# Patient Record
Sex: FEMALE | Race: BLACK OR AFRICAN AMERICAN | Employment: OTHER
[De-identification: names, ages, dates, MRNs, and addresses within clinical notes are randomized per-mention and may not be internally consistent; named-entity substitution may affect disease eponyms.]

---

## 2017-01-19 ENCOUNTER — TELEPHONE (OUTPATIENT)
Dept: INTERNAL MEDICINE | Facility: CLINIC | Age: 67
End: 2017-01-19

## 2017-01-20 DIAGNOSIS — E11.69 DIABETES MELLITUS TYPE 2 IN OBESE (HCC): ICD-10-CM

## 2017-01-20 DIAGNOSIS — E66.9 DIABETES MELLITUS TYPE 2 IN OBESE (HCC): ICD-10-CM

## 2017-01-23 RX ORDER — AMITRIPTYLINE HYDROCHLORIDE 25 MG/1
25 TABLET, FILM COATED ORAL NIGHTLY
Qty: 30 TABLET | Refills: 3 | Status: SHIPPED | OUTPATIENT
Start: 2017-01-23 | End: 2017-03-21 | Stop reason: SDUPTHER

## 2017-01-23 RX ORDER — OXYBUTYNIN CHLORIDE 5 MG/1
TABLET ORAL
Qty: 30 TABLET | Refills: 5 | Status: SHIPPED | OUTPATIENT
Start: 2017-01-23 | End: 2017-07-10 | Stop reason: SDUPTHER

## 2017-01-25 ENCOUNTER — CARE COORDINATION (OUTPATIENT)
Dept: CARE COORDINATION | Facility: CLINIC | Age: 67
End: 2017-01-25

## 2017-02-08 ENCOUNTER — CARE COORDINATION (OUTPATIENT)
Dept: CARE COORDINATION | Facility: CLINIC | Age: 67
End: 2017-02-08

## 2017-02-10 ENCOUNTER — TELEPHONE (OUTPATIENT)
Dept: FAMILY MEDICINE CLINIC | Facility: CLINIC | Age: 67
End: 2017-02-10

## 2017-02-13 ENCOUNTER — CARE COORDINATION (OUTPATIENT)
Dept: CARE COORDINATION | Facility: CLINIC | Age: 67
End: 2017-02-13

## 2017-02-14 ENCOUNTER — TELEPHONE (OUTPATIENT)
Dept: FAMILY MEDICINE CLINIC | Facility: CLINIC | Age: 67
End: 2017-02-14

## 2017-02-15 ENCOUNTER — TELEPHONE (OUTPATIENT)
Dept: FAMILY MEDICINE CLINIC | Facility: CLINIC | Age: 67
End: 2017-02-15

## 2017-02-16 ENCOUNTER — TELEPHONE (OUTPATIENT)
Dept: FAMILY MEDICINE CLINIC | Facility: CLINIC | Age: 67
End: 2017-02-16

## 2017-02-23 ENCOUNTER — TELEPHONE (OUTPATIENT)
Dept: FAMILY MEDICINE CLINIC | Facility: CLINIC | Age: 67
End: 2017-02-23

## 2017-02-23 ENCOUNTER — CARE COORDINATION (OUTPATIENT)
Dept: CARE COORDINATION | Facility: CLINIC | Age: 67
End: 2017-02-23

## 2017-03-02 ENCOUNTER — APPOINTMENT (OUTPATIENT)
Dept: CT IMAGING | Age: 67
DRG: 287 | End: 2017-03-02
Payer: MEDICARE

## 2017-03-02 ENCOUNTER — APPOINTMENT (OUTPATIENT)
Dept: GENERAL RADIOLOGY | Age: 67
DRG: 287 | End: 2017-03-02
Payer: MEDICARE

## 2017-03-02 ENCOUNTER — HOSPITAL ENCOUNTER (INPATIENT)
Age: 67
LOS: 1 days | Discharge: HOME | DRG: 287 | End: 2017-03-07
Attending: EMERGENCY MEDICINE | Admitting: INTERNAL MEDICINE
Payer: MEDICARE

## 2017-03-02 DIAGNOSIS — D32.9 MENINGIOMA (HCC): ICD-10-CM

## 2017-03-02 DIAGNOSIS — R06.02 SHORTNESS OF BREATH: ICD-10-CM

## 2017-03-02 DIAGNOSIS — R07.9 CHEST PAIN, UNSPECIFIED TYPE: Primary | ICD-10-CM

## 2017-03-02 DIAGNOSIS — I44.0 FIRST DEGREE AV BLOCK: ICD-10-CM

## 2017-03-02 DIAGNOSIS — N17.9 AKI (ACUTE KIDNEY INJURY) (HCC): ICD-10-CM

## 2017-03-02 DIAGNOSIS — M54.5 LOW BACK PAIN, UNSPECIFIED BACK PAIN LATERALITY, UNSPECIFIED CHRONICITY, WITH SCIATICA PRESENCE UNSPECIFIED: ICD-10-CM

## 2017-03-02 DIAGNOSIS — R73.9 HYPERGLYCEMIA: ICD-10-CM

## 2017-03-02 LAB
ABSOLUTE EOS #: 0.1 K/UL (ref 0–0.4)
ABSOLUTE LYMPH #: 2.6 K/UL (ref 1–4.8)
ABSOLUTE MONO #: 0.7 K/UL (ref 0.1–1.2)
ANION GAP SERPL CALCULATED.3IONS-SCNC: 18 MMOL/L (ref 9–17)
BASOPHILS # BLD: 0 % (ref 0–2)
BASOPHILS ABSOLUTE: 0 K/UL (ref 0–0.2)
BNP INTERPRETATION: NORMAL
BUN BLDV-MCNC: 34 MG/DL (ref 8–23)
BUN/CREAT BLD: ABNORMAL (ref 9–20)
CALCIUM SERPL-MCNC: 9.4 MG/DL (ref 8.6–10.4)
CHLORIDE BLD-SCNC: 97 MMOL/L (ref 98–107)
CO2: 25 MMOL/L (ref 20–31)
CREAT SERPL-MCNC: 1.31 MG/DL (ref 0.5–0.9)
DIFFERENTIAL TYPE: ABNORMAL
EOSINOPHILS RELATIVE PERCENT: 1 % (ref 1–4)
GFR AFRICAN AMERICAN: 49 ML/MIN
GFR NON-AFRICAN AMERICAN: 41 ML/MIN
GFR SERPL CREATININE-BSD FRML MDRD: ABNORMAL ML/MIN/{1.73_M2}
GFR SERPL CREATININE-BSD FRML MDRD: ABNORMAL ML/MIN/{1.73_M2}
GLUCOSE BLD-MCNC: 112 MG/DL (ref 70–99)
GLUCOSE BLD-MCNC: 188 MG/DL (ref 65–105)
HCT VFR BLD CALC: 41.4 % (ref 36–46)
HEMOGLOBIN: 13.9 G/DL (ref 12–16)
LYMPHOCYTES # BLD: 27 % (ref 24–44)
MCH RBC QN AUTO: 29.5 PG (ref 26–34)
MCHC RBC AUTO-ENTMCNC: 33.5 G/DL (ref 31–37)
MCV RBC AUTO: 88.1 FL (ref 80–100)
MONOCYTES # BLD: 8 % (ref 2–11)
PDW BLD-RTO: 16 % (ref 12.5–15.4)
PLATELET # BLD: 195 K/UL (ref 140–450)
PLATELET ESTIMATE: ABNORMAL
PMV BLD AUTO: 8.8 FL (ref 6–12)
POC TROPONIN I: 0 NG/ML (ref 0–0.1)
POC TROPONIN I: 0 NG/ML (ref 0–0.1)
POC TROPONIN INTERP: NORMAL
POC TROPONIN INTERP: NORMAL
POTASSIUM SERPL-SCNC: 5.4 MMOL/L (ref 3.7–5.3)
PRO-BNP: 159 PG/ML
RBC # BLD: 4.7 M/UL (ref 4–5.2)
RBC # BLD: ABNORMAL 10*6/UL
SEG NEUTROPHILS: 64 % (ref 36–66)
SEGMENTED NEUTROPHILS ABSOLUTE COUNT: 6 K/UL (ref 1.8–7.7)
SODIUM BLD-SCNC: 140 MMOL/L (ref 135–144)
TROPONIN INTERP: NORMAL
TROPONIN INTERP: NORMAL
TROPONIN T: <0.03 NG/ML
TROPONIN T: <0.03 NG/ML
TSH SERPL DL<=0.05 MIU/L-ACNC: 0.89 MIU/L (ref 0.3–5)
WBC # BLD: 9.5 K/UL (ref 3.5–11)
WBC # BLD: ABNORMAL 10*3/UL

## 2017-03-02 PROCEDURE — 82947 ASSAY GLUCOSE BLOOD QUANT: CPT

## 2017-03-02 PROCEDURE — 84443 ASSAY THYROID STIM HORMONE: CPT

## 2017-03-02 PROCEDURE — 6370000000 HC RX 637 (ALT 250 FOR IP): Performed by: STUDENT IN AN ORGANIZED HEALTH CARE EDUCATION/TRAINING PROGRAM

## 2017-03-02 PROCEDURE — 6360000002 HC RX W HCPCS

## 2017-03-02 PROCEDURE — 1200000000 HC SEMI PRIVATE

## 2017-03-02 PROCEDURE — 36415 COLL VENOUS BLD VENIPUNCTURE: CPT

## 2017-03-02 PROCEDURE — 84484 ASSAY OF TROPONIN QUANT: CPT

## 2017-03-02 PROCEDURE — G0378 HOSPITAL OBSERVATION PER HR: HCPCS

## 2017-03-02 PROCEDURE — 83880 ASSAY OF NATRIURETIC PEPTIDE: CPT

## 2017-03-02 PROCEDURE — 84132 ASSAY OF SERUM POTASSIUM: CPT

## 2017-03-02 PROCEDURE — 93005 ELECTROCARDIOGRAM TRACING: CPT

## 2017-03-02 PROCEDURE — 2580000003 HC RX 258: Performed by: INTERNAL MEDICINE

## 2017-03-02 PROCEDURE — 71020 XR CHEST STANDARD TWO VW: CPT

## 2017-03-02 PROCEDURE — 6370000000 HC RX 637 (ALT 250 FOR IP): Performed by: EMERGENCY MEDICINE

## 2017-03-02 PROCEDURE — 2580000003 HC RX 258: Performed by: EMERGENCY MEDICINE

## 2017-03-02 PROCEDURE — 83036 HEMOGLOBIN GLYCOSYLATED A1C: CPT

## 2017-03-02 PROCEDURE — 80048 BASIC METABOLIC PNL TOTAL CA: CPT

## 2017-03-02 PROCEDURE — 99285 EMERGENCY DEPT VISIT HI MDM: CPT

## 2017-03-02 PROCEDURE — 82010 KETONE BODYS QUAN: CPT

## 2017-03-02 PROCEDURE — 85025 COMPLETE CBC W/AUTO DIFF WBC: CPT

## 2017-03-02 RX ORDER — NITROGLYCERIN 0.4 MG/1
0.4 TABLET SUBLINGUAL EVERY 5 MIN PRN
Status: DISCONTINUED | OUTPATIENT
Start: 2017-03-02 | End: 2017-03-07 | Stop reason: HOSPADM

## 2017-03-02 RX ORDER — DEXTROSE MONOHYDRATE 25 G/50ML
25 INJECTION, SOLUTION INTRAVENOUS ONCE
Status: COMPLETED | OUTPATIENT
Start: 2017-03-02 | End: 2017-03-03

## 2017-03-02 RX ORDER — ONDANSETRON 2 MG/ML
4 INJECTION INTRAMUSCULAR; INTRAVENOUS EVERY 6 HOURS PRN
Status: DISCONTINUED | OUTPATIENT
Start: 2017-03-02 | End: 2017-03-07 | Stop reason: HOSPADM

## 2017-03-02 RX ORDER — 0.9 % SODIUM CHLORIDE 0.9 %
250 INTRAVENOUS SOLUTION INTRAVENOUS ONCE
Status: COMPLETED | OUTPATIENT
Start: 2017-03-02 | End: 2017-03-02

## 2017-03-02 RX ORDER — ASPIRIN 81 MG/1
324 TABLET, CHEWABLE ORAL ONCE
Status: COMPLETED | OUTPATIENT
Start: 2017-03-02 | End: 2017-03-02

## 2017-03-02 RX ORDER — CARVEDILOL 6.25 MG/1
6.25 TABLET ORAL 2 TIMES DAILY WITH MEALS
Status: DISCONTINUED | OUTPATIENT
Start: 2017-03-03 | End: 2017-03-07 | Stop reason: HOSPADM

## 2017-03-02 RX ORDER — SODIUM CHLORIDE 9 MG/ML
INJECTION, SOLUTION INTRAVENOUS CONTINUOUS
Status: DISCONTINUED | OUTPATIENT
Start: 2017-03-02 | End: 2017-03-03

## 2017-03-02 RX ORDER — ATORVASTATIN CALCIUM 80 MG/1
80 TABLET, FILM COATED ORAL NIGHTLY
Status: DISCONTINUED | OUTPATIENT
Start: 2017-03-02 | End: 2017-03-07 | Stop reason: HOSPADM

## 2017-03-02 RX ORDER — SODIUM CHLORIDE 0.9 % (FLUSH) 0.9 %
10 SYRINGE (ML) INJECTION EVERY 12 HOURS SCHEDULED
Status: DISCONTINUED | OUTPATIENT
Start: 2017-03-02 | End: 2017-03-07 | Stop reason: HOSPADM

## 2017-03-02 RX ORDER — AMITRIPTYLINE HYDROCHLORIDE 25 MG/1
25 TABLET, FILM COATED ORAL NIGHTLY
Status: DISCONTINUED | OUTPATIENT
Start: 2017-03-02 | End: 2017-03-07 | Stop reason: HOSPADM

## 2017-03-02 RX ORDER — ASPIRIN 81 MG/1
81 TABLET ORAL DAILY
Status: DISCONTINUED | OUTPATIENT
Start: 2017-03-02 | End: 2017-03-07 | Stop reason: HOSPADM

## 2017-03-02 RX ORDER — BUTALBITAL, ACETAMINOPHEN AND CAFFEINE 50; 325; 40 MG/1; MG/1; MG/1
1 TABLET ORAL EVERY 4 HOURS PRN
Status: DISCONTINUED | OUTPATIENT
Start: 2017-03-02 | End: 2017-03-07 | Stop reason: HOSPADM

## 2017-03-02 RX ORDER — PANTOPRAZOLE SODIUM 40 MG/1
40 TABLET, DELAYED RELEASE ORAL
Status: DISCONTINUED | OUTPATIENT
Start: 2017-03-03 | End: 2017-03-03

## 2017-03-02 RX ORDER — DEXTROSE MONOHYDRATE 50 MG/ML
100 INJECTION, SOLUTION INTRAVENOUS PRN
Status: DISCONTINUED | OUTPATIENT
Start: 2017-03-02 | End: 2017-03-07 | Stop reason: HOSPADM

## 2017-03-02 RX ORDER — NICOTINE POLACRILEX 4 MG
15 LOZENGE BUCCAL PRN
Status: DISCONTINUED | OUTPATIENT
Start: 2017-03-02 | End: 2017-03-07 | Stop reason: HOSPADM

## 2017-03-02 RX ORDER — ISOSORBIDE MONONITRATE 30 MG/1
30 TABLET, EXTENDED RELEASE ORAL DAILY
COMMUNITY
End: 2017-03-16 | Stop reason: SDUPTHER

## 2017-03-02 RX ORDER — SODIUM CHLORIDE 0.9 % (FLUSH) 0.9 %
10 SYRINGE (ML) INJECTION PRN
Status: DISCONTINUED | OUTPATIENT
Start: 2017-03-02 | End: 2017-03-04

## 2017-03-02 RX ORDER — CARVEDILOL 12.5 MG/1
12.5 TABLET ORAL 2 TIMES DAILY WITH MEALS
Status: DISCONTINUED | OUTPATIENT
Start: 2017-03-02 | End: 2017-03-02

## 2017-03-02 RX ORDER — VERAPAMIL HYDROCHLORIDE 120 MG/1
120 CAPSULE, EXTENDED RELEASE ORAL NIGHTLY
Status: DISCONTINUED | OUTPATIENT
Start: 2017-03-02 | End: 2017-03-03

## 2017-03-02 RX ORDER — METOPROLOL SUCCINATE 25 MG/1
25 TABLET, EXTENDED RELEASE ORAL DAILY
Status: DISCONTINUED | OUTPATIENT
Start: 2017-03-02 | End: 2017-03-02

## 2017-03-02 RX ORDER — ACETAMINOPHEN 325 MG/1
650 TABLET ORAL EVERY 4 HOURS PRN
Status: DISCONTINUED | OUTPATIENT
Start: 2017-03-02 | End: 2017-03-07 | Stop reason: HOSPADM

## 2017-03-02 RX ORDER — METOPROLOL SUCCINATE 25 MG/1
25 TABLET, EXTENDED RELEASE ORAL DAILY
Status: ON HOLD | COMMUNITY
End: 2017-03-07 | Stop reason: HOSPADM

## 2017-03-02 RX ORDER — CLOPIDOGREL BISULFATE 75 MG/1
75 TABLET ORAL DAILY
Status: DISCONTINUED | OUTPATIENT
Start: 2017-03-02 | End: 2017-03-07 | Stop reason: HOSPADM

## 2017-03-02 RX ORDER — HEPARIN SODIUM 5000 [USP'U]/ML
5000 INJECTION, SOLUTION INTRAVENOUS; SUBCUTANEOUS EVERY 8 HOURS SCHEDULED
Status: DISCONTINUED | OUTPATIENT
Start: 2017-03-02 | End: 2017-03-03

## 2017-03-02 RX ORDER — ACETAMINOPHEN 325 MG/1
650 TABLET ORAL EVERY 4 HOURS PRN
Status: DISCONTINUED | OUTPATIENT
Start: 2017-03-02 | End: 2017-03-03

## 2017-03-02 RX ORDER — MORPHINE SULFATE 2 MG/ML
2 INJECTION, SOLUTION INTRAMUSCULAR; INTRAVENOUS EVERY 4 HOURS PRN
Status: DISCONTINUED | OUTPATIENT
Start: 2017-03-02 | End: 2017-03-07 | Stop reason: HOSPADM

## 2017-03-02 RX ORDER — ASPIRIN 81 MG/1
81 TABLET, CHEWABLE ORAL DAILY
Status: DISCONTINUED | OUTPATIENT
Start: 2017-03-03 | End: 2017-03-02

## 2017-03-02 RX ORDER — PREDNISONE 10 MG/1
20 TABLET ORAL DAILY
Status: ON HOLD | COMMUNITY
End: 2017-03-07 | Stop reason: HOSPADM

## 2017-03-02 RX ORDER — DEXTROSE MONOHYDRATE 25 G/50ML
12.5 INJECTION, SOLUTION INTRAVENOUS PRN
Status: DISCONTINUED | OUTPATIENT
Start: 2017-03-02 | End: 2017-03-07 | Stop reason: HOSPADM

## 2017-03-02 RX ADMIN — AMITRIPTYLINE HYDROCHLORIDE 25 MG: 25 TABLET, FILM COATED ORAL at 22:19

## 2017-03-02 RX ADMIN — ATORVASTATIN CALCIUM 80 MG: 80 TABLET, FILM COATED ORAL at 22:19

## 2017-03-02 RX ADMIN — CLOPIDOGREL 75 MG: 75 TABLET, FILM COATED ORAL at 22:19

## 2017-03-02 RX ADMIN — Medication 10 ML: at 22:23

## 2017-03-02 RX ADMIN — ASPIRIN 81 MG: 81 TABLET, COATED ORAL at 22:19

## 2017-03-02 RX ADMIN — VERAPAMIL HYDROCHLORIDE 120 MG: 120 CAPSULE, EXTENDED RELEASE ORAL at 22:19

## 2017-03-02 RX ADMIN — ASPIRIN 324 MG: 81 TABLET ORAL at 14:24

## 2017-03-02 RX ADMIN — SODIUM CHLORIDE 250 ML: 9 INJECTION, SOLUTION INTRAVENOUS at 16:15

## 2017-03-02 ASSESSMENT — PAIN DESCRIPTION - LOCATION
LOCATION: CHEST
LOCATION: CHEST

## 2017-03-02 ASSESSMENT — ENCOUNTER SYMPTOMS
APNEA: 0
SHORTNESS OF BREATH: 1
WHEEZING: 0
COUGH: 0
TROUBLE SWALLOWING: 0
DIARRHEA: 0
ABDOMINAL DISTENTION: 0
NAUSEA: 0
ABDOMINAL PAIN: 0
EYE PAIN: 0
BACK PAIN: 0
VOMITING: 0
CHOKING: 0
RHINORRHEA: 0
COLOR CHANGE: 0
PHOTOPHOBIA: 0
BLOOD IN STOOL: 0
VOICE CHANGE: 0
CHEST TIGHTNESS: 0
CONSTIPATION: 0
STRIDOR: 0
EYE REDNESS: 0
SORE THROAT: 0
EYE DISCHARGE: 0

## 2017-03-02 ASSESSMENT — PAIN SCALES - GENERAL
PAINLEVEL_OUTOF10: 5
PAINLEVEL_OUTOF10: 5

## 2017-03-02 ASSESSMENT — PAIN DESCRIPTION - PAIN TYPE
TYPE: ACUTE PAIN
TYPE: ACUTE PAIN

## 2017-03-03 ENCOUNTER — APPOINTMENT (OUTPATIENT)
Dept: NUCLEAR MEDICINE | Age: 67
DRG: 287 | End: 2017-03-03
Payer: MEDICARE

## 2017-03-03 PROBLEM — N17.9 AKI (ACUTE KIDNEY INJURY) (HCC): Status: ACTIVE | Noted: 2017-03-03

## 2017-03-03 PROBLEM — I50.32 CHRONIC DIASTOLIC CONGESTIVE HEART FAILURE (HCC): Status: ACTIVE | Noted: 2017-03-03

## 2017-03-03 LAB
ANION GAP SERPL CALCULATED.3IONS-SCNC: 11 MMOL/L (ref 9–17)
BETA-HYDROXYBUTYRATE: 0.16 MMOL/L (ref 0.02–0.27)
BUN BLDV-MCNC: 27 MG/DL (ref 8–23)
BUN/CREAT BLD: ABNORMAL (ref 9–20)
CALCIUM SERPL-MCNC: 8.5 MG/DL (ref 8.6–10.4)
CHLORIDE BLD-SCNC: 101 MMOL/L (ref 98–107)
CHOLESTEROL/HDL RATIO: 2.8
CHOLESTEROL: 122 MG/DL
CO2: 27 MMOL/L (ref 20–31)
CREAT SERPL-MCNC: 0.83 MG/DL (ref 0.5–0.9)
EKG ATRIAL RATE: 64 BPM
EKG ATRIAL RATE: 67 BPM
EKG P AXIS: 46 DEGREES
EKG P AXIS: 55 DEGREES
EKG P-R INTERVAL: 202 MS
EKG P-R INTERVAL: 280 MS
EKG Q-T INTERVAL: 402 MS
EKG Q-T INTERVAL: 446 MS
EKG QRS DURATION: 102 MS
EKG QRS DURATION: 96 MS
EKG QTC CALCULATION (BAZETT): 414 MS
EKG QTC CALCULATION (BAZETT): 471 MS
EKG R AXIS: -13 DEGREES
EKG R AXIS: 1 DEGREES
EKG T AXIS: 57 DEGREES
EKG T AXIS: 60 DEGREES
EKG VENTRICULAR RATE: 64 BPM
EKG VENTRICULAR RATE: 67 BPM
ESTIMATED AVERAGE GLUCOSE: 146 MG/DL
GFR AFRICAN AMERICAN: >60 ML/MIN
GFR NON-AFRICAN AMERICAN: >60 ML/MIN
GFR SERPL CREATININE-BSD FRML MDRD: ABNORMAL ML/MIN/{1.73_M2}
GFR SERPL CREATININE-BSD FRML MDRD: ABNORMAL ML/MIN/{1.73_M2}
GLUCOSE BLD-MCNC: 104 MG/DL (ref 65–105)
GLUCOSE BLD-MCNC: 116 MG/DL (ref 70–99)
GLUCOSE BLD-MCNC: 137 MG/DL (ref 65–105)
GLUCOSE BLD-MCNC: 138 MG/DL (ref 65–105)
GLUCOSE BLD-MCNC: 171 MG/DL (ref 65–105)
HBA1C MFR BLD: 6.7 % (ref 4–6)
HCT VFR BLD CALC: 37 % (ref 36–46)
HDLC SERPL-MCNC: 44 MG/DL
HEMOGLOBIN: 11.9 G/DL (ref 12–16)
LDL CHOLESTEROL: 55 MG/DL (ref 0–130)
LV EF: 55 %
LVEF MODALITY: NORMAL
MCH RBC QN AUTO: 29.2 PG (ref 26–34)
MCHC RBC AUTO-ENTMCNC: 32.2 G/DL (ref 31–37)
MCV RBC AUTO: 90.5 FL (ref 80–100)
PDW BLD-RTO: 16.8 % (ref 12.5–15.4)
PLATELET # BLD: 160 K/UL (ref 140–450)
PMV BLD AUTO: 9 FL (ref 6–12)
POTASSIUM SERPL-SCNC: 4.1 MMOL/L (ref 3.7–5.3)
POTASSIUM SERPL-SCNC: 4.7 MMOL/L (ref 3.7–5.3)
RBC # BLD: 4.08 M/UL (ref 4–5.2)
SODIUM BLD-SCNC: 139 MMOL/L (ref 135–144)
TRIGL SERPL-MCNC: 116 MG/DL
TROPONIN INTERP: NORMAL
TROPONIN INTERP: NORMAL
TROPONIN T: <0.03 NG/ML
TROPONIN T: <0.03 NG/ML
VLDLC SERPL CALC-MCNC: NORMAL MG/DL (ref 1–30)
WBC # BLD: 7.8 K/UL (ref 3.5–11)

## 2017-03-03 PROCEDURE — 82947 ASSAY GLUCOSE BLOOD QUANT: CPT

## 2017-03-03 PROCEDURE — 6360000002 HC RX W HCPCS: Performed by: STUDENT IN AN ORGANIZED HEALTH CARE EDUCATION/TRAINING PROGRAM

## 2017-03-03 PROCEDURE — 84484 ASSAY OF TROPONIN QUANT: CPT

## 2017-03-03 PROCEDURE — 80048 BASIC METABOLIC PNL TOTAL CA: CPT

## 2017-03-03 PROCEDURE — 93970 EXTREMITY STUDY: CPT

## 2017-03-03 PROCEDURE — 2580000003 HC RX 258: Performed by: STUDENT IN AN ORGANIZED HEALTH CARE EDUCATION/TRAINING PROGRAM

## 2017-03-03 PROCEDURE — 96372 THER/PROPH/DIAG INJ SC/IM: CPT

## 2017-03-03 PROCEDURE — 94762 N-INVAS EAR/PLS OXIMTRY CONT: CPT

## 2017-03-03 PROCEDURE — 85027 COMPLETE CBC AUTOMATED: CPT

## 2017-03-03 PROCEDURE — 36415 COLL VENOUS BLD VENIPUNCTURE: CPT

## 2017-03-03 PROCEDURE — G0378 HOSPITAL OBSERVATION PER HR: HCPCS

## 2017-03-03 PROCEDURE — 6360000002 HC RX W HCPCS: Performed by: INTERNAL MEDICINE

## 2017-03-03 PROCEDURE — 80061 LIPID PANEL: CPT

## 2017-03-03 PROCEDURE — 93306 TTE W/DOPPLER COMPLETE: CPT

## 2017-03-03 PROCEDURE — A9500 TC99M SESTAMIBI: HCPCS | Performed by: INTERNAL MEDICINE

## 2017-03-03 PROCEDURE — 78452 HT MUSCLE IMAGE SPECT MULT: CPT

## 2017-03-03 PROCEDURE — 99223 1ST HOSP IP/OBS HIGH 75: CPT | Performed by: INTERNAL MEDICINE

## 2017-03-03 PROCEDURE — 6370000000 HC RX 637 (ALT 250 FOR IP): Performed by: STUDENT IN AN ORGANIZED HEALTH CARE EDUCATION/TRAINING PROGRAM

## 2017-03-03 PROCEDURE — 93017 CV STRESS TEST TRACING ONLY: CPT | Performed by: NURSE PRACTITIONER

## 2017-03-03 PROCEDURE — 3430000000 HC RX DIAGNOSTIC RADIOPHARMACEUTICAL: Performed by: INTERNAL MEDICINE

## 2017-03-03 PROCEDURE — 96374 THER/PROPH/DIAG INJ IV PUSH: CPT

## 2017-03-03 PROCEDURE — 93005 ELECTROCARDIOGRAM TRACING: CPT

## 2017-03-03 RX ORDER — SODIUM CHLORIDE 9 MG/ML
INJECTION, SOLUTION INTRAVENOUS ONCE
Status: COMPLETED | OUTPATIENT
Start: 2017-03-03 | End: 2017-03-03

## 2017-03-03 RX ORDER — AMLODIPINE BESYLATE 2.5 MG/1
2.5 TABLET ORAL DAILY
Status: DISCONTINUED | OUTPATIENT
Start: 2017-03-03 | End: 2017-03-05

## 2017-03-03 RX ORDER — 0.9 % SODIUM CHLORIDE 0.9 %
500 INTRAVENOUS SOLUTION INTRAVENOUS ONCE
Status: COMPLETED | OUTPATIENT
Start: 2017-03-03 | End: 2017-03-03

## 2017-03-03 RX ORDER — SODIUM CHLORIDE 0.9 % (FLUSH) 0.9 %
10 SYRINGE (ML) INJECTION PRN
Status: DISCONTINUED | OUTPATIENT
Start: 2017-03-03 | End: 2017-03-03

## 2017-03-03 RX ORDER — AMINOPHYLLINE DIHYDRATE 25 MG/ML
100 INJECTION, SOLUTION INTRAVENOUS
Status: COMPLETED | OUTPATIENT
Start: 2017-03-03 | End: 2017-03-03

## 2017-03-03 RX ADMIN — ATORVASTATIN CALCIUM 80 MG: 80 TABLET, FILM COATED ORAL at 20:20

## 2017-03-03 RX ADMIN — REGADENOSON 0.4 MG: 0.08 INJECTION, SOLUTION INTRAVENOUS at 11:00

## 2017-03-03 RX ADMIN — Medication 10 ML: at 10:51

## 2017-03-03 RX ADMIN — Medication 10 ML: at 11:00

## 2017-03-03 RX ADMIN — AMITRIPTYLINE HYDROCHLORIDE 25 MG: 25 TABLET, FILM COATED ORAL at 20:20

## 2017-03-03 RX ADMIN — AMINOPHYLLINE 100 MG: 25 INJECTION, SOLUTION INTRAVENOUS at 11:02

## 2017-03-03 RX ADMIN — SODIUM CHLORIDE: 9 INJECTION, SOLUTION INTRAVENOUS at 10:51

## 2017-03-03 RX ADMIN — Medication 10 ML: at 20:20

## 2017-03-03 RX ADMIN — CARVEDILOL 6.25 MG: 6.25 TABLET, FILM COATED ORAL at 17:37

## 2017-03-03 RX ADMIN — HEPARIN SODIUM 5000 UNITS: 5000 INJECTION, SOLUTION INTRAVENOUS; SUBCUTANEOUS at 00:03

## 2017-03-03 RX ADMIN — TETRAKIS(2-METHOXYISOBUTYLISOCYANIDE)COPPER(I) TETRAFLUOROBORATE 39.6 MILLICURIE: 1 INJECTION, POWDER, LYOPHILIZED, FOR SOLUTION INTRAVENOUS at 11:00

## 2017-03-03 RX ADMIN — SODIUM CHLORIDE 500 ML: 9 INJECTION, SOLUTION INTRAVENOUS at 02:42

## 2017-03-03 RX ADMIN — SODIUM CHLORIDE: 9 INJECTION, SOLUTION INTRAVENOUS at 00:02

## 2017-03-03 RX ADMIN — INSULIN LISPRO 1 UNITS: 100 INJECTION, SOLUTION INTRAVENOUS; SUBCUTANEOUS at 00:03

## 2017-03-03 RX ADMIN — DEXTROSE MONOHYDRATE 25 G: 25 INJECTION, SOLUTION INTRAVENOUS at 00:03

## 2017-03-03 RX ADMIN — INSULIN HUMAN 10 UNITS: 100 INJECTION, SOLUTION PARENTERAL at 00:03

## 2017-03-03 RX ADMIN — Medication 10 ML: at 00:02

## 2017-03-03 ASSESSMENT — PAIN SCALES - GENERAL: PAINLEVEL_OUTOF10: 0

## 2017-03-04 LAB
ANION GAP SERPL CALCULATED.3IONS-SCNC: 16 MMOL/L (ref 9–17)
BUN BLDV-MCNC: 23 MG/DL (ref 8–23)
BUN/CREAT BLD: ABNORMAL (ref 9–20)
CALCIUM SERPL-MCNC: 8.8 MG/DL (ref 8.6–10.4)
CHLORIDE BLD-SCNC: 103 MMOL/L (ref 98–107)
CO2: 23 MMOL/L (ref 20–31)
CREAT SERPL-MCNC: 0.97 MG/DL (ref 0.5–0.9)
D-DIMER QUANTITATIVE: 0.43 MG/L FEU
GFR AFRICAN AMERICAN: >60 ML/MIN
GFR NON-AFRICAN AMERICAN: 57 ML/MIN
GFR SERPL CREATININE-BSD FRML MDRD: ABNORMAL ML/MIN/{1.73_M2}
GFR SERPL CREATININE-BSD FRML MDRD: ABNORMAL ML/MIN/{1.73_M2}
GLUCOSE BLD-MCNC: 122 MG/DL (ref 65–105)
GLUCOSE BLD-MCNC: 135 MG/DL (ref 65–105)
GLUCOSE BLD-MCNC: 147 MG/DL (ref 65–105)
GLUCOSE BLD-MCNC: 166 MG/DL (ref 70–99)
GLUCOSE BLD-MCNC: 173 MG/DL (ref 65–105)
POTASSIUM SERPL-SCNC: 4.4 MMOL/L (ref 3.7–5.3)
SODIUM BLD-SCNC: 142 MMOL/L (ref 135–144)

## 2017-03-04 PROCEDURE — 3430000000 HC RX DIAGNOSTIC RADIOPHARMACEUTICAL: Performed by: INTERNAL MEDICINE

## 2017-03-04 PROCEDURE — 6370000000 HC RX 637 (ALT 250 FOR IP): Performed by: HOSPITALIST

## 2017-03-04 PROCEDURE — 99232 SBSQ HOSP IP/OBS MODERATE 35: CPT | Performed by: INTERNAL MEDICINE

## 2017-03-04 PROCEDURE — 6370000000 HC RX 637 (ALT 250 FOR IP): Performed by: STUDENT IN AN ORGANIZED HEALTH CARE EDUCATION/TRAINING PROGRAM

## 2017-03-04 PROCEDURE — G0378 HOSPITAL OBSERVATION PER HR: HCPCS

## 2017-03-04 PROCEDURE — 36415 COLL VENOUS BLD VENIPUNCTURE: CPT

## 2017-03-04 PROCEDURE — 94762 N-INVAS EAR/PLS OXIMTRY CONT: CPT

## 2017-03-04 PROCEDURE — 96372 THER/PROPH/DIAG INJ SC/IM: CPT

## 2017-03-04 PROCEDURE — 85379 FIBRIN DEGRADATION QUANT: CPT

## 2017-03-04 PROCEDURE — 2580000003 HC RX 258: Performed by: STUDENT IN AN ORGANIZED HEALTH CARE EDUCATION/TRAINING PROGRAM

## 2017-03-04 PROCEDURE — 82947 ASSAY GLUCOSE BLOOD QUANT: CPT

## 2017-03-04 PROCEDURE — 80048 BASIC METABOLIC PNL TOTAL CA: CPT

## 2017-03-04 PROCEDURE — 6360000002 HC RX W HCPCS: Performed by: STUDENT IN AN ORGANIZED HEALTH CARE EDUCATION/TRAINING PROGRAM

## 2017-03-04 PROCEDURE — A9500 TC99M SESTAMIBI: HCPCS | Performed by: INTERNAL MEDICINE

## 2017-03-04 RX ORDER — SODIUM CHLORIDE 0.9 % (FLUSH) 0.9 %
10 SYRINGE (ML) INJECTION PRN
Status: DISCONTINUED | OUTPATIENT
Start: 2017-03-04 | End: 2017-03-04

## 2017-03-04 RX ORDER — ISOSORBIDE MONONITRATE 30 MG/1
30 TABLET, EXTENDED RELEASE ORAL DAILY
Status: DISCONTINUED | OUTPATIENT
Start: 2017-03-04 | End: 2017-03-07 | Stop reason: HOSPADM

## 2017-03-04 RX ORDER — SODIUM CHLORIDE 0.9 % (FLUSH) 0.9 %
10 SYRINGE (ML) INJECTION PRN
Status: DISCONTINUED | OUTPATIENT
Start: 2017-03-04 | End: 2017-03-07 | Stop reason: HOSPADM

## 2017-03-04 RX ORDER — LOSARTAN POTASSIUM 50 MG/1
50 TABLET ORAL DAILY
Status: DISCONTINUED | OUTPATIENT
Start: 2017-03-04 | End: 2017-03-06

## 2017-03-04 RX ORDER — FUROSEMIDE 40 MG/1
40 TABLET ORAL 2 TIMES DAILY
Status: DISCONTINUED | OUTPATIENT
Start: 2017-03-04 | End: 2017-03-06

## 2017-03-04 RX ADMIN — ENOXAPARIN SODIUM 30 MG: 40 INJECTION SUBCUTANEOUS at 09:51

## 2017-03-04 RX ADMIN — ENOXAPARIN SODIUM 30 MG: 40 INJECTION SUBCUTANEOUS at 20:45

## 2017-03-04 RX ADMIN — CLOPIDOGREL 75 MG: 75 TABLET, FILM COATED ORAL at 09:32

## 2017-03-04 RX ADMIN — ATORVASTATIN CALCIUM 80 MG: 80 TABLET, FILM COATED ORAL at 20:46

## 2017-03-04 RX ADMIN — ASPIRIN 81 MG: 81 TABLET, COATED ORAL at 09:33

## 2017-03-04 RX ADMIN — AMITRIPTYLINE HYDROCHLORIDE 25 MG: 25 TABLET, FILM COATED ORAL at 21:24

## 2017-03-04 RX ADMIN — CARVEDILOL 6.25 MG: 6.25 TABLET, FILM COATED ORAL at 09:32

## 2017-03-04 RX ADMIN — ISOSORBIDE MONONITRATE 30 MG: 30 TABLET ORAL at 09:32

## 2017-03-04 RX ADMIN — INSULIN LISPRO 2 UNITS: 100 INJECTION, SOLUTION INTRAVENOUS; SUBCUTANEOUS at 09:33

## 2017-03-04 RX ADMIN — MAGNESIUM HYDROXIDE 30 ML: 400 SUSPENSION ORAL at 04:04

## 2017-03-04 RX ADMIN — FUROSEMIDE 40 MG: 40 TABLET ORAL at 17:25

## 2017-03-04 RX ADMIN — Medication 10 ML: at 20:45

## 2017-03-04 RX ADMIN — SODIUM CHLORIDE, PRESERVATIVE FREE 10 ML: 5 INJECTION INTRAVENOUS at 07:30

## 2017-03-04 RX ADMIN — FUROSEMIDE 40 MG: 40 TABLET ORAL at 09:32

## 2017-03-04 RX ADMIN — TETRAKIS(2-METHOXYISOBUTYLISOCYANIDE)COPPER(I) TETRAFLUOROBORATE 41 MILLICURIE: 1 INJECTION, POWDER, LYOPHILIZED, FOR SOLUTION INTRAVENOUS at 07:30

## 2017-03-04 RX ADMIN — LOSARTAN POTASSIUM 50 MG: 50 TABLET, FILM COATED ORAL at 09:32

## 2017-03-04 RX ADMIN — BUTALBITAL, ACETAMINOPHEN, AND CAFFEINE 1 TABLET: 50; 325; 40 TABLET ORAL at 14:59

## 2017-03-04 RX ADMIN — CARVEDILOL 6.25 MG: 6.25 TABLET, FILM COATED ORAL at 17:25

## 2017-03-04 RX ADMIN — AMLODIPINE BESYLATE 2.5 MG: 2.5 TABLET ORAL at 09:33

## 2017-03-04 ASSESSMENT — PAIN SCALES - GENERAL
PAINLEVEL_OUTOF10: 7
PAINLEVEL_OUTOF10: 5

## 2017-03-05 ENCOUNTER — CARE COORDINATION (OUTPATIENT)
Dept: CASE MANAGEMENT | Age: 67
End: 2017-03-05

## 2017-03-05 LAB
ANION GAP SERPL CALCULATED.3IONS-SCNC: 11 MMOL/L (ref 9–17)
BUN BLDV-MCNC: 23 MG/DL (ref 8–23)
BUN/CREAT BLD: ABNORMAL (ref 9–20)
CALCIUM SERPL-MCNC: 8.5 MG/DL (ref 8.6–10.4)
CHLORIDE BLD-SCNC: 103 MMOL/L (ref 98–107)
CO2: 25 MMOL/L (ref 20–31)
CREAT SERPL-MCNC: 0.78 MG/DL (ref 0.5–0.9)
GFR AFRICAN AMERICAN: >60 ML/MIN
GFR NON-AFRICAN AMERICAN: >60 ML/MIN
GFR SERPL CREATININE-BSD FRML MDRD: ABNORMAL ML/MIN/{1.73_M2}
GFR SERPL CREATININE-BSD FRML MDRD: ABNORMAL ML/MIN/{1.73_M2}
GLUCOSE BLD-MCNC: 115 MG/DL (ref 65–105)
GLUCOSE BLD-MCNC: 116 MG/DL (ref 65–105)
GLUCOSE BLD-MCNC: 135 MG/DL (ref 65–105)
GLUCOSE BLD-MCNC: 145 MG/DL (ref 70–99)
GLUCOSE BLD-MCNC: 166 MG/DL (ref 65–105)
HCT VFR BLD CALC: 41.1 % (ref 36–46)
HEMOGLOBIN: 13.5 G/DL (ref 12–16)
MCH RBC QN AUTO: 29.4 PG (ref 26–34)
MCHC RBC AUTO-ENTMCNC: 32.7 G/DL (ref 31–37)
MCV RBC AUTO: 89.8 FL (ref 80–100)
PDW BLD-RTO: 16.5 % (ref 12.5–15.4)
PLATELET # BLD: 161 K/UL (ref 140–450)
PMV BLD AUTO: 8.3 FL (ref 6–12)
POTASSIUM SERPL-SCNC: 4.4 MMOL/L (ref 3.7–5.3)
RBC # BLD: 4.58 M/UL (ref 4–5.2)
SODIUM BLD-SCNC: 139 MMOL/L (ref 135–144)
WBC # BLD: 6.8 K/UL (ref 3.5–11)

## 2017-03-05 PROCEDURE — 36415 COLL VENOUS BLD VENIPUNCTURE: CPT

## 2017-03-05 PROCEDURE — 6370000000 HC RX 637 (ALT 250 FOR IP): Performed by: STUDENT IN AN ORGANIZED HEALTH CARE EDUCATION/TRAINING PROGRAM

## 2017-03-05 PROCEDURE — 6370000000 HC RX 637 (ALT 250 FOR IP): Performed by: HOSPITALIST

## 2017-03-05 PROCEDURE — 2580000003 HC RX 258: Performed by: STUDENT IN AN ORGANIZED HEALTH CARE EDUCATION/TRAINING PROGRAM

## 2017-03-05 PROCEDURE — 6360000002 HC RX W HCPCS: Performed by: STUDENT IN AN ORGANIZED HEALTH CARE EDUCATION/TRAINING PROGRAM

## 2017-03-05 PROCEDURE — 80048 BASIC METABOLIC PNL TOTAL CA: CPT

## 2017-03-05 PROCEDURE — 99232 SBSQ HOSP IP/OBS MODERATE 35: CPT | Performed by: INTERNAL MEDICINE

## 2017-03-05 PROCEDURE — 96372 THER/PROPH/DIAG INJ SC/IM: CPT

## 2017-03-05 PROCEDURE — 94762 N-INVAS EAR/PLS OXIMTRY CONT: CPT

## 2017-03-05 PROCEDURE — 82947 ASSAY GLUCOSE BLOOD QUANT: CPT

## 2017-03-05 PROCEDURE — 85027 COMPLETE CBC AUTOMATED: CPT

## 2017-03-05 PROCEDURE — G0378 HOSPITAL OBSERVATION PER HR: HCPCS

## 2017-03-05 RX ORDER — AMLODIPINE BESYLATE 2.5 MG/1
2.5 TABLET ORAL NIGHTLY
Status: DISCONTINUED | OUTPATIENT
Start: 2017-03-06 | End: 2017-03-06

## 2017-03-05 RX ORDER — OXYBUTYNIN CHLORIDE 5 MG/1
5 TABLET ORAL DAILY
Status: DISCONTINUED | OUTPATIENT
Start: 2017-03-05 | End: 2017-03-07 | Stop reason: HOSPADM

## 2017-03-05 RX ADMIN — CARVEDILOL 6.25 MG: 6.25 TABLET, FILM COATED ORAL at 09:37

## 2017-03-05 RX ADMIN — Medication 10 ML: at 21:13

## 2017-03-05 RX ADMIN — ENOXAPARIN SODIUM 30 MG: 40 INJECTION SUBCUTANEOUS at 09:36

## 2017-03-05 RX ADMIN — ISOSORBIDE MONONITRATE 30 MG: 30 TABLET ORAL at 09:36

## 2017-03-05 RX ADMIN — ENOXAPARIN SODIUM 30 MG: 40 INJECTION SUBCUTANEOUS at 21:13

## 2017-03-05 RX ADMIN — BUTALBITAL, ACETAMINOPHEN, AND CAFFEINE 1 TABLET: 50; 325; 40 TABLET ORAL at 09:43

## 2017-03-05 RX ADMIN — AMLODIPINE BESYLATE 2.5 MG: 2.5 TABLET ORAL at 09:37

## 2017-03-05 RX ADMIN — MAGNESIUM HYDROXIDE 30 ML: 400 SUSPENSION ORAL at 16:13

## 2017-03-05 RX ADMIN — LOSARTAN POTASSIUM 50 MG: 50 TABLET, FILM COATED ORAL at 09:35

## 2017-03-05 RX ADMIN — ATORVASTATIN CALCIUM 80 MG: 80 TABLET, FILM COATED ORAL at 21:13

## 2017-03-05 RX ADMIN — BUTALBITAL, ACETAMINOPHEN, AND CAFFEINE 1 TABLET: 50; 325; 40 TABLET ORAL at 00:51

## 2017-03-05 RX ADMIN — Medication 10 ML: at 09:37

## 2017-03-05 RX ADMIN — ASPIRIN 81 MG: 81 TABLET, COATED ORAL at 09:37

## 2017-03-05 RX ADMIN — CLOPIDOGREL 75 MG: 75 TABLET, FILM COATED ORAL at 09:37

## 2017-03-05 RX ADMIN — CARVEDILOL 6.25 MG: 6.25 TABLET, FILM COATED ORAL at 16:13

## 2017-03-05 RX ADMIN — INSULIN LISPRO 1 UNITS: 100 INJECTION, SOLUTION INTRAVENOUS; SUBCUTANEOUS at 21:14

## 2017-03-05 RX ADMIN — AMITRIPTYLINE HYDROCHLORIDE 25 MG: 25 TABLET, FILM COATED ORAL at 21:13

## 2017-03-05 RX ADMIN — OXYBUTYNIN CHLORIDE 5 MG: 5 TABLET ORAL at 09:35

## 2017-03-05 ASSESSMENT — PAIN SCALES - GENERAL
PAINLEVEL_OUTOF10: 4
PAINLEVEL_OUTOF10: 6
PAINLEVEL_OUTOF10: 0

## 2017-03-05 ASSESSMENT — PAIN DESCRIPTION - FREQUENCY: FREQUENCY: CONTINUOUS

## 2017-03-05 ASSESSMENT — PAIN DESCRIPTION - DESCRIPTORS: DESCRIPTORS: THROBBING

## 2017-03-05 ASSESSMENT — PAIN DESCRIPTION - LOCATION: LOCATION: HEAD

## 2017-03-05 ASSESSMENT — PAIN DESCRIPTION - ORIENTATION: ORIENTATION: ANTERIOR

## 2017-03-06 ENCOUNTER — APPOINTMENT (OUTPATIENT)
Dept: CARDIAC CATH/INVASIVE PROCEDURES | Age: 67
DRG: 287 | End: 2017-03-06
Payer: MEDICARE

## 2017-03-06 LAB
ANION GAP SERPL CALCULATED.3IONS-SCNC: 16 MMOL/L (ref 9–17)
BUN BLDV-MCNC: 17 MG/DL (ref 8–23)
BUN/CREAT BLD: ABNORMAL (ref 9–20)
CALCIUM SERPL-MCNC: 8.8 MG/DL (ref 8.6–10.4)
CHLORIDE BLD-SCNC: 102 MMOL/L (ref 98–107)
CO2: 21 MMOL/L (ref 20–31)
CREAT SERPL-MCNC: 0.72 MG/DL (ref 0.5–0.9)
GFR AFRICAN AMERICAN: >60 ML/MIN
GFR NON-AFRICAN AMERICAN: >60 ML/MIN
GFR SERPL CREATININE-BSD FRML MDRD: ABNORMAL ML/MIN/{1.73_M2}
GFR SERPL CREATININE-BSD FRML MDRD: ABNORMAL ML/MIN/{1.73_M2}
GLUCOSE BLD-MCNC: 110 MG/DL (ref 65–105)
GLUCOSE BLD-MCNC: 112 MG/DL (ref 65–105)
GLUCOSE BLD-MCNC: 141 MG/DL (ref 70–99)
GLUCOSE BLD-MCNC: 151 MG/DL (ref 65–105)
HCT VFR BLD CALC: 37.1 % (ref 36–46)
HEMOGLOBIN: 12.2 G/DL (ref 12–16)
MCH RBC QN AUTO: 29.3 PG (ref 26–34)
MCHC RBC AUTO-ENTMCNC: 33 G/DL (ref 31–37)
MCV RBC AUTO: 89 FL (ref 80–100)
PDW BLD-RTO: 15.8 % (ref 12.5–15.4)
PLATELET # BLD: 145 K/UL (ref 140–450)
PMV BLD AUTO: 8.6 FL (ref 6–12)
POTASSIUM SERPL-SCNC: 4.4 MMOL/L (ref 3.7–5.3)
RBC # BLD: 4.17 M/UL (ref 4–5.2)
SODIUM BLD-SCNC: 139 MMOL/L (ref 135–144)
WBC # BLD: 7.2 K/UL (ref 3.5–11)

## 2017-03-06 PROCEDURE — 6370000000 HC RX 637 (ALT 250 FOR IP): Performed by: STUDENT IN AN ORGANIZED HEALTH CARE EDUCATION/TRAINING PROGRAM

## 2017-03-06 PROCEDURE — C1894 INTRO/SHEATH, NON-LASER: HCPCS

## 2017-03-06 PROCEDURE — 6360000002 HC RX W HCPCS: Performed by: STUDENT IN AN ORGANIZED HEALTH CARE EDUCATION/TRAINING PROGRAM

## 2017-03-06 PROCEDURE — 36415 COLL VENOUS BLD VENIPUNCTURE: CPT

## 2017-03-06 PROCEDURE — C1769 GUIDE WIRE: HCPCS

## 2017-03-06 PROCEDURE — 2580000003 HC RX 258: Performed by: HOSPITALIST

## 2017-03-06 PROCEDURE — 1200000000 HC SEMI PRIVATE

## 2017-03-06 PROCEDURE — C1725 CATH, TRANSLUMIN NON-LASER: HCPCS

## 2017-03-06 PROCEDURE — 82947 ASSAY GLUCOSE BLOOD QUANT: CPT | Performed by: INTERNAL MEDICINE

## 2017-03-06 PROCEDURE — 93005 ELECTROCARDIOGRAM TRACING: CPT

## 2017-03-06 PROCEDURE — 6360000002 HC RX W HCPCS: Performed by: INTERNAL MEDICINE

## 2017-03-06 PROCEDURE — B2151ZZ FLUOROSCOPY OF LEFT HEART USING LOW OSMOLAR CONTRAST: ICD-10-PCS | Performed by: INTERNAL MEDICINE

## 2017-03-06 PROCEDURE — B2111ZZ FLUOROSCOPY OF MULTIPLE CORONARY ARTERIES USING LOW OSMOLAR CONTRAST: ICD-10-PCS | Performed by: INTERNAL MEDICINE

## 2017-03-06 PROCEDURE — 6370000000 HC RX 637 (ALT 250 FOR IP): Performed by: HOSPITALIST

## 2017-03-06 PROCEDURE — 93458 L HRT ARTERY/VENTRICLE ANGIO: CPT | Performed by: INTERNAL MEDICINE

## 2017-03-06 PROCEDURE — 4A023N7 MEASUREMENT OF CARDIAC SAMPLING AND PRESSURE, LEFT HEART, PERCUTANEOUS APPROACH: ICD-10-PCS | Performed by: INTERNAL MEDICINE

## 2017-03-06 PROCEDURE — 76937 US GUIDE VASCULAR ACCESS: CPT

## 2017-03-06 PROCEDURE — 94762 N-INVAS EAR/PLS OXIMTRY CONT: CPT

## 2017-03-06 PROCEDURE — 2500000003 HC RX 250 WO HCPCS

## 2017-03-06 PROCEDURE — 80048 BASIC METABOLIC PNL TOTAL CA: CPT

## 2017-03-06 PROCEDURE — 2709999900 HC NON-CHARGEABLE SUPPLY

## 2017-03-06 PROCEDURE — 85027 COMPLETE CBC AUTOMATED: CPT

## 2017-03-06 RX ORDER — ACETAMINOPHEN 325 MG/1
650 TABLET ORAL EVERY 4 HOURS PRN
Status: DISCONTINUED | OUTPATIENT
Start: 2017-03-06 | End: 2017-03-07 | Stop reason: HOSPADM

## 2017-03-06 RX ORDER — AMLODIPINE BESYLATE 5 MG/1
5 TABLET ORAL NIGHTLY
Status: DISCONTINUED | OUTPATIENT
Start: 2017-03-06 | End: 2017-03-07 | Stop reason: HOSPADM

## 2017-03-06 RX ORDER — SODIUM CHLORIDE 9 MG/ML
INJECTION, SOLUTION INTRAVENOUS CONTINUOUS
Status: DISCONTINUED | OUTPATIENT
Start: 2017-03-06 | End: 2017-03-07

## 2017-03-06 RX ADMIN — INSULIN LISPRO 1 UNITS: 100 INJECTION, SOLUTION INTRAVENOUS; SUBCUTANEOUS at 21:05

## 2017-03-06 RX ADMIN — SODIUM CHLORIDE: 9 INJECTION, SOLUTION INTRAVENOUS at 14:00

## 2017-03-06 RX ADMIN — OXYBUTYNIN CHLORIDE 5 MG: 5 TABLET ORAL at 09:22

## 2017-03-06 RX ADMIN — CARVEDILOL 6.25 MG: 6.25 TABLET, FILM COATED ORAL at 09:22

## 2017-03-06 RX ADMIN — ACETAMINOPHEN 650 MG: 325 TABLET ORAL at 23:00

## 2017-03-06 RX ADMIN — ATORVASTATIN CALCIUM 80 MG: 80 TABLET, FILM COATED ORAL at 23:00

## 2017-03-06 RX ADMIN — AMITRIPTYLINE HYDROCHLORIDE 25 MG: 25 TABLET, FILM COATED ORAL at 23:00

## 2017-03-06 RX ADMIN — AMLODIPINE BESYLATE 5 MG: 5 TABLET ORAL at 21:03

## 2017-03-06 RX ADMIN — BUTALBITAL, ACETAMINOPHEN, AND CAFFEINE 1 TABLET: 50; 325; 40 TABLET ORAL at 14:58

## 2017-03-06 RX ADMIN — ENOXAPARIN SODIUM 30 MG: 40 INJECTION SUBCUTANEOUS at 23:00

## 2017-03-06 RX ADMIN — ISOSORBIDE MONONITRATE 30 MG: 30 TABLET ORAL at 09:22

## 2017-03-06 RX ADMIN — MORPHINE SULFATE 2 MG: 2 INJECTION, SOLUTION INTRAMUSCULAR; INTRAVENOUS at 21:00

## 2017-03-06 RX ADMIN — LOSARTAN POTASSIUM 50 MG: 50 TABLET, FILM COATED ORAL at 09:22

## 2017-03-06 RX ADMIN — CARVEDILOL 6.25 MG: 6.25 TABLET, FILM COATED ORAL at 17:00

## 2017-03-06 ASSESSMENT — PAIN SCALES - GENERAL
PAINLEVEL_OUTOF10: 7
PAINLEVEL_OUTOF10: 7
PAINLEVEL_OUTOF10: 4
PAINLEVEL_OUTOF10: 7
PAINLEVEL_OUTOF10: 4

## 2017-03-07 VITALS
HEART RATE: 66 BPM | TEMPERATURE: 97.5 F | DIASTOLIC BLOOD PRESSURE: 73 MMHG | OXYGEN SATURATION: 99 % | SYSTOLIC BLOOD PRESSURE: 151 MMHG | HEIGHT: 65 IN | RESPIRATION RATE: 20 BRPM | BODY MASS INDEX: 48.53 KG/M2 | WEIGHT: 291.25 LBS

## 2017-03-07 LAB
ANION GAP SERPL CALCULATED.3IONS-SCNC: 12 MMOL/L (ref 9–17)
BUN BLDV-MCNC: 16 MG/DL (ref 8–23)
BUN/CREAT BLD: ABNORMAL (ref 9–20)
CALCIUM SERPL-MCNC: 8.7 MG/DL (ref 8.6–10.4)
CHLORIDE BLD-SCNC: 105 MMOL/L (ref 98–107)
CO2: 24 MMOL/L (ref 20–31)
CREAT SERPL-MCNC: 0.75 MG/DL (ref 0.5–0.9)
EKG ATRIAL RATE: 83 BPM
EKG P AXIS: 38 DEGREES
EKG P-R INTERVAL: 158 MS
EKG Q-T INTERVAL: 390 MS
EKG QRS DURATION: 100 MS
EKG QTC CALCULATION (BAZETT): 458 MS
EKG R AXIS: -27 DEGREES
EKG T AXIS: 42 DEGREES
EKG VENTRICULAR RATE: 83 BPM
GFR AFRICAN AMERICAN: >60 ML/MIN
GFR NON-AFRICAN AMERICAN: >60 ML/MIN
GFR SERPL CREATININE-BSD FRML MDRD: ABNORMAL ML/MIN/{1.73_M2}
GFR SERPL CREATININE-BSD FRML MDRD: ABNORMAL ML/MIN/{1.73_M2}
GLUCOSE BLD-MCNC: 120 MG/DL (ref 65–105)
GLUCOSE BLD-MCNC: 126 MG/DL (ref 65–105)
GLUCOSE BLD-MCNC: 128 MG/DL (ref 70–99)
HCT VFR BLD CALC: 40.1 % (ref 36–46)
HEMOGLOBIN: 12.9 G/DL (ref 12–16)
POTASSIUM SERPL-SCNC: 4.4 MMOL/L (ref 3.7–5.3)
SODIUM BLD-SCNC: 141 MMOL/L (ref 135–144)

## 2017-03-07 PROCEDURE — 82947 ASSAY GLUCOSE BLOOD QUANT: CPT

## 2017-03-07 PROCEDURE — 6360000002 HC RX W HCPCS: Performed by: STUDENT IN AN ORGANIZED HEALTH CARE EDUCATION/TRAINING PROGRAM

## 2017-03-07 PROCEDURE — 85014 HEMATOCRIT: CPT

## 2017-03-07 PROCEDURE — 36415 COLL VENOUS BLD VENIPUNCTURE: CPT

## 2017-03-07 PROCEDURE — 94762 N-INVAS EAR/PLS OXIMTRY CONT: CPT

## 2017-03-07 PROCEDURE — 80048 BASIC METABOLIC PNL TOTAL CA: CPT

## 2017-03-07 PROCEDURE — 6370000000 HC RX 637 (ALT 250 FOR IP): Performed by: STUDENT IN AN ORGANIZED HEALTH CARE EDUCATION/TRAINING PROGRAM

## 2017-03-07 PROCEDURE — 85018 HEMOGLOBIN: CPT

## 2017-03-07 RX ORDER — BUTALBITAL, ACETAMINOPHEN AND CAFFEINE 50; 325; 40 MG/1; MG/1; MG/1
1 TABLET ORAL EVERY 4 HOURS PRN
Qty: 30 TABLET | Refills: 3 | Status: SHIPPED | OUTPATIENT
Start: 2017-03-07 | End: 2017-03-16 | Stop reason: SDUPTHER

## 2017-03-07 RX ADMIN — CARVEDILOL 6.25 MG: 6.25 TABLET, FILM COATED ORAL at 09:24

## 2017-03-07 RX ADMIN — ENOXAPARIN SODIUM 30 MG: 40 INJECTION SUBCUTANEOUS at 09:24

## 2017-03-07 RX ADMIN — CLOPIDOGREL 75 MG: 75 TABLET, FILM COATED ORAL at 09:24

## 2017-03-07 RX ADMIN — ASPIRIN 81 MG: 81 TABLET, COATED ORAL at 09:24

## 2017-03-07 RX ADMIN — ISOSORBIDE MONONITRATE 30 MG: 30 TABLET ORAL at 09:24

## 2017-03-07 RX ADMIN — OXYBUTYNIN CHLORIDE 5 MG: 5 TABLET ORAL at 09:24

## 2017-03-08 ENCOUNTER — CARE COORDINATION (OUTPATIENT)
Dept: CASE MANAGEMENT | Age: 67
End: 2017-03-08

## 2017-03-08 ENCOUNTER — TELEPHONE (OUTPATIENT)
Dept: PHARMACY | Age: 67
End: 2017-03-08

## 2017-03-08 ENCOUNTER — TELEPHONE (OUTPATIENT)
Dept: INTERNAL MEDICINE | Facility: CLINIC | Age: 67
End: 2017-03-08

## 2017-03-10 ENCOUNTER — CARE COORDINATION (OUTPATIENT)
Dept: CASE MANAGEMENT | Age: 67
End: 2017-03-10

## 2017-03-10 RX ORDER — AMLODIPINE BESYLATE 5 MG/1
5 TABLET ORAL NIGHTLY
COMMUNITY
End: 2017-03-16 | Stop reason: SDUPTHER

## 2017-03-10 RX ORDER — CARVEDILOL 25 MG/1
25 TABLET ORAL DAILY
Status: ON HOLD | COMMUNITY
End: 2017-09-09 | Stop reason: HOSPADM

## 2017-03-10 RX ORDER — ACETAMINOPHEN 500 MG
1000 TABLET ORAL NIGHTLY PRN
COMMUNITY
End: 2017-09-26 | Stop reason: ALTCHOICE

## 2017-03-13 ENCOUNTER — CARE COORDINATION (OUTPATIENT)
Dept: CASE MANAGEMENT | Age: 67
End: 2017-03-13

## 2017-03-16 ENCOUNTER — OFFICE VISIT (OUTPATIENT)
Dept: INTERNAL MEDICINE | Age: 67
End: 2017-03-16
Payer: MEDICARE

## 2017-03-16 VITALS
DIASTOLIC BLOOD PRESSURE: 73 MMHG | WEIGHT: 293 LBS | BODY MASS INDEX: 48.82 KG/M2 | OXYGEN SATURATION: 93 % | HEART RATE: 66 BPM | SYSTOLIC BLOOD PRESSURE: 116 MMHG | HEIGHT: 65 IN

## 2017-03-16 DIAGNOSIS — D69.6 THROMBOCYTOPENIA (HCC): ICD-10-CM

## 2017-03-16 DIAGNOSIS — G63 NEUROPATHY ASSOCIATED WITH ENDOCRINE DISORDER (HCC): ICD-10-CM

## 2017-03-16 DIAGNOSIS — Z23 NEED FOR PROPHYLACTIC VACCINATION AGAINST STREPTOCOCCUS PNEUMONIAE (PNEUMOCOCCUS) AND INFLUENZA: ICD-10-CM

## 2017-03-16 DIAGNOSIS — G43.011 INTRACTABLE MIGRAINE WITHOUT AURA AND WITH STATUS MIGRAINOSUS: ICD-10-CM

## 2017-03-16 DIAGNOSIS — E11.42 TYPE 2 DIABETES MELLITUS WITH DIABETIC POLYNEUROPATHY, WITHOUT LONG-TERM CURRENT USE OF INSULIN (HCC): ICD-10-CM

## 2017-03-16 DIAGNOSIS — E66.01 MORBID OBESITY WITH BMI OF 45.0-49.9, ADULT (HCC): ICD-10-CM

## 2017-03-16 DIAGNOSIS — I50.32 HEART FAILURE, DIASTOLIC, CHRONIC (HCC): ICD-10-CM

## 2017-03-16 DIAGNOSIS — I25.10 CORONARY ARTERY DISEASE INVOLVING NATIVE CORONARY ARTERY OF NATIVE HEART WITHOUT ANGINA PECTORIS: Primary | Chronic | ICD-10-CM

## 2017-03-16 DIAGNOSIS — E34.9 NEUROPATHY ASSOCIATED WITH ENDOCRINE DISORDER (HCC): ICD-10-CM

## 2017-03-16 PROBLEM — N17.9 AKI (ACUTE KIDNEY INJURY) (HCC): Status: RESOLVED | Noted: 2017-03-03 | Resolved: 2017-03-16

## 2017-03-16 PROCEDURE — 1036F TOBACCO NON-USER: CPT | Performed by: INTERNAL MEDICINE

## 2017-03-16 PROCEDURE — G8399 PT W/DXA RESULTS DOCUMENT: HCPCS | Performed by: INTERNAL MEDICINE

## 2017-03-16 PROCEDURE — 3014F SCREEN MAMMO DOC REV: CPT | Performed by: INTERNAL MEDICINE

## 2017-03-16 PROCEDURE — 1111F DSCHRG MED/CURRENT MED MERGE: CPT | Performed by: INTERNAL MEDICINE

## 2017-03-16 PROCEDURE — 90732 PPSV23 VACC 2 YRS+ SUBQ/IM: CPT | Performed by: INTERNAL MEDICINE

## 2017-03-16 PROCEDURE — G8484 FLU IMMUNIZE NO ADMIN: HCPCS | Performed by: INTERNAL MEDICINE

## 2017-03-16 PROCEDURE — 4040F PNEUMOC VAC/ADMIN/RCVD: CPT | Performed by: INTERNAL MEDICINE

## 2017-03-16 PROCEDURE — G8417 CALC BMI ABV UP PARAM F/U: HCPCS | Performed by: INTERNAL MEDICINE

## 2017-03-16 PROCEDURE — 99214 OFFICE O/P EST MOD 30 MIN: CPT | Performed by: INTERNAL MEDICINE

## 2017-03-16 PROCEDURE — G8598 ASA/ANTIPLAT THER USED: HCPCS | Performed by: INTERNAL MEDICINE

## 2017-03-16 PROCEDURE — A9150 MISC/EXPER NON-PRESCRIPT DRU: HCPCS | Performed by: INTERNAL MEDICINE

## 2017-03-16 PROCEDURE — G8427 DOCREV CUR MEDS BY ELIG CLIN: HCPCS | Performed by: INTERNAL MEDICINE

## 2017-03-16 PROCEDURE — 1090F PRES/ABSN URINE INCON ASSESS: CPT | Performed by: INTERNAL MEDICINE

## 2017-03-16 PROCEDURE — 3017F COLORECTAL CA SCREEN DOC REV: CPT | Performed by: INTERNAL MEDICINE

## 2017-03-16 PROCEDURE — 1123F ACP DISCUSS/DSCN MKR DOCD: CPT | Performed by: INTERNAL MEDICINE

## 2017-03-16 PROCEDURE — 3044F HG A1C LEVEL LT 7.0%: CPT | Performed by: INTERNAL MEDICINE

## 2017-03-16 PROCEDURE — G0009 ADMIN PNEUMOCOCCAL VACCINE: HCPCS | Performed by: INTERNAL MEDICINE

## 2017-03-16 RX ORDER — AMLODIPINE BESYLATE 5 MG/1
5 TABLET ORAL NIGHTLY
Qty: 30 TABLET | Refills: 3 | Status: SHIPPED | OUTPATIENT
Start: 2017-03-16 | End: 2017-05-22 | Stop reason: SDUPTHER

## 2017-03-16 RX ORDER — TRAMADOL HYDROCHLORIDE 50 MG/1
50 TABLET ORAL 2 TIMES DAILY PRN
Qty: 60 TABLET | Refills: 0 | Status: CANCELLED | OUTPATIENT
Start: 2017-03-16

## 2017-03-16 RX ORDER — ISOSORBIDE MONONITRATE 30 MG/1
30 TABLET, EXTENDED RELEASE ORAL DAILY
Qty: 30 TABLET | Refills: 3 | Status: SHIPPED | OUTPATIENT
Start: 2017-03-16 | End: 2017-03-31 | Stop reason: ALTCHOICE

## 2017-03-16 RX ORDER — BUTALBITAL, ACETAMINOPHEN AND CAFFEINE 50; 325; 40 MG/1; MG/1; MG/1
1 TABLET ORAL EVERY 4 HOURS PRN
Qty: 30 TABLET | Refills: 3 | Status: ON HOLD | OUTPATIENT
Start: 2017-03-16 | End: 2017-09-09 | Stop reason: HOSPADM

## 2017-03-17 ENCOUNTER — CARE COORDINATION (OUTPATIENT)
Dept: CASE MANAGEMENT | Age: 67
End: 2017-03-17

## 2017-03-23 RX ORDER — AMITRIPTYLINE HYDROCHLORIDE 25 MG/1
TABLET, FILM COATED ORAL
Qty: 90 TABLET | Refills: 3 | Status: ON HOLD | OUTPATIENT
Start: 2017-03-23 | End: 2017-09-09 | Stop reason: HOSPADM

## 2017-03-31 ENCOUNTER — CARE COORDINATION (OUTPATIENT)
Dept: CARE COORDINATION | Age: 67
End: 2017-03-31

## 2017-04-03 ENCOUNTER — TELEPHONE (OUTPATIENT)
Dept: CARE COORDINATION | Age: 67
End: 2017-04-03

## 2017-04-04 ENCOUNTER — TELEPHONE (OUTPATIENT)
Dept: CARE COORDINATION | Age: 67
End: 2017-04-04

## 2017-04-06 ENCOUNTER — TELEPHONE (OUTPATIENT)
Dept: CARE COORDINATION | Age: 67
End: 2017-04-06

## 2017-04-06 ENCOUNTER — CARE COORDINATION (OUTPATIENT)
Dept: CARE COORDINATION | Age: 67
End: 2017-04-06

## 2017-04-07 ENCOUNTER — TELEPHONE (OUTPATIENT)
Dept: CARE COORDINATION | Age: 67
End: 2017-04-07

## 2017-04-11 ENCOUNTER — CARE COORDINATION (OUTPATIENT)
Dept: CARE COORDINATION | Age: 67
End: 2017-04-11

## 2017-04-25 ENCOUNTER — HOSPITAL ENCOUNTER (OUTPATIENT)
Dept: MRI IMAGING | Age: 67
Discharge: HOME | End: 2017-04-25
Payer: MEDICARE

## 2017-04-25 DIAGNOSIS — R51.9 INTRACTABLE HEADACHE, UNSPECIFIED CHRONICITY PATTERN, UNSPECIFIED HEADACHE TYPE: ICD-10-CM

## 2017-04-25 LAB — POC CREATININE: 1 MG/DL (ref 0.6–1.4)

## 2017-04-25 PROCEDURE — 82565 ASSAY OF CREATININE: CPT

## 2017-04-25 PROCEDURE — 6360000004 HC RX CONTRAST MEDICATION: Performed by: NURSE PRACTITIONER

## 2017-04-25 PROCEDURE — 70553 MRI BRAIN STEM W/O & W/DYE: CPT

## 2017-04-25 PROCEDURE — A9579 GAD-BASE MR CONTRAST NOS,1ML: HCPCS | Performed by: NURSE PRACTITIONER

## 2017-04-25 RX ORDER — SODIUM CHLORIDE 0.9 % (FLUSH) 0.9 %
10 SYRINGE (ML) INJECTION 2 TIMES DAILY
Status: DISCONTINUED | OUTPATIENT
Start: 2017-04-25 | End: 2017-04-28 | Stop reason: HOSPADM

## 2017-04-25 RX ADMIN — GADOPENTETATE DIMEGLUMINE 20 ML: 469.01 INJECTION INTRAVENOUS at 09:22

## 2017-05-22 DIAGNOSIS — I25.10 CORONARY ARTERY DISEASE INVOLVING NATIVE CORONARY ARTERY OF NATIVE HEART WITHOUT ANGINA PECTORIS: Chronic | ICD-10-CM

## 2017-05-24 RX ORDER — ISOSORBIDE MONONITRATE 30 MG/1
TABLET, EXTENDED RELEASE ORAL
Qty: 90 TABLET | Refills: 3 | Status: ON HOLD | OUTPATIENT
Start: 2017-05-24 | End: 2017-09-09 | Stop reason: HOSPADM

## 2017-05-24 RX ORDER — AMLODIPINE BESYLATE 5 MG/1
TABLET ORAL
Qty: 90 TABLET | Refills: 3 | Status: SHIPPED | OUTPATIENT
Start: 2017-05-24 | End: 2018-01-04 | Stop reason: ALTCHOICE

## 2017-06-01 ENCOUNTER — TELEPHONE (OUTPATIENT)
Dept: INTERNAL MEDICINE | Age: 67
End: 2017-06-01

## 2017-07-10 DIAGNOSIS — E11.69 DIABETES MELLITUS TYPE 2 IN OBESE (HCC): ICD-10-CM

## 2017-07-10 DIAGNOSIS — E66.9 DIABETES MELLITUS TYPE 2 IN OBESE (HCC): ICD-10-CM

## 2017-07-11 RX ORDER — OXYBUTYNIN CHLORIDE 5 MG/1
TABLET ORAL
Qty: 90 TABLET | Refills: 5 | Status: SHIPPED | OUTPATIENT
Start: 2017-07-11 | End: 2018-02-13 | Stop reason: SDUPTHER

## 2017-07-20 ENCOUNTER — TELEPHONE (OUTPATIENT)
Dept: INTERNAL MEDICINE | Age: 67
End: 2017-07-20

## 2017-07-20 ENCOUNTER — HOSPITAL ENCOUNTER (OUTPATIENT)
Age: 67
Setting detail: SPECIMEN
Discharge: HOME | End: 2017-07-20
Payer: MEDICARE

## 2017-07-20 ENCOUNTER — OFFICE VISIT (OUTPATIENT)
Dept: INTERNAL MEDICINE | Age: 67
End: 2017-07-20
Payer: MEDICARE

## 2017-07-20 VITALS
OXYGEN SATURATION: 99 % | WEIGHT: 293 LBS | SYSTOLIC BLOOD PRESSURE: 133 MMHG | HEIGHT: 65 IN | HEART RATE: 85 BPM | DIASTOLIC BLOOD PRESSURE: 83 MMHG | BODY MASS INDEX: 48.82 KG/M2

## 2017-07-20 DIAGNOSIS — G47.33 OBSTRUCTIVE SLEEP APNEA SYNDROME: ICD-10-CM

## 2017-07-20 DIAGNOSIS — I10 ESSENTIAL HYPERTENSION: ICD-10-CM

## 2017-07-20 DIAGNOSIS — E11.42 TYPE 2 DIABETES MELLITUS WITH DIABETIC POLYNEUROPATHY, WITHOUT LONG-TERM CURRENT USE OF INSULIN (HCC): Primary | ICD-10-CM

## 2017-07-20 DIAGNOSIS — I25.10 CORONARY ARTERY DISEASE INVOLVING NATIVE CORONARY ARTERY OF NATIVE HEART WITHOUT ANGINA PECTORIS: Chronic | ICD-10-CM

## 2017-07-20 DIAGNOSIS — Z12.11 COLON CANCER SCREENING: ICD-10-CM

## 2017-07-20 DIAGNOSIS — E11.42 TYPE 2 DIABETES MELLITUS WITH DIABETIC POLYNEUROPATHY, WITHOUT LONG-TERM CURRENT USE OF INSULIN (HCC): ICD-10-CM

## 2017-07-20 LAB
CONTROL: PRESENT
CREATININE URINE: 388.4 MG/DL (ref 28–217)
HBA1C MFR BLD: 6.6 %
HEMOCCULT STL QL: NORMAL
MICROALBUMIN/CREAT 24H UR: 16 MG/L
MICROALBUMIN/CREAT UR-RTO: 4 MCG/MG CREAT

## 2017-07-20 PROCEDURE — 82274 ASSAY TEST FOR BLOOD FECAL: CPT | Performed by: INTERNAL MEDICINE

## 2017-07-20 PROCEDURE — 1090F PRES/ABSN URINE INCON ASSESS: CPT | Performed by: INTERNAL MEDICINE

## 2017-07-20 PROCEDURE — 3046F HEMOGLOBIN A1C LEVEL >9.0%: CPT | Performed by: INTERNAL MEDICINE

## 2017-07-20 PROCEDURE — 1036F TOBACCO NON-USER: CPT | Performed by: INTERNAL MEDICINE

## 2017-07-20 PROCEDURE — 3017F COLORECTAL CA SCREEN DOC REV: CPT | Performed by: INTERNAL MEDICINE

## 2017-07-20 PROCEDURE — 4040F PNEUMOC VAC/ADMIN/RCVD: CPT | Performed by: INTERNAL MEDICINE

## 2017-07-20 PROCEDURE — 1123F ACP DISCUSS/DSCN MKR DOCD: CPT | Performed by: INTERNAL MEDICINE

## 2017-07-20 PROCEDURE — G8427 DOCREV CUR MEDS BY ELIG CLIN: HCPCS | Performed by: INTERNAL MEDICINE

## 2017-07-20 PROCEDURE — 83036 HEMOGLOBIN GLYCOSYLATED A1C: CPT | Performed by: INTERNAL MEDICINE

## 2017-07-20 PROCEDURE — 99214 OFFICE O/P EST MOD 30 MIN: CPT | Performed by: INTERNAL MEDICINE

## 2017-07-20 PROCEDURE — G8598 ASA/ANTIPLAT THER USED: HCPCS | Performed by: INTERNAL MEDICINE

## 2017-07-20 PROCEDURE — G8399 PT W/DXA RESULTS DOCUMENT: HCPCS | Performed by: INTERNAL MEDICINE

## 2017-07-20 PROCEDURE — 3014F SCREEN MAMMO DOC REV: CPT | Performed by: INTERNAL MEDICINE

## 2017-07-20 PROCEDURE — G8417 CALC BMI ABV UP PARAM F/U: HCPCS | Performed by: INTERNAL MEDICINE

## 2017-07-20 ASSESSMENT — ENCOUNTER SYMPTOMS
SORE THROAT: 0
SINUS PRESSURE: 0
ABDOMINAL DISTENTION: 0
PHOTOPHOBIA: 0
CHEST TIGHTNESS: 0
BLOOD IN STOOL: 0
BACK PAIN: 0
CHOKING: 0
APNEA: 0
ABDOMINAL PAIN: 0

## 2017-09-03 ENCOUNTER — HOSPITAL ENCOUNTER (INPATIENT)
Age: 67
LOS: 6 days | Discharge: HOME HEALTH | DRG: 641 | End: 2017-09-09
Attending: INTERNAL MEDICINE | Admitting: INTERNAL MEDICINE
Payer: MEDICARE

## 2017-09-03 DIAGNOSIS — G47.33 OBSTRUCTIVE SLEEP APNEA SYNDROME: Primary | ICD-10-CM

## 2017-09-03 DIAGNOSIS — Z86.73 HISTORY OF RECURRENT TIAS: ICD-10-CM

## 2017-09-03 PROCEDURE — 2060000000 HC ICU INTERMEDIATE R&B

## 2017-09-03 PROCEDURE — 82947 ASSAY GLUCOSE BLOOD QUANT: CPT

## 2017-09-03 RX ORDER — ISOSORBIDE MONONITRATE 30 MG/1
30 TABLET, EXTENDED RELEASE ORAL DAILY
Status: DISCONTINUED | OUTPATIENT
Start: 2017-09-04 | End: 2017-09-04

## 2017-09-03 RX ORDER — DOCUSATE SODIUM 100 MG/1
200 CAPSULE, LIQUID FILLED ORAL DAILY
Status: ON HOLD | COMMUNITY
End: 2017-09-09 | Stop reason: HOSPADM

## 2017-09-03 RX ORDER — SODIUM CHLORIDE 0.9 % (FLUSH) 0.9 %
10 SYRINGE (ML) INJECTION PRN
Status: DISCONTINUED | OUTPATIENT
Start: 2017-09-03 | End: 2017-09-09 | Stop reason: HOSPADM

## 2017-09-03 RX ORDER — SODIUM CHLORIDE 0.9 % (FLUSH) 0.9 %
10 SYRINGE (ML) INJECTION EVERY 12 HOURS SCHEDULED
Status: DISCONTINUED | OUTPATIENT
Start: 2017-09-04 | End: 2017-09-09 | Stop reason: HOSPADM

## 2017-09-03 RX ORDER — ATORVASTATIN CALCIUM 80 MG/1
80 TABLET, FILM COATED ORAL NIGHTLY
Status: DISCONTINUED | OUTPATIENT
Start: 2017-09-04 | End: 2017-09-09 | Stop reason: HOSPADM

## 2017-09-03 RX ORDER — FUROSEMIDE 40 MG/1
40 TABLET ORAL 2 TIMES DAILY
Status: DISCONTINUED | OUTPATIENT
Start: 2017-09-04 | End: 2017-09-04

## 2017-09-03 RX ORDER — AMITRIPTYLINE HYDROCHLORIDE 25 MG/1
25 TABLET, FILM COATED ORAL NIGHTLY
Status: DISCONTINUED | OUTPATIENT
Start: 2017-09-04 | End: 2017-09-09

## 2017-09-03 RX ORDER — ONDANSETRON 2 MG/ML
4 INJECTION INTRAMUSCULAR; INTRAVENOUS EVERY 6 HOURS PRN
Status: DISCONTINUED | OUTPATIENT
Start: 2017-09-03 | End: 2017-09-09 | Stop reason: HOSPADM

## 2017-09-03 RX ORDER — ACETAMINOPHEN 325 MG/1
650 TABLET ORAL EVERY 6 HOURS PRN
Status: DISCONTINUED | OUTPATIENT
Start: 2017-09-03 | End: 2017-09-09 | Stop reason: HOSPADM

## 2017-09-03 RX ORDER — AMLODIPINE BESYLATE 5 MG/1
5 TABLET ORAL NIGHTLY
Status: DISCONTINUED | OUTPATIENT
Start: 2017-09-04 | End: 2017-09-06

## 2017-09-03 RX ORDER — SODIUM CHLORIDE 9 MG/ML
INJECTION, SOLUTION INTRAVENOUS CONTINUOUS
Status: DISCONTINUED | OUTPATIENT
Start: 2017-09-04 | End: 2017-09-09 | Stop reason: HOSPADM

## 2017-09-03 RX ORDER — OXYBUTYNIN CHLORIDE 5 MG/1
5 TABLET ORAL DAILY
Status: DISCONTINUED | OUTPATIENT
Start: 2017-09-04 | End: 2017-09-09 | Stop reason: HOSPADM

## 2017-09-03 RX ORDER — BUTALBITAL, ACETAMINOPHEN AND CAFFEINE 50; 325; 40 MG/1; MG/1; MG/1
1 TABLET ORAL EVERY 4 HOURS PRN
Status: DISCONTINUED | OUTPATIENT
Start: 2017-09-03 | End: 2017-09-09 | Stop reason: HOSPADM

## 2017-09-03 RX ORDER — FAMOTIDINE 20 MG/1
20 TABLET, FILM COATED ORAL 2 TIMES DAILY
Status: DISCONTINUED | OUTPATIENT
Start: 2017-09-04 | End: 2017-09-09 | Stop reason: HOSPADM

## 2017-09-03 RX ORDER — ASPIRIN 81 MG/1
81 TABLET ORAL DAILY
Status: DISCONTINUED | OUTPATIENT
Start: 2017-09-04 | End: 2017-09-09 | Stop reason: HOSPADM

## 2017-09-03 RX ORDER — ACETAMINOPHEN 325 MG/1
650 TABLET ORAL EVERY 4 HOURS PRN
Status: DISCONTINUED | OUTPATIENT
Start: 2017-09-03 | End: 2017-09-03 | Stop reason: SDUPTHER

## 2017-09-03 RX ORDER — CLOPIDOGREL BISULFATE 75 MG/1
75 TABLET ORAL DAILY
Status: DISCONTINUED | OUTPATIENT
Start: 2017-09-04 | End: 2017-09-09 | Stop reason: HOSPADM

## 2017-09-03 RX ORDER — CARVEDILOL 25 MG/1
25 TABLET ORAL DAILY
Status: DISCONTINUED | OUTPATIENT
Start: 2017-09-04 | End: 2017-09-04

## 2017-09-03 ASSESSMENT — PAIN DESCRIPTION - PAIN TYPE: TYPE: CHRONIC PAIN

## 2017-09-03 ASSESSMENT — PAIN SCALES - GENERAL: PAINLEVEL_OUTOF10: 3

## 2017-09-03 ASSESSMENT — PAIN DESCRIPTION - LOCATION: LOCATION: HEAD

## 2017-09-04 PROBLEM — R55 SYNCOPE AND COLLAPSE: Status: ACTIVE | Noted: 2017-09-04

## 2017-09-04 PROBLEM — E86.9 VOLUME DEPLETION: Status: ACTIVE | Noted: 2017-09-04

## 2017-09-04 LAB
ABSOLUTE EOS #: 0.1 K/UL (ref 0–0.4)
ABSOLUTE LYMPH #: 2.3 K/UL (ref 1–4.8)
ABSOLUTE MONO #: 0.6 K/UL (ref 0.1–1.2)
ANION GAP SERPL CALCULATED.3IONS-SCNC: 8 MMOL/L (ref 9–17)
BASOPHILS # BLD: 0 %
BASOPHILS ABSOLUTE: 0 K/UL (ref 0–0.2)
BUN BLDV-MCNC: 30 MG/DL (ref 8–23)
BUN/CREAT BLD: ABNORMAL (ref 9–20)
CALCIUM SERPL-MCNC: 8.5 MG/DL (ref 8.6–10.4)
CHLORIDE BLD-SCNC: 100 MMOL/L (ref 98–107)
CO2: 25 MMOL/L (ref 20–31)
CREAT SERPL-MCNC: 0.85 MG/DL (ref 0.5–0.9)
DIFFERENTIAL TYPE: ABNORMAL
EOSINOPHILS RELATIVE PERCENT: 2 %
GFR AFRICAN AMERICAN: >60 ML/MIN
GFR NON-AFRICAN AMERICAN: >60 ML/MIN
GFR SERPL CREATININE-BSD FRML MDRD: ABNORMAL ML/MIN/{1.73_M2}
GFR SERPL CREATININE-BSD FRML MDRD: ABNORMAL ML/MIN/{1.73_M2}
GLUCOSE BLD-MCNC: 110 MG/DL (ref 65–105)
GLUCOSE BLD-MCNC: 115 MG/DL (ref 70–99)
GLUCOSE BLD-MCNC: 119 MG/DL (ref 65–105)
GLUCOSE BLD-MCNC: 131 MG/DL (ref 65–105)
GLUCOSE BLD-MCNC: 161 MG/DL (ref 65–105)
HCT VFR BLD CALC: 35.5 % (ref 36–46)
HEMOGLOBIN: 11.8 G/DL (ref 12–16)
LYMPHOCYTES # BLD: 33 %
MCH RBC QN AUTO: 30.3 PG (ref 26–34)
MCHC RBC AUTO-ENTMCNC: 33.4 G/DL (ref 31–37)
MCV RBC AUTO: 90.8 FL (ref 80–100)
MONOCYTES # BLD: 8 %
PDW BLD-RTO: 15 % (ref 12.5–15.4)
PLATELET # BLD: 144 K/UL (ref 140–450)
PLATELET ESTIMATE: ABNORMAL
PMV BLD AUTO: 8.1 FL (ref 6–12)
POTASSIUM SERPL-SCNC: 4.2 MMOL/L (ref 3.7–5.3)
RBC # BLD: 3.9 M/UL (ref 4–5.2)
RBC # BLD: ABNORMAL 10*6/UL
SEG NEUTROPHILS: 57 %
SEGMENTED NEUTROPHILS ABSOLUTE COUNT: 4 K/UL (ref 1.8–7.7)
SODIUM BLD-SCNC: 133 MMOL/L (ref 135–144)
TROPONIN INTERP: NORMAL
TROPONIN T: <0.03 NG/ML
WBC # BLD: 7.1 K/UL (ref 3.5–11)
WBC # BLD: ABNORMAL 10*3/UL

## 2017-09-04 PROCEDURE — 6370000000 HC RX 637 (ALT 250 FOR IP): Performed by: STUDENT IN AN ORGANIZED HEALTH CARE EDUCATION/TRAINING PROGRAM

## 2017-09-04 PROCEDURE — 80048 BASIC METABOLIC PNL TOTAL CA: CPT

## 2017-09-04 PROCEDURE — 2060000000 HC ICU INTERMEDIATE R&B

## 2017-09-04 PROCEDURE — 6360000002 HC RX W HCPCS: Performed by: INTERNAL MEDICINE

## 2017-09-04 PROCEDURE — 36415 COLL VENOUS BLD VENIPUNCTURE: CPT

## 2017-09-04 PROCEDURE — 84484 ASSAY OF TROPONIN QUANT: CPT

## 2017-09-04 PROCEDURE — 6370000000 HC RX 637 (ALT 250 FOR IP): Performed by: INTERNAL MEDICINE

## 2017-09-04 PROCEDURE — 93005 ELECTROCARDIOGRAM TRACING: CPT

## 2017-09-04 PROCEDURE — 85025 COMPLETE CBC W/AUTO DIFF WBC: CPT

## 2017-09-04 PROCEDURE — 82947 ASSAY GLUCOSE BLOOD QUANT: CPT

## 2017-09-04 PROCEDURE — 94762 N-INVAS EAR/PLS OXIMTRY CONT: CPT

## 2017-09-04 PROCEDURE — 2580000003 HC RX 258: Performed by: INTERNAL MEDICINE

## 2017-09-04 RX ORDER — FUROSEMIDE 40 MG/1
40 TABLET ORAL DAILY
Status: DISCONTINUED | OUTPATIENT
Start: 2017-09-05 | End: 2017-09-09

## 2017-09-04 RX ORDER — ISOSORBIDE MONONITRATE 30 MG/1
30 TABLET, EXTENDED RELEASE ORAL DAILY
Status: DISCONTINUED | OUTPATIENT
Start: 2017-09-04 | End: 2017-09-09

## 2017-09-04 RX ORDER — CARVEDILOL 12.5 MG/1
12.5 TABLET ORAL DAILY
Status: DISCONTINUED | OUTPATIENT
Start: 2017-09-04 | End: 2017-09-09 | Stop reason: HOSPADM

## 2017-09-04 RX ADMIN — ATORVASTATIN CALCIUM 80 MG: 80 TABLET, FILM COATED ORAL at 01:31

## 2017-09-04 RX ADMIN — ACETAMINOPHEN 650 MG: 325 TABLET ORAL at 20:26

## 2017-09-04 RX ADMIN — ENOXAPARIN SODIUM 40 MG: 40 INJECTION SUBCUTANEOUS at 12:01

## 2017-09-04 RX ADMIN — ATORVASTATIN CALCIUM 80 MG: 80 TABLET, FILM COATED ORAL at 20:26

## 2017-09-04 RX ADMIN — AMITRIPTYLINE HYDROCHLORIDE 25 MG: 25 TABLET, FILM COATED ORAL at 20:26

## 2017-09-04 RX ADMIN — AMITRIPTYLINE HYDROCHLORIDE 25 MG: 25 TABLET, FILM COATED ORAL at 01:31

## 2017-09-04 RX ADMIN — Medication 10 ML: at 20:26

## 2017-09-04 RX ADMIN — SODIUM CHLORIDE: 9 INJECTION, SOLUTION INTRAVENOUS at 01:30

## 2017-09-04 RX ADMIN — AMLODIPINE BESYLATE 5 MG: 5 TABLET ORAL at 20:26

## 2017-09-04 RX ADMIN — ACETAMINOPHEN 650 MG: 325 TABLET ORAL at 04:29

## 2017-09-04 RX ADMIN — CLOPIDOGREL 75 MG: 75 TABLET, FILM COATED ORAL at 08:41

## 2017-09-04 RX ADMIN — FAMOTIDINE 20 MG: 20 TABLET, FILM COATED ORAL at 01:31

## 2017-09-04 RX ADMIN — ASPIRIN 81 MG: 81 TABLET ORAL at 08:41

## 2017-09-04 RX ADMIN — FAMOTIDINE 20 MG: 20 TABLET, FILM COATED ORAL at 08:41

## 2017-09-04 RX ADMIN — BUTALBITAL, ACETAMINOPHEN, AND CAFFEINE 1 TABLET: 50; 325; 40 TABLET ORAL at 06:24

## 2017-09-04 RX ADMIN — OXYBUTYNIN CHLORIDE 5 MG: 5 TABLET ORAL at 12:01

## 2017-09-04 RX ADMIN — METFORMIN HYDROCHLORIDE 1000 MG: 500 TABLET ORAL at 08:41

## 2017-09-04 RX ADMIN — AMLODIPINE BESYLATE 5 MG: 5 TABLET ORAL at 01:31

## 2017-09-04 RX ADMIN — FAMOTIDINE 20 MG: 20 TABLET, FILM COATED ORAL at 20:26

## 2017-09-04 RX ADMIN — BUTALBITAL, ACETAMINOPHEN, AND CAFFEINE 1 TABLET: 50; 325; 40 TABLET ORAL at 12:01

## 2017-09-04 RX ADMIN — CARVEDILOL 12.5 MG: 12.5 TABLET, FILM COATED ORAL at 12:01

## 2017-09-04 RX ADMIN — ISOSORBIDE MONONITRATE 30 MG: 30 TABLET ORAL at 18:49

## 2017-09-04 ASSESSMENT — PAIN SCALES - GENERAL
PAINLEVEL_OUTOF10: 3
PAINLEVEL_OUTOF10: 5
PAINLEVEL_OUTOF10: 2
PAINLEVEL_OUTOF10: 6
PAINLEVEL_OUTOF10: 2
PAINLEVEL_OUTOF10: 7
PAINLEVEL_OUTOF10: 4
PAINLEVEL_OUTOF10: 4
PAINLEVEL_OUTOF10: 5
PAINLEVEL_OUTOF10: 4

## 2017-09-05 ENCOUNTER — APPOINTMENT (OUTPATIENT)
Dept: GENERAL RADIOLOGY | Age: 67
DRG: 641 | End: 2017-09-05
Attending: INTERNAL MEDICINE
Payer: MEDICARE

## 2017-09-05 LAB
ANION GAP SERPL CALCULATED.3IONS-SCNC: 12 MMOL/L (ref 9–17)
BUN BLDV-MCNC: 23 MG/DL (ref 8–23)
BUN/CREAT BLD: ABNORMAL (ref 9–20)
CALCIUM SERPL-MCNC: 8.6 MG/DL (ref 8.6–10.4)
CHLORIDE BLD-SCNC: 107 MMOL/L (ref 98–107)
CO2: 24 MMOL/L (ref 20–31)
CREAT SERPL-MCNC: 0.79 MG/DL (ref 0.5–0.9)
EKG ATRIAL RATE: 87 BPM
EKG P AXIS: 65 DEGREES
EKG P-R INTERVAL: 194 MS
EKG Q-T INTERVAL: 396 MS
EKG QRS DURATION: 94 MS
EKG QTC CALCULATION (BAZETT): 476 MS
EKG R AXIS: -32 DEGREES
EKG T AXIS: 54 DEGREES
EKG VENTRICULAR RATE: 87 BPM
GFR AFRICAN AMERICAN: >60 ML/MIN
GFR NON-AFRICAN AMERICAN: >60 ML/MIN
GFR SERPL CREATININE-BSD FRML MDRD: ABNORMAL ML/MIN/{1.73_M2}
GFR SERPL CREATININE-BSD FRML MDRD: ABNORMAL ML/MIN/{1.73_M2}
GLUCOSE BLD-MCNC: 103 MG/DL (ref 65–105)
GLUCOSE BLD-MCNC: 107 MG/DL (ref 65–105)
GLUCOSE BLD-MCNC: 114 MG/DL (ref 70–99)
GLUCOSE BLD-MCNC: 93 MG/DL (ref 65–105)
POTASSIUM SERPL-SCNC: 4.7 MMOL/L (ref 3.7–5.3)
SODIUM BLD-SCNC: 143 MMOL/L (ref 135–144)

## 2017-09-05 PROCEDURE — 6370000000 HC RX 637 (ALT 250 FOR IP): Performed by: STUDENT IN AN ORGANIZED HEALTH CARE EDUCATION/TRAINING PROGRAM

## 2017-09-05 PROCEDURE — 6370000000 HC RX 637 (ALT 250 FOR IP): Performed by: INTERNAL MEDICINE

## 2017-09-05 PROCEDURE — 94762 N-INVAS EAR/PLS OXIMTRY CONT: CPT

## 2017-09-05 PROCEDURE — 76937 US GUIDE VASCULAR ACCESS: CPT

## 2017-09-05 PROCEDURE — 82947 ASSAY GLUCOSE BLOOD QUANT: CPT

## 2017-09-05 PROCEDURE — 2580000003 HC RX 258: Performed by: INTERNAL MEDICINE

## 2017-09-05 PROCEDURE — 6360000002 HC RX W HCPCS: Performed by: INTERNAL MEDICINE

## 2017-09-05 PROCEDURE — 80048 BASIC METABOLIC PNL TOTAL CA: CPT

## 2017-09-05 PROCEDURE — 73030 X-RAY EXAM OF SHOULDER: CPT

## 2017-09-05 PROCEDURE — 2060000000 HC ICU INTERMEDIATE R&B

## 2017-09-05 PROCEDURE — 36415 COLL VENOUS BLD VENIPUNCTURE: CPT

## 2017-09-05 RX ORDER — HYDROCODONE BITARTRATE AND ACETAMINOPHEN 5; 325 MG/1; MG/1
1 TABLET ORAL ONCE
Status: COMPLETED | OUTPATIENT
Start: 2017-09-05 | End: 2017-09-05

## 2017-09-05 RX ORDER — HYDROCODONE BITARTRATE AND ACETAMINOPHEN 5; 325 MG/1; MG/1
1 TABLET ORAL EVERY 6 HOURS PRN
Status: DISCONTINUED | OUTPATIENT
Start: 2017-09-05 | End: 2017-09-09 | Stop reason: HOSPADM

## 2017-09-05 RX ADMIN — AMITRIPTYLINE HYDROCHLORIDE 25 MG: 25 TABLET, FILM COATED ORAL at 21:25

## 2017-09-05 RX ADMIN — FUROSEMIDE 40 MG: 40 TABLET ORAL at 17:05

## 2017-09-05 RX ADMIN — HYDROCODONE BITARTRATE AND ACETAMINOPHEN 1 TABLET: 5; 325 TABLET ORAL at 10:16

## 2017-09-05 RX ADMIN — Medication 10 ML: at 21:26

## 2017-09-05 RX ADMIN — BUTALBITAL, ACETAMINOPHEN, AND CAFFEINE 1 TABLET: 50; 325; 40 TABLET ORAL at 08:41

## 2017-09-05 RX ADMIN — ISOSORBIDE MONONITRATE 30 MG: 30 TABLET ORAL at 08:35

## 2017-09-05 RX ADMIN — FAMOTIDINE 20 MG: 20 TABLET, FILM COATED ORAL at 21:25

## 2017-09-05 RX ADMIN — ASPIRIN 81 MG: 81 TABLET ORAL at 08:36

## 2017-09-05 RX ADMIN — HYDROCODONE BITARTRATE AND ACETAMINOPHEN 1 TABLET: 5; 325 TABLET ORAL at 00:11

## 2017-09-05 RX ADMIN — METFORMIN HYDROCHLORIDE 1000 MG: 500 TABLET ORAL at 08:35

## 2017-09-05 RX ADMIN — CARVEDILOL 12.5 MG: 12.5 TABLET, FILM COATED ORAL at 08:35

## 2017-09-05 RX ADMIN — ATORVASTATIN CALCIUM 80 MG: 80 TABLET, FILM COATED ORAL at 21:25

## 2017-09-05 RX ADMIN — CLOPIDOGREL 75 MG: 75 TABLET, FILM COATED ORAL at 08:45

## 2017-09-05 RX ADMIN — ENOXAPARIN SODIUM 40 MG: 40 INJECTION SUBCUTANEOUS at 08:36

## 2017-09-05 RX ADMIN — FAMOTIDINE 20 MG: 20 TABLET, FILM COATED ORAL at 08:36

## 2017-09-05 RX ADMIN — OXYBUTYNIN CHLORIDE 5 MG: 5 TABLET ORAL at 08:35

## 2017-09-05 RX ADMIN — METFORMIN HYDROCHLORIDE 1000 MG: 500 TABLET ORAL at 17:05

## 2017-09-05 RX ADMIN — HYDROCODONE BITARTRATE AND ACETAMINOPHEN 1 TABLET: 5; 325 TABLET ORAL at 16:36

## 2017-09-05 RX ADMIN — AMLODIPINE BESYLATE 5 MG: 5 TABLET ORAL at 21:25

## 2017-09-05 ASSESSMENT — PAIN SCALES - GENERAL
PAINLEVEL_OUTOF10: 8
PAINLEVEL_OUTOF10: 7
PAINLEVEL_OUTOF10: 5
PAINLEVEL_OUTOF10: 8
PAINLEVEL_OUTOF10: 7

## 2017-09-06 LAB
GLUCOSE BLD-MCNC: 108 MG/DL (ref 65–105)
GLUCOSE BLD-MCNC: 109 MG/DL (ref 65–105)
GLUCOSE BLD-MCNC: 82 MG/DL (ref 65–105)
GLUCOSE BLD-MCNC: 92 MG/DL (ref 65–105)
LV EF: 65 %
LVEF MODALITY: NORMAL

## 2017-09-06 PROCEDURE — 6370000000 HC RX 637 (ALT 250 FOR IP): Performed by: INTERNAL MEDICINE

## 2017-09-06 PROCEDURE — 6370000000 HC RX 637 (ALT 250 FOR IP): Performed by: STUDENT IN AN ORGANIZED HEALTH CARE EDUCATION/TRAINING PROGRAM

## 2017-09-06 PROCEDURE — 93970 EXTREMITY STUDY: CPT

## 2017-09-06 PROCEDURE — 94762 N-INVAS EAR/PLS OXIMTRY CONT: CPT

## 2017-09-06 PROCEDURE — 2580000003 HC RX 258: Performed by: INTERNAL MEDICINE

## 2017-09-06 PROCEDURE — 93306 TTE W/DOPPLER COMPLETE: CPT

## 2017-09-06 PROCEDURE — 6370000000 HC RX 637 (ALT 250 FOR IP): Performed by: NURSE PRACTITIONER

## 2017-09-06 PROCEDURE — 82947 ASSAY GLUCOSE BLOOD QUANT: CPT

## 2017-09-06 PROCEDURE — 6360000002 HC RX W HCPCS: Performed by: INTERNAL MEDICINE

## 2017-09-06 PROCEDURE — 2060000000 HC ICU INTERMEDIATE R&B

## 2017-09-06 RX ORDER — AMLODIPINE BESYLATE 10 MG/1
10 TABLET ORAL NIGHTLY
Status: DISCONTINUED | OUTPATIENT
Start: 2017-09-06 | End: 2017-09-09 | Stop reason: HOSPADM

## 2017-09-06 RX ORDER — DOCUSATE SODIUM 100 MG/1
100 CAPSULE, LIQUID FILLED ORAL 2 TIMES DAILY
Status: DISCONTINUED | OUTPATIENT
Start: 2017-09-06 | End: 2017-09-09 | Stop reason: HOSPADM

## 2017-09-06 RX ADMIN — HYDROCODONE BITARTRATE AND ACETAMINOPHEN 1 TABLET: 5; 325 TABLET ORAL at 23:30

## 2017-09-06 RX ADMIN — DOCUSATE SODIUM 100 MG: 100 CAPSULE ORAL at 17:00

## 2017-09-06 RX ADMIN — FAMOTIDINE 20 MG: 20 TABLET, FILM COATED ORAL at 21:37

## 2017-09-06 RX ADMIN — CARVEDILOL 12.5 MG: 12.5 TABLET, FILM COATED ORAL at 09:42

## 2017-09-06 RX ADMIN — FAMOTIDINE 20 MG: 20 TABLET, FILM COATED ORAL at 09:41

## 2017-09-06 RX ADMIN — ASPIRIN 81 MG: 81 TABLET ORAL at 09:41

## 2017-09-06 RX ADMIN — OXYBUTYNIN CHLORIDE 5 MG: 5 TABLET ORAL at 09:41

## 2017-09-06 RX ADMIN — ATORVASTATIN CALCIUM 80 MG: 80 TABLET, FILM COATED ORAL at 21:37

## 2017-09-06 RX ADMIN — ENOXAPARIN SODIUM 40 MG: 40 INJECTION SUBCUTANEOUS at 09:41

## 2017-09-06 RX ADMIN — METFORMIN HYDROCHLORIDE 1000 MG: 500 TABLET ORAL at 23:30

## 2017-09-06 RX ADMIN — AMITRIPTYLINE HYDROCHLORIDE 25 MG: 25 TABLET, FILM COATED ORAL at 21:37

## 2017-09-06 RX ADMIN — Medication 10 ML: at 21:37

## 2017-09-06 RX ADMIN — HYDROCODONE BITARTRATE AND ACETAMINOPHEN 1 TABLET: 5; 325 TABLET ORAL at 09:42

## 2017-09-06 RX ADMIN — HYDROCODONE BITARTRATE AND ACETAMINOPHEN 1 TABLET: 5; 325 TABLET ORAL at 00:17

## 2017-09-06 RX ADMIN — AMLODIPINE BESYLATE 10 MG: 10 TABLET ORAL at 21:36

## 2017-09-06 RX ADMIN — ISOSORBIDE MONONITRATE 30 MG: 30 TABLET ORAL at 09:41

## 2017-09-06 RX ADMIN — METFORMIN HYDROCHLORIDE 1000 MG: 500 TABLET ORAL at 09:41

## 2017-09-06 RX ADMIN — CLOPIDOGREL 75 MG: 75 TABLET, FILM COATED ORAL at 09:41

## 2017-09-06 RX ADMIN — ACETAMINOPHEN 650 MG: 325 TABLET ORAL at 18:04

## 2017-09-06 RX ADMIN — ACETAMINOPHEN 650 MG: 325 TABLET ORAL at 04:42

## 2017-09-06 RX ADMIN — Medication 10 ML: at 09:43

## 2017-09-06 RX ADMIN — FUROSEMIDE 40 MG: 40 TABLET ORAL at 09:41

## 2017-09-06 ASSESSMENT — PAIN SCALES - GENERAL
PAINLEVEL_OUTOF10: 6
PAINLEVEL_OUTOF10: 10
PAINLEVEL_OUTOF10: 7
PAINLEVEL_OUTOF10: 9
PAINLEVEL_OUTOF10: 8

## 2017-09-06 ASSESSMENT — PAIN DESCRIPTION - LOCATION: LOCATION: HEAD

## 2017-09-06 ASSESSMENT — PAIN DESCRIPTION - PAIN TYPE: TYPE: ACUTE PAIN

## 2017-09-06 ASSESSMENT — PAIN DESCRIPTION - FREQUENCY: FREQUENCY: CONTINUOUS

## 2017-09-06 ASSESSMENT — PAIN DESCRIPTION - DESCRIPTORS: DESCRIPTORS: HEADACHE

## 2017-09-06 ASSESSMENT — PAIN DESCRIPTION - ONSET: ONSET: PROGRESSIVE

## 2017-09-06 ASSESSMENT — PAIN DESCRIPTION - PROGRESSION: CLINICAL_PROGRESSION: GRADUALLY WORSENING

## 2017-09-07 PROBLEM — R51.9 CHRONIC DAILY HEADACHE: Status: ACTIVE | Noted: 2017-09-07

## 2017-09-07 LAB
GLUCOSE BLD-MCNC: 101 MG/DL (ref 65–105)
GLUCOSE BLD-MCNC: 122 MG/DL (ref 65–105)
GLUCOSE BLD-MCNC: 196 MG/DL (ref 65–105)
GLUCOSE BLD-MCNC: 92 MG/DL (ref 65–105)

## 2017-09-07 PROCEDURE — 6370000000 HC RX 637 (ALT 250 FOR IP): Performed by: NURSE PRACTITIONER

## 2017-09-07 PROCEDURE — 97535 SELF CARE MNGMENT TRAINING: CPT

## 2017-09-07 PROCEDURE — 6360000002 HC RX W HCPCS: Performed by: INTERNAL MEDICINE

## 2017-09-07 PROCEDURE — 6360000002 HC RX W HCPCS: Performed by: PSYCHIATRY & NEUROLOGY

## 2017-09-07 PROCEDURE — 6370000000 HC RX 637 (ALT 250 FOR IP): Performed by: STUDENT IN AN ORGANIZED HEALTH CARE EDUCATION/TRAINING PROGRAM

## 2017-09-07 PROCEDURE — 2060000000 HC ICU INTERMEDIATE R&B

## 2017-09-07 PROCEDURE — 6370000000 HC RX 637 (ALT 250 FOR IP): Performed by: INTERNAL MEDICINE

## 2017-09-07 PROCEDURE — 99222 1ST HOSP IP/OBS MODERATE 55: CPT | Performed by: PSYCHIATRY & NEUROLOGY

## 2017-09-07 PROCEDURE — 2580000003 HC RX 258: Performed by: INTERNAL MEDICINE

## 2017-09-07 PROCEDURE — 82947 ASSAY GLUCOSE BLOOD QUANT: CPT

## 2017-09-07 PROCEDURE — 97165 OT EVAL LOW COMPLEX 30 MIN: CPT

## 2017-09-07 PROCEDURE — G8988 SELF CARE GOAL STATUS: HCPCS

## 2017-09-07 PROCEDURE — G8987 SELF CARE CURRENT STATUS: HCPCS

## 2017-09-07 PROCEDURE — 94762 N-INVAS EAR/PLS OXIMTRY CONT: CPT

## 2017-09-07 RX ORDER — DEXTROSE MONOHYDRATE 25 G/50ML
12.5 INJECTION, SOLUTION INTRAVENOUS PRN
Status: DISCONTINUED | OUTPATIENT
Start: 2017-09-07 | End: 2017-09-09 | Stop reason: HOSPADM

## 2017-09-07 RX ORDER — METHYLPREDNISOLONE SODIUM SUCCINATE 125 MG/2ML
100 INJECTION, POWDER, LYOPHILIZED, FOR SOLUTION INTRAMUSCULAR; INTRAVENOUS EVERY 8 HOURS
Status: COMPLETED | OUTPATIENT
Start: 2017-09-07 | End: 2017-09-09

## 2017-09-07 RX ORDER — DEXTROSE MONOHYDRATE 50 MG/ML
100 INJECTION, SOLUTION INTRAVENOUS PRN
Status: DISCONTINUED | OUTPATIENT
Start: 2017-09-07 | End: 2017-09-09 | Stop reason: HOSPADM

## 2017-09-07 RX ORDER — NICOTINE POLACRILEX 4 MG
15 LOZENGE BUCCAL PRN
Status: DISCONTINUED | OUTPATIENT
Start: 2017-09-07 | End: 2017-09-09 | Stop reason: HOSPADM

## 2017-09-07 RX ADMIN — FUROSEMIDE 40 MG: 40 TABLET ORAL at 09:07

## 2017-09-07 RX ADMIN — HYDROCODONE BITARTRATE AND ACETAMINOPHEN 1 TABLET: 5; 325 TABLET ORAL at 05:37

## 2017-09-07 RX ADMIN — METFORMIN HYDROCHLORIDE 1000 MG: 500 TABLET ORAL at 17:44

## 2017-09-07 RX ADMIN — AMITRIPTYLINE HYDROCHLORIDE 25 MG: 25 TABLET, FILM COATED ORAL at 20:47

## 2017-09-07 RX ADMIN — DOCUSATE SODIUM 100 MG: 100 CAPSULE ORAL at 20:47

## 2017-09-07 RX ADMIN — AMLODIPINE BESYLATE 10 MG: 10 TABLET ORAL at 20:47

## 2017-09-07 RX ADMIN — ACETAMINOPHEN 650 MG: 325 TABLET ORAL at 11:32

## 2017-09-07 RX ADMIN — CLOPIDOGREL 75 MG: 75 TABLET, FILM COATED ORAL at 09:07

## 2017-09-07 RX ADMIN — METFORMIN HYDROCHLORIDE 1000 MG: 500 TABLET ORAL at 10:53

## 2017-09-07 RX ADMIN — CARVEDILOL 12.5 MG: 12.5 TABLET, FILM COATED ORAL at 09:07

## 2017-09-07 RX ADMIN — ISOSORBIDE MONONITRATE 30 MG: 30 TABLET ORAL at 09:07

## 2017-09-07 RX ADMIN — Medication 10 ML: at 20:48

## 2017-09-07 RX ADMIN — ATORVASTATIN CALCIUM 80 MG: 80 TABLET, FILM COATED ORAL at 20:47

## 2017-09-07 RX ADMIN — FAMOTIDINE 20 MG: 20 TABLET, FILM COATED ORAL at 20:47

## 2017-09-07 RX ADMIN — DOCUSATE SODIUM 100 MG: 100 CAPSULE ORAL at 09:07

## 2017-09-07 RX ADMIN — Medication 10 ML: at 09:08

## 2017-09-07 RX ADMIN — ASPIRIN 81 MG: 81 TABLET ORAL at 09:07

## 2017-09-07 RX ADMIN — OXYBUTYNIN CHLORIDE 5 MG: 5 TABLET ORAL at 09:07

## 2017-09-07 RX ADMIN — FAMOTIDINE 20 MG: 20 TABLET, FILM COATED ORAL at 09:07

## 2017-09-07 RX ADMIN — METHYLPREDNISOLONE SODIUM SUCCINATE 100 MG: 125 INJECTION, POWDER, FOR SOLUTION INTRAMUSCULAR; INTRAVENOUS at 22:48

## 2017-09-07 RX ADMIN — ENOXAPARIN SODIUM 40 MG: 40 INJECTION SUBCUTANEOUS at 09:07

## 2017-09-07 RX ADMIN — HYDROCODONE BITARTRATE AND ACETAMINOPHEN 1 TABLET: 5; 325 TABLET ORAL at 22:49

## 2017-09-07 ASSESSMENT — PAIN DESCRIPTION - PAIN TYPE: TYPE: ACUTE PAIN

## 2017-09-07 ASSESSMENT — PAIN DESCRIPTION - FREQUENCY: FREQUENCY: CONTINUOUS

## 2017-09-07 ASSESSMENT — PAIN DESCRIPTION - LOCATION: LOCATION: SHOULDER

## 2017-09-07 ASSESSMENT — PAIN DESCRIPTION - PROGRESSION
CLINICAL_PROGRESSION: GRADUALLY WORSENING

## 2017-09-07 ASSESSMENT — PAIN DESCRIPTION - DESCRIPTORS: DESCRIPTORS: ACHING

## 2017-09-07 ASSESSMENT — PAIN SCALES - GENERAL
PAINLEVEL_OUTOF10: 0
PAINLEVEL_OUTOF10: 6
PAINLEVEL_OUTOF10: 7
PAINLEVEL_OUTOF10: 7

## 2017-09-07 ASSESSMENT — PAIN DESCRIPTION - ONSET: ONSET: PROGRESSIVE

## 2017-09-07 ASSESSMENT — PAIN DESCRIPTION - ORIENTATION: ORIENTATION: LEFT

## 2017-09-08 ENCOUNTER — APPOINTMENT (OUTPATIENT)
Dept: CT IMAGING | Age: 67
DRG: 641 | End: 2017-09-08
Attending: INTERNAL MEDICINE
Payer: MEDICARE

## 2017-09-08 LAB
GLUCOSE BLD-MCNC: 153 MG/DL (ref 65–105)
GLUCOSE BLD-MCNC: 208 MG/DL (ref 65–105)
GLUCOSE BLD-MCNC: 208 MG/DL (ref 65–105)

## 2017-09-08 PROCEDURE — 82947 ASSAY GLUCOSE BLOOD QUANT: CPT

## 2017-09-08 PROCEDURE — 94762 N-INVAS EAR/PLS OXIMTRY CONT: CPT

## 2017-09-08 PROCEDURE — 97161 PT EVAL LOW COMPLEX 20 MIN: CPT

## 2017-09-08 PROCEDURE — 6370000000 HC RX 637 (ALT 250 FOR IP): Performed by: STUDENT IN AN ORGANIZED HEALTH CARE EDUCATION/TRAINING PROGRAM

## 2017-09-08 PROCEDURE — 6370000000 HC RX 637 (ALT 250 FOR IP): Performed by: INTERNAL MEDICINE

## 2017-09-08 PROCEDURE — G8978 MOBILITY CURRENT STATUS: HCPCS

## 2017-09-08 PROCEDURE — 93880 EXTRACRANIAL BILAT STUDY: CPT

## 2017-09-08 PROCEDURE — 2580000003 HC RX 258: Performed by: INTERNAL MEDICINE

## 2017-09-08 PROCEDURE — 6360000002 HC RX W HCPCS: Performed by: INTERNAL MEDICINE

## 2017-09-08 PROCEDURE — 70450 CT HEAD/BRAIN W/O DYE: CPT

## 2017-09-08 PROCEDURE — 6370000000 HC RX 637 (ALT 250 FOR IP): Performed by: NURSE PRACTITIONER

## 2017-09-08 PROCEDURE — 6370000000 HC RX 637 (ALT 250 FOR IP): Performed by: PSYCHIATRY & NEUROLOGY

## 2017-09-08 PROCEDURE — 97530 THERAPEUTIC ACTIVITIES: CPT

## 2017-09-08 PROCEDURE — 2060000000 HC ICU INTERMEDIATE R&B

## 2017-09-08 PROCEDURE — G8980 MOBILITY D/C STATUS: HCPCS

## 2017-09-08 PROCEDURE — 6360000002 HC RX W HCPCS: Performed by: PSYCHIATRY & NEUROLOGY

## 2017-09-08 PROCEDURE — G8979 MOBILITY GOAL STATUS: HCPCS

## 2017-09-08 RX ADMIN — INSULIN LISPRO 2 UNITS: 100 INJECTION, SOLUTION INTRAVENOUS; SUBCUTANEOUS at 08:39

## 2017-09-08 RX ADMIN — ENOXAPARIN SODIUM 40 MG: 40 INJECTION SUBCUTANEOUS at 08:47

## 2017-09-08 RX ADMIN — ISOSORBIDE MONONITRATE 30 MG: 30 TABLET ORAL at 08:44

## 2017-09-08 RX ADMIN — Medication 10 ML: at 08:56

## 2017-09-08 RX ADMIN — AMLODIPINE BESYLATE 10 MG: 10 TABLET ORAL at 21:44

## 2017-09-08 RX ADMIN — ACETAMINOPHEN 650 MG: 325 TABLET ORAL at 12:12

## 2017-09-08 RX ADMIN — INSULIN LISPRO 1 UNITS: 100 INJECTION, SOLUTION INTRAVENOUS; SUBCUTANEOUS at 12:12

## 2017-09-08 RX ADMIN — METHYLPREDNISOLONE SODIUM SUCCINATE 100 MG: 125 INJECTION, POWDER, FOR SOLUTION INTRAMUSCULAR; INTRAVENOUS at 06:51

## 2017-09-08 RX ADMIN — INSULIN LISPRO 2 UNITS: 100 INJECTION, SOLUTION INTRAVENOUS; SUBCUTANEOUS at 17:46

## 2017-09-08 RX ADMIN — Medication 10 ML: at 21:48

## 2017-09-08 RX ADMIN — CARVEDILOL 12.5 MG: 12.5 TABLET, FILM COATED ORAL at 08:46

## 2017-09-08 RX ADMIN — DOCUSATE SODIUM 100 MG: 100 CAPSULE ORAL at 21:48

## 2017-09-08 RX ADMIN — ATORVASTATIN CALCIUM 80 MG: 80 TABLET, FILM COATED ORAL at 21:48

## 2017-09-08 RX ADMIN — FAMOTIDINE 20 MG: 20 TABLET, FILM COATED ORAL at 21:48

## 2017-09-08 RX ADMIN — FAMOTIDINE 20 MG: 20 TABLET, FILM COATED ORAL at 08:47

## 2017-09-08 RX ADMIN — DOCUSATE SODIUM 100 MG: 100 CAPSULE ORAL at 08:44

## 2017-09-08 RX ADMIN — AMITRIPTYLINE HYDROCHLORIDE 25 MG: 25 TABLET, FILM COATED ORAL at 21:44

## 2017-09-08 RX ADMIN — CLOPIDOGREL 75 MG: 75 TABLET, FILM COATED ORAL at 08:47

## 2017-09-08 RX ADMIN — METHYLPREDNISOLONE SODIUM SUCCINATE 100 MG: 125 INJECTION, POWDER, FOR SOLUTION INTRAMUSCULAR; INTRAVENOUS at 12:13

## 2017-09-08 RX ADMIN — ACETAMINOPHEN 650 MG: 325 TABLET ORAL at 21:48

## 2017-09-08 RX ADMIN — METHYLPREDNISOLONE SODIUM SUCCINATE 100 MG: 125 INJECTION, POWDER, FOR SOLUTION INTRAMUSCULAR; INTRAVENOUS at 21:49

## 2017-09-08 RX ADMIN — OXYBUTYNIN CHLORIDE 5 MG: 5 TABLET ORAL at 08:47

## 2017-09-08 RX ADMIN — METFORMIN HYDROCHLORIDE 1000 MG: 500 TABLET ORAL at 08:47

## 2017-09-08 RX ADMIN — ASPIRIN 81 MG: 81 TABLET ORAL at 08:47

## 2017-09-08 RX ADMIN — FUROSEMIDE 40 MG: 40 TABLET ORAL at 08:45

## 2017-09-08 RX ADMIN — METFORMIN HYDROCHLORIDE 1000 MG: 500 TABLET ORAL at 17:46

## 2017-09-08 ASSESSMENT — PAIN SCALES - GENERAL
PAINLEVEL_OUTOF10: 2
PAINLEVEL_OUTOF10: 5

## 2017-09-09 VITALS
DIASTOLIC BLOOD PRESSURE: 69 MMHG | HEART RATE: 88 BPM | TEMPERATURE: 97.7 F | HEIGHT: 65 IN | SYSTOLIC BLOOD PRESSURE: 121 MMHG | WEIGHT: 278.2 LBS | OXYGEN SATURATION: 99 % | RESPIRATION RATE: 18 BRPM | BODY MASS INDEX: 46.35 KG/M2

## 2017-09-09 LAB
GLUCOSE BLD-MCNC: 165 MG/DL (ref 65–105)
GLUCOSE BLD-MCNC: 182 MG/DL (ref 65–105)
GLUCOSE BLD-MCNC: 211 MG/DL (ref 65–105)

## 2017-09-09 PROCEDURE — 99232 SBSQ HOSP IP/OBS MODERATE 35: CPT | Performed by: PSYCHIATRY & NEUROLOGY

## 2017-09-09 PROCEDURE — 6370000000 HC RX 637 (ALT 250 FOR IP): Performed by: STUDENT IN AN ORGANIZED HEALTH CARE EDUCATION/TRAINING PROGRAM

## 2017-09-09 PROCEDURE — 94762 N-INVAS EAR/PLS OXIMTRY CONT: CPT

## 2017-09-09 PROCEDURE — 6370000000 HC RX 637 (ALT 250 FOR IP): Performed by: PSYCHIATRY & NEUROLOGY

## 2017-09-09 PROCEDURE — 6370000000 HC RX 637 (ALT 250 FOR IP): Performed by: INTERNAL MEDICINE

## 2017-09-09 PROCEDURE — 6360000002 HC RX W HCPCS: Performed by: PSYCHIATRY & NEUROLOGY

## 2017-09-09 PROCEDURE — 2580000003 HC RX 258: Performed by: INTERNAL MEDICINE

## 2017-09-09 PROCEDURE — 6360000002 HC RX W HCPCS: Performed by: INTERNAL MEDICINE

## 2017-09-09 PROCEDURE — 82947 ASSAY GLUCOSE BLOOD QUANT: CPT

## 2017-09-09 RX ORDER — FUROSEMIDE 20 MG/1
20 TABLET ORAL DAILY
Qty: 60 TABLET | Refills: 3 | Status: SHIPPED | OUTPATIENT
Start: 2017-09-10 | End: 2018-06-21 | Stop reason: SDUPTHER

## 2017-09-09 RX ORDER — FUROSEMIDE 20 MG/1
20 TABLET ORAL DAILY
Status: DISCONTINUED | OUTPATIENT
Start: 2017-09-10 | End: 2017-09-09 | Stop reason: HOSPADM

## 2017-09-09 RX ORDER — CARVEDILOL 12.5 MG/1
12.5 TABLET ORAL DAILY
Qty: 60 TABLET | Refills: 3 | Status: SHIPPED | OUTPATIENT
Start: 2017-09-09 | End: 2018-01-04

## 2017-09-09 RX ORDER — BUTALBITAL, ACETAMINOPHEN AND CAFFEINE 50; 325; 40 MG/1; MG/1; MG/1
1 TABLET ORAL EVERY 4 HOURS PRN
Qty: 60 TABLET | Refills: 0 | Status: SHIPPED | OUTPATIENT
Start: 2017-09-09 | End: 2018-01-18 | Stop reason: ALTCHOICE

## 2017-09-09 RX ORDER — AMITRIPTYLINE HYDROCHLORIDE 50 MG/1
50 TABLET, FILM COATED ORAL NIGHTLY
Qty: 30 TABLET | Refills: 3 | Status: SHIPPED | OUTPATIENT
Start: 2017-09-09 | End: 2018-02-13 | Stop reason: SDUPTHER

## 2017-09-09 RX ORDER — AMITRIPTYLINE HYDROCHLORIDE 50 MG/1
50 TABLET, FILM COATED ORAL NIGHTLY
Qty: 30 TABLET | Refills: 3 | Status: SHIPPED | OUTPATIENT
Start: 2017-09-09 | End: 2017-09-09

## 2017-09-09 RX ORDER — BUTALBITAL, ACETAMINOPHEN AND CAFFEINE 50; 325; 40 MG/1; MG/1; MG/1
1 TABLET ORAL EVERY 4 HOURS PRN
Qty: 180 TABLET | Refills: 3 | Status: SHIPPED | OUTPATIENT
Start: 2017-09-09 | End: 2017-09-09

## 2017-09-09 RX ORDER — AMITRIPTYLINE HYDROCHLORIDE 50 MG/1
50 TABLET, FILM COATED ORAL NIGHTLY
Status: DISCONTINUED | OUTPATIENT
Start: 2017-09-09 | End: 2017-09-09 | Stop reason: HOSPADM

## 2017-09-09 RX ADMIN — INSULIN LISPRO 2 UNITS: 100 INJECTION, SOLUTION INTRAVENOUS; SUBCUTANEOUS at 16:39

## 2017-09-09 RX ADMIN — FAMOTIDINE 20 MG: 20 TABLET, FILM COATED ORAL at 08:15

## 2017-09-09 RX ADMIN — METHYLPREDNISOLONE SODIUM SUCCINATE 100 MG: 125 INJECTION, POWDER, FOR SOLUTION INTRAMUSCULAR; INTRAVENOUS at 13:59

## 2017-09-09 RX ADMIN — INSULIN LISPRO 1 UNITS: 100 INJECTION, SOLUTION INTRAVENOUS; SUBCUTANEOUS at 11:43

## 2017-09-09 RX ADMIN — METFORMIN HYDROCHLORIDE 1000 MG: 500 TABLET ORAL at 16:38

## 2017-09-09 RX ADMIN — FUROSEMIDE 40 MG: 40 TABLET ORAL at 08:14

## 2017-09-09 RX ADMIN — METFORMIN HYDROCHLORIDE 1000 MG: 500 TABLET ORAL at 08:16

## 2017-09-09 RX ADMIN — CLOPIDOGREL 75 MG: 75 TABLET, FILM COATED ORAL at 08:15

## 2017-09-09 RX ADMIN — ISOSORBIDE MONONITRATE 30 MG: 30 TABLET ORAL at 08:14

## 2017-09-09 RX ADMIN — INSULIN LISPRO 1 UNITS: 100 INJECTION, SOLUTION INTRAVENOUS; SUBCUTANEOUS at 09:22

## 2017-09-09 RX ADMIN — ASPIRIN 81 MG: 81 TABLET ORAL at 08:16

## 2017-09-09 RX ADMIN — METHYLPREDNISOLONE SODIUM SUCCINATE 100 MG: 125 INJECTION, POWDER, FOR SOLUTION INTRAMUSCULAR; INTRAVENOUS at 07:08

## 2017-09-09 RX ADMIN — Medication 10 ML: at 08:21

## 2017-09-09 RX ADMIN — DOCUSATE SODIUM 100 MG: 100 CAPSULE ORAL at 08:15

## 2017-09-09 RX ADMIN — CARVEDILOL 12.5 MG: 12.5 TABLET, FILM COATED ORAL at 08:15

## 2017-09-09 RX ADMIN — OXYBUTYNIN CHLORIDE 5 MG: 5 TABLET ORAL at 08:14

## 2017-09-09 RX ADMIN — ENOXAPARIN SODIUM 40 MG: 40 INJECTION SUBCUTANEOUS at 08:19

## 2017-09-09 RX ADMIN — HYDROCODONE BITARTRATE AND ACETAMINOPHEN 1 TABLET: 5; 325 TABLET ORAL at 04:25

## 2017-09-09 ASSESSMENT — PAIN DESCRIPTION - FREQUENCY: FREQUENCY: INTERMITTENT

## 2017-09-09 ASSESSMENT — PAIN DESCRIPTION - DESCRIPTORS: DESCRIPTORS: HEADACHE

## 2017-09-09 ASSESSMENT — PAIN DESCRIPTION - LOCATION: LOCATION: HEAD

## 2017-09-09 ASSESSMENT — PAIN DESCRIPTION - PAIN TYPE: TYPE: ACUTE PAIN

## 2017-09-09 ASSESSMENT — PAIN DESCRIPTION - PROGRESSION: CLINICAL_PROGRESSION: GRADUALLY IMPROVING

## 2017-09-09 ASSESSMENT — PAIN SCALES - GENERAL
PAINLEVEL_OUTOF10: 3
PAINLEVEL_OUTOF10: 0
PAINLEVEL_OUTOF10: 0
PAINLEVEL_OUTOF10: 6

## 2017-09-09 ASSESSMENT — PAIN DESCRIPTION - ONSET: ONSET: GRADUAL

## 2017-09-09 ASSESSMENT — PAIN DESCRIPTION - ORIENTATION: ORIENTATION: OTHER (COMMENT)

## 2017-09-10 ENCOUNTER — CARE COORDINATION (OUTPATIENT)
Dept: CASE MANAGEMENT | Age: 67
End: 2017-09-10

## 2017-09-11 ENCOUNTER — TELEPHONE (OUTPATIENT)
Dept: PHARMACY | Facility: CLINIC | Age: 67
End: 2017-09-11

## 2017-09-11 RX ORDER — DOCUSATE SODIUM 100 MG/1
100 CAPSULE, LIQUID FILLED ORAL DAILY PRN
Status: ON HOLD | COMMUNITY
End: 2018-10-28 | Stop reason: ALTCHOICE

## 2017-09-19 ENCOUNTER — CARE COORDINATION (OUTPATIENT)
Dept: CASE MANAGEMENT | Age: 67
End: 2017-09-19

## 2017-09-19 DIAGNOSIS — G47.33 OBSTRUCTIVE SLEEP APNEA SYNDROME: Primary | ICD-10-CM

## 2017-09-22 ENCOUNTER — CARE COORDINATION (OUTPATIENT)
Dept: CASE MANAGEMENT | Age: 67
End: 2017-09-22

## 2017-09-22 ENCOUNTER — HOSPITAL ENCOUNTER (OUTPATIENT)
Dept: SLEEP CENTER | Age: 67
Discharge: HOME | End: 2017-09-22
Payer: MEDICARE

## 2017-09-22 VITALS — RESPIRATION RATE: 20 BRPM | HEART RATE: 89 BPM | HEIGHT: 65 IN | BODY MASS INDEX: 46.82 KG/M2 | WEIGHT: 281 LBS

## 2017-09-22 DIAGNOSIS — G47.33 OBSTRUCTIVE SLEEP APNEA SYNDROME: ICD-10-CM

## 2017-09-22 PROCEDURE — 95810 POLYSOM 6/> YRS 4/> PARAM: CPT

## 2017-09-22 ASSESSMENT — SLEEP AND FATIGUE QUESTIONNAIRES
HOW LIKELY ARE YOU TO NOD OFF OR FALL ASLEEP IN A CAR, WHILE STOPPED FOR A FEW MINUTES IN TRAFFIC: 0
NECK CIRCUMFERENCE (INCHES): 41.5
HOW LIKELY ARE YOU TO NOD OFF OR FALL ASLEEP WHILE SITTING QUIETLY AFTER LUNCH WITHOUT ALCOHOL: 1
HOW LIKELY ARE YOU TO NOD OFF OR FALL ASLEEP WHILE LYING DOWN TO REST IN THE AFTERNOON WHEN CIRCUMSTANCES PERMIT: 0
ESS TOTAL SCORE: 5
HOW LIKELY ARE YOU TO NOD OFF OR FALL ASLEEP WHILE SITTING AND READING: 1
HOW LIKELY ARE YOU TO NOD OFF OR FALL ASLEEP WHILE WATCHING TV: 2
HOW LIKELY ARE YOU TO NOD OFF OR FALL ASLEEP WHILE SITTING AND TALKING TO SOMEONE: 1
HOW LIKELY ARE YOU TO NOD OFF OR FALL ASLEEP WHILE SITTING INACTIVE IN A PUBLIC PLACE: 0
HOW LIKELY ARE YOU TO NOD OFF OR FALL ASLEEP WHEN YOU ARE A PASSENGER IN A CAR FOR AN HOUR WITHOUT A BREAK: 0

## 2017-09-25 ENCOUNTER — TELEPHONE (OUTPATIENT)
Dept: NEUROLOGY | Age: 67
End: 2017-09-25

## 2017-09-25 DIAGNOSIS — G47.33 OBSTRUCTIVE SLEEP APNEA (ADULT) (PEDIATRIC): Primary | ICD-10-CM

## 2017-09-26 ENCOUNTER — OFFICE VISIT (OUTPATIENT)
Dept: INTERNAL MEDICINE | Age: 67
End: 2017-09-26
Payer: MEDICARE

## 2017-09-26 VITALS
HEART RATE: 84 BPM | WEIGHT: 290 LBS | BODY MASS INDEX: 48.32 KG/M2 | DIASTOLIC BLOOD PRESSURE: 85 MMHG | SYSTOLIC BLOOD PRESSURE: 124 MMHG | OXYGEN SATURATION: 98 % | HEIGHT: 65 IN

## 2017-09-26 DIAGNOSIS — Z09 HOSPITAL DISCHARGE FOLLOW-UP: Primary | ICD-10-CM

## 2017-09-26 DIAGNOSIS — Z12.31 ENCOUNTER FOR SCREENING MAMMOGRAM FOR BREAST CANCER: ICD-10-CM

## 2017-09-26 DIAGNOSIS — R60.9 EDEMA, UNSPECIFIED TYPE: ICD-10-CM

## 2017-09-26 DIAGNOSIS — E11.42 TYPE 2 DIABETES MELLITUS WITH DIABETIC POLYNEUROPATHY, WITHOUT LONG-TERM CURRENT USE OF INSULIN (HCC): ICD-10-CM

## 2017-09-26 DIAGNOSIS — M75.02 ADHESIVE CAPSULITIS OF LEFT SHOULDER: ICD-10-CM

## 2017-09-26 LAB — HBA1C MFR BLD: 6.6 %

## 2017-09-26 PROCEDURE — G8417 CALC BMI ABV UP PARAM F/U: HCPCS | Performed by: FAMILY MEDICINE

## 2017-09-26 PROCEDURE — G8399 PT W/DXA RESULTS DOCUMENT: HCPCS | Performed by: FAMILY MEDICINE

## 2017-09-26 PROCEDURE — 1111F DSCHRG MED/CURRENT MED MERGE: CPT | Performed by: FAMILY MEDICINE

## 2017-09-26 PROCEDURE — 83036 HEMOGLOBIN GLYCOSYLATED A1C: CPT | Performed by: FAMILY MEDICINE

## 2017-09-26 PROCEDURE — 4040F PNEUMOC VAC/ADMIN/RCVD: CPT | Performed by: FAMILY MEDICINE

## 2017-09-26 PROCEDURE — 3046F HEMOGLOBIN A1C LEVEL >9.0%: CPT | Performed by: FAMILY MEDICINE

## 2017-09-26 PROCEDURE — 1123F ACP DISCUSS/DSCN MKR DOCD: CPT | Performed by: FAMILY MEDICINE

## 2017-09-26 PROCEDURE — 3014F SCREEN MAMMO DOC REV: CPT | Performed by: FAMILY MEDICINE

## 2017-09-26 PROCEDURE — G8598 ASA/ANTIPLAT THER USED: HCPCS | Performed by: FAMILY MEDICINE

## 2017-09-26 PROCEDURE — G8427 DOCREV CUR MEDS BY ELIG CLIN: HCPCS | Performed by: FAMILY MEDICINE

## 2017-09-26 PROCEDURE — 3017F COLORECTAL CA SCREEN DOC REV: CPT | Performed by: FAMILY MEDICINE

## 2017-09-26 PROCEDURE — 1036F TOBACCO NON-USER: CPT | Performed by: FAMILY MEDICINE

## 2017-09-26 PROCEDURE — 99214 OFFICE O/P EST MOD 30 MIN: CPT | Performed by: FAMILY MEDICINE

## 2017-09-26 PROCEDURE — 1090F PRES/ABSN URINE INCON ASSESS: CPT | Performed by: FAMILY MEDICINE

## 2017-09-26 RX ORDER — ACETAMINOPHEN AND CODEINE PHOSPHATE 300; 30 MG/1; MG/1
2 TABLET ORAL 2 TIMES DAILY PRN
Qty: 60 TABLET | Refills: 0 | Status: SHIPPED | OUTPATIENT
Start: 2017-09-26 | End: 2018-01-04 | Stop reason: ALTCHOICE

## 2017-09-26 ASSESSMENT — ENCOUNTER SYMPTOMS
SHORTNESS OF BREATH: 0
COUGH: 0
VOMITING: 0
NAUSEA: 0

## 2017-09-27 ENCOUNTER — HOSPITAL ENCOUNTER (OUTPATIENT)
Dept: SLEEP CENTER | Age: 67
Discharge: HOME | End: 2017-09-27
Payer: MEDICARE

## 2017-09-27 VITALS — HEIGHT: 65 IN | BODY MASS INDEX: 46.82 KG/M2 | WEIGHT: 281 LBS

## 2017-09-27 DIAGNOSIS — G47.33 OBSTRUCTIVE SLEEP APNEA (ADULT) (PEDIATRIC): ICD-10-CM

## 2017-09-27 PROCEDURE — 95811 POLYSOM 6/>YRS CPAP 4/> PARM: CPT

## 2017-10-26 ENCOUNTER — TELEPHONE (OUTPATIENT)
Dept: NEUROLOGY | Age: 67
End: 2017-10-26

## 2018-01-04 ENCOUNTER — OFFICE VISIT (OUTPATIENT)
Dept: NEUROLOGY | Age: 68
End: 2018-01-04
Payer: MEDICARE

## 2018-01-04 VITALS
DIASTOLIC BLOOD PRESSURE: 92 MMHG | WEIGHT: 291.4 LBS | HEART RATE: 93 BPM | SYSTOLIC BLOOD PRESSURE: 162 MMHG | HEIGHT: 65 IN | BODY MASS INDEX: 48.55 KG/M2

## 2018-01-04 DIAGNOSIS — E08.42 DIABETIC POLYNEUROPATHY ASSOCIATED WITH DIABETES MELLITUS DUE TO UNDERLYING CONDITION (HCC): ICD-10-CM

## 2018-01-04 DIAGNOSIS — G47.33 OBSTRUCTIVE SLEEP APNEA SYNDROME: Primary | ICD-10-CM

## 2018-01-04 DIAGNOSIS — E66.01 MORBID OBESITY WITH BMI OF 45.0-49.9, ADULT (HCC): ICD-10-CM

## 2018-01-04 DIAGNOSIS — R51.9 CHRONIC DAILY HEADACHE: ICD-10-CM

## 2018-01-04 PROCEDURE — 1036F TOBACCO NON-USER: CPT | Performed by: PSYCHIATRY & NEUROLOGY

## 2018-01-04 PROCEDURE — G8484 FLU IMMUNIZE NO ADMIN: HCPCS | Performed by: PSYCHIATRY & NEUROLOGY

## 2018-01-04 PROCEDURE — 1123F ACP DISCUSS/DSCN MKR DOCD: CPT | Performed by: PSYCHIATRY & NEUROLOGY

## 2018-01-04 PROCEDURE — 3014F SCREEN MAMMO DOC REV: CPT | Performed by: PSYCHIATRY & NEUROLOGY

## 2018-01-04 PROCEDURE — G8599 NO ASA/ANTIPLAT THER USE RNG: HCPCS | Performed by: PSYCHIATRY & NEUROLOGY

## 2018-01-04 PROCEDURE — G8417 CALC BMI ABV UP PARAM F/U: HCPCS | Performed by: PSYCHIATRY & NEUROLOGY

## 2018-01-04 PROCEDURE — 4040F PNEUMOC VAC/ADMIN/RCVD: CPT | Performed by: PSYCHIATRY & NEUROLOGY

## 2018-01-04 PROCEDURE — 1090F PRES/ABSN URINE INCON ASSESS: CPT | Performed by: PSYCHIATRY & NEUROLOGY

## 2018-01-04 PROCEDURE — 3017F COLORECTAL CA SCREEN DOC REV: CPT | Performed by: PSYCHIATRY & NEUROLOGY

## 2018-01-04 PROCEDURE — G8427 DOCREV CUR MEDS BY ELIG CLIN: HCPCS | Performed by: PSYCHIATRY & NEUROLOGY

## 2018-01-04 PROCEDURE — G8399 PT W/DXA RESULTS DOCUMENT: HCPCS | Performed by: PSYCHIATRY & NEUROLOGY

## 2018-01-04 PROCEDURE — 99214 OFFICE O/P EST MOD 30 MIN: CPT | Performed by: PSYCHIATRY & NEUROLOGY

## 2018-01-04 RX ORDER — AMITRIPTYLINE HYDROCHLORIDE 25 MG/1
TABLET, FILM COATED ORAL
Qty: 30 TABLET | Refills: 3 | Status: SHIPPED | OUTPATIENT
Start: 2018-01-04 | End: 2018-01-18 | Stop reason: SDUPTHER

## 2018-01-04 ASSESSMENT — ENCOUNTER SYMPTOMS
BACK PAIN: 1
SHORTNESS OF BREATH: 1
EYES NEGATIVE: 1
ALLERGIC/IMMUNOLOGIC NEGATIVE: 1
GASTROINTESTINAL NEGATIVE: 1

## 2018-01-04 NOTE — PROGRESS NOTES
Use of cane as ambulatory aid     Wears contact lenses     LEFT EYE ONLY       Past Surgical History:   Procedure Laterality Date    CARDIAC SURGERY      2 stents  placed 2015    CORONARY ANGIOPLASTY WITH STENT PLACEMENT      HYSTERECTOMY  30 years     JOINT REPLACEMENT Left 03/2016    bilat. knees    KNEE ARTHROSCOPY Left     TOTAL KNEE ARTHROPLASTY Right 07/21/2016       Family History   Problem Relation Age of Onset    Stroke Mother     Heart Disease Mother     Heart Disease Father     Other Sister      migraines    Heart Disease Brother        Social History     Social History    Marital status: Single     Spouse name: N/A    Number of children: N/A    Years of education: N/A     Social History Main Topics    Smoking status: Never Smoker    Smokeless tobacco: Never Used    Alcohol use No    Drug use: No    Sexual activity: Not Asked     Other Topics Concern    None     Social History Narrative    None       Current Outpatient Prescriptions   Medication Sig Dispense Refill    metFORMIN (GLUCOPHAGE) 500 MG tablet Take 1,000 mg by mouth 2 times daily (with meals)      docusate sodium (COLACE) 100 MG capsule Take 100 mg by mouth daily as needed for Constipation      magnesium hydroxide (MILK OF MAGNESIA) 400 MG/5ML suspension Take 5 mLs by mouth daily as needed for Constipation      amitriptyline (ELAVIL) 50 MG tablet Take 1 tablet by mouth nightly 30 tablet 3    furosemide (LASIX) 20 MG tablet Take 1 tablet by mouth daily 60 tablet 3    butalbital-acetaminophen-caffeine (FIORICET, ESGIC) -40 MG per tablet Take 1 tablet by mouth every 4 hours as needed for Headaches or Migraine 60 tablet 0    oxybutynin (DITROPAN) 5 MG tablet TAKE 1 TABLET EVERY DAY 90 tablet 5    atorvastatin (LIPITOR) 80 MG tablet Take 1 tablet by mouth nightly 30 tablet 3    clopidogrel (PLAVIX) 75 MG tablet Take 1 tablet by mouth daily 30 tablet 3    glucose blood VI test strips (ONETOUCH VERIO) strip 1 each by In Vitro route daily Daily. She has a OneTouch Verio machine. Needs compatible test strips. 50 each 3    aspirin 81 MG tablet Take 1 tablet by mouth daily 30 tablet 3    losartan (COZAAR) 50 MG tablet take 1 tablet by mouth once daily 30 tablet 3     No current facility-administered medications for this visit. Allergies   Allergen Reactions    Lisinopril Itching     Inflammation, burning, itching    Pcn [Penicillins] Hives       Review of Systems   Constitutional: Positive for fatigue. HENT: Negative. Eyes: Negative. Respiratory: Positive for shortness of breath. Cardiovascular: Positive for leg swelling. Gastrointestinal: Negative. Endocrine: Negative. Genitourinary: Negative. Musculoskeletal: Positive for back pain and joint swelling. Skin: Negative. Allergic/Immunologic: Negative. Neurological: Positive for headaches. Hematological: Negative. Psychiatric/Behavioral: Negative. Objective:   Physical Exam  Vitals:    01/04/18 1612   BP: (!) 162/92   Pulse: 93     weight: 291 lb 6.4 oz (132.2 kg)    Neurological Examination  Constitutional .General exam well groomed   Ears /Nose/Throat: external ear . Normal exam  Neck and thyroid . Normal size  Cardiovascular: Auscultation of heart with regular rate and rhythm   Musculoskeletal. Muscle bulk and tone normal                                                           Muscle strength 5/5 strength throughout                                                                                 No dysmetria or dysdiadokinesis  No tremor   Normal fine motor  Orientation Alert and oriented x 3   Language process and speech normal . No aphasia   Cranial nerve 2 normal acuety and visual fields  Cranial nerve 3, 4 and 6 . Extraocular muscles are intact . Pupils are equal and reactive   Cranial nerve 5 . Intact corneal reflex. Normal facial sensation  Cranial nerve 7 normal exam   Cranial nerve 8.  Grossly intact hearing   Cranial nerve 9 and 10. Symmetric palate elevation   Cranial nerve 11 , 5 out of 5 strength   Cranial Nerve 12 midline tongue . No atrophy  Sensation .  pinprick and light touch in stocking distribution   Deep Tendon Reflexes absent ankle jerks   Plantar response flexor bilaterally     Assessment:      1. Obstructive sleep apnea syndrome    2.  Chronic daily headache    She is to use nasal CPAP for as long as possible during the night to get new nasal CPAP headgear to stay on current medication administration         Plan:      As above

## 2018-01-04 NOTE — LETTER
[facility] Neurology Specialist  [place] [address] [address] [address]  Phone: [phone]  Fax: [phone]    Da Abarca MD        January 16, 2018       Patient: Senthil Mckeon   MR Number: [id number]   YOB: 1950   Date of Visit: 1/4/2018       Dear Dr. Lucien Marrero:    Thank you for the request for consultation for Skippy Handsome. Below are the relevant portions of my assessment and plan of care. Subjective:      Patient ID: Senthil Mckeon is a 75 y.o. female. HPI  Active problem she was seen at [facility] in September for chronic daily headache pulsed with IV solumedrol  . Syncope . There is sleep apnea along with nasal CPAP . The condition she had sleep study showing sleep apnea having diffculty keeping nasal CPAP on having mask leakage having trouble getting a good seal . She is going to be at 10 to 11PM falling asleep within 15 minutes sleeping to 8 AM . She is able to keep mask on for 2 hours but even with 2 hours she will feel better being more rested . She will have headache twice per week over her left eye of pressure character attenuated with fioricet grade 2 over 10 . She denies focal motor ,sensory or bulbar complaint . There is obesity BMI 46. Significant medications elavil 50 mg po qhs , fioricet PRN , plavix 75 mg po qd , aspirin 81 mg po qd. Testing MRI of Head with 7 mm left frontal meningioma , April 2017. Head CT with small left frontal extraxial mass concordant with meningioma . Carotid ultrasound 1-15 % stenosis bilaterally . Cardiac 2 D echo hyperdynamic LVF EF > 65 %. Polysomnogram apnea hypopnea index 53 episodes per hour with oxyhemoglobin desaturation to 64 % , September 2017 .  CPAP 16 cm H20      Past Medical History:   Diagnosis Date    HOSSEIN (acute kidney injury) ([address] 3/3/2017    Arthritis     shoulders    Chronic diastolic congestive heart failure ([address] 3/3/2017    Chronic kidney disease

## 2018-01-08 ENCOUNTER — TELEPHONE (OUTPATIENT)
Dept: NEUROLOGY | Age: 68
End: 2018-01-08

## 2018-01-08 NOTE — TELEPHONE ENCOUNTER
Tanya Alatorre saw Dr Melissa Gómez last week and he made a pressure change on her CPAP. She talked to [address] and they would like us to fax the order change to them at [phone] and they told her they can then make adjustment.

## 2018-01-09 NOTE — TELEPHONE ENCOUNTER
Order for pressure change was actually written as a Rx. I was able to print this and fax this morning. Patient was asked to call if there were any problems.

## 2018-01-18 ENCOUNTER — OFFICE VISIT (OUTPATIENT)
Dept: INTERNAL MEDICINE | Age: 68
End: 2018-01-18
Payer: MEDICARE

## 2018-01-18 VITALS
DIASTOLIC BLOOD PRESSURE: 74 MMHG | RESPIRATION RATE: 12 BRPM | HEIGHT: 65 IN | OXYGEN SATURATION: 97 % | HEART RATE: 93 BPM | SYSTOLIC BLOOD PRESSURE: 122 MMHG | WEIGHT: 286.6 LBS | BODY MASS INDEX: 47.75 KG/M2

## 2018-01-18 DIAGNOSIS — I50.42 HEART FAILURE, SYSTOLIC AND DIASTOLIC, CHRONIC (HCC): ICD-10-CM

## 2018-01-18 DIAGNOSIS — E11.42 TYPE 2 DIABETES MELLITUS WITH DIABETIC POLYNEUROPATHY, WITHOUT LONG-TERM CURRENT USE OF INSULIN (HCC): Primary | ICD-10-CM

## 2018-01-18 DIAGNOSIS — Z23 NEED FOR PROPHYLACTIC VACCINATION AGAINST STREPTOCOCCUS PNEUMONIAE (PNEUMOCOCCUS): ICD-10-CM

## 2018-01-18 LAB — GLUCOSE BLD-MCNC: 143 MG/DL

## 2018-01-18 PROCEDURE — G0009 ADMIN PNEUMOCOCCAL VACCINE: HCPCS | Performed by: INTERNAL MEDICINE

## 2018-01-18 PROCEDURE — G8484 FLU IMMUNIZE NO ADMIN: HCPCS | Performed by: INTERNAL MEDICINE

## 2018-01-18 PROCEDURE — 83036 HEMOGLOBIN GLYCOSYLATED A1C: CPT | Performed by: INTERNAL MEDICINE

## 2018-01-18 PROCEDURE — G8399 PT W/DXA RESULTS DOCUMENT: HCPCS | Performed by: INTERNAL MEDICINE

## 2018-01-18 PROCEDURE — 4040F PNEUMOC VAC/ADMIN/RCVD: CPT | Performed by: INTERNAL MEDICINE

## 2018-01-18 PROCEDURE — 90670 PCV13 VACCINE IM: CPT | Performed by: INTERNAL MEDICINE

## 2018-01-18 PROCEDURE — 1123F ACP DISCUSS/DSCN MKR DOCD: CPT | Performed by: INTERNAL MEDICINE

## 2018-01-18 PROCEDURE — 3046F HEMOGLOBIN A1C LEVEL >9.0%: CPT | Performed by: INTERNAL MEDICINE

## 2018-01-18 PROCEDURE — 3014F SCREEN MAMMO DOC REV: CPT | Performed by: INTERNAL MEDICINE

## 2018-01-18 PROCEDURE — G8427 DOCREV CUR MEDS BY ELIG CLIN: HCPCS | Performed by: INTERNAL MEDICINE

## 2018-01-18 PROCEDURE — G8417 CALC BMI ABV UP PARAM F/U: HCPCS | Performed by: INTERNAL MEDICINE

## 2018-01-18 PROCEDURE — 82962 GLUCOSE BLOOD TEST: CPT | Performed by: INTERNAL MEDICINE

## 2018-01-18 PROCEDURE — 99214 OFFICE O/P EST MOD 30 MIN: CPT | Performed by: INTERNAL MEDICINE

## 2018-01-18 PROCEDURE — 1090F PRES/ABSN URINE INCON ASSESS: CPT | Performed by: INTERNAL MEDICINE

## 2018-01-18 PROCEDURE — 1036F TOBACCO NON-USER: CPT | Performed by: INTERNAL MEDICINE

## 2018-01-18 PROCEDURE — G8510 SCR DEP NEG, NO PLAN REQD: HCPCS | Performed by: INTERNAL MEDICINE

## 2018-01-18 PROCEDURE — 3017F COLORECTAL CA SCREEN DOC REV: CPT | Performed by: INTERNAL MEDICINE

## 2018-01-18 PROCEDURE — G8599 NO ASA/ANTIPLAT THER USE RNG: HCPCS | Performed by: INTERNAL MEDICINE

## 2018-01-18 ASSESSMENT — PATIENT HEALTH QUESTIONNAIRE - PHQ9
SUM OF ALL RESPONSES TO PHQ9 QUESTIONS 1 & 2: 0
1. LITTLE INTEREST OR PLEASURE IN DOING THINGS: 0
2. FEELING DOWN, DEPRESSED OR HOPELESS: 0
SUM OF ALL RESPONSES TO PHQ QUESTIONS 1-9: 0

## 2018-01-18 ASSESSMENT — ENCOUNTER SYMPTOMS
BACK PAIN: 1
NAUSEA: 1
SHORTNESS OF BREATH: 1
DIARRHEA: 1
ALLERGIC/IMMUNOLOGIC NEGATIVE: 1
VOMITING: 1

## 2018-01-18 NOTE — PROGRESS NOTES
entry  Heart - normal rate, regular rhythm, normal S1, S2, no murmurs, rubs, clicks or gallops  Abdomen - soft, nontender, nondistended, no masses or organomegaly  Back exam - full range of motion, no tenderness, palpable spasm or pain on motion  Neurological - alert, oriented, normal speech, no focal findings or movement disorder noted  Musculoskeletal - no joint tenderness, deformity or swelling  Extremities - peripheral pulses normal, no pedal edema, no clubbing or cyanosis    LABORATORY FINDINGS:    CBC:   Lab Results   Component Value Date    WBC 7.1 09/04/2017    HGB 11.8 09/04/2017     09/04/2017     BMP:    Lab Results   Component Value Date     09/05/2017    K 4.7 09/05/2017     09/05/2017    CO2 24 09/05/2017    BUN 23 09/05/2017    CREATININE 0.79 09/05/2017    GLUCOSE 143 01/18/2018     Hemoglobin A1C:   Lab Results   Component Value Date    LABA1C 6.6 09/26/2017     Microalbumin Urine:   Lab Results   Component Value Date    MICROALBUR 16 07/20/2017     Lipid profile:   Lab Results   Component Value Date    CHOL 122 03/03/2017    TRIG 116 03/03/2017    HDL 44 03/03/2017     Thyroid functions:   Lab Results   Component Value Date    TSH 0.89 03/02/2017      Hepatic functions:   Lab Results   Component Value Date    ALT 15 07/06/2016    AST 17 03/06/2015    PROT 8.1 03/06/2015    BILITOT 0.31 03/06/2015    BILIDIR 0.10 09/10/2012    LABALBU 4.6 03/06/2015     Urine Analysis: No results found for: [address] [place]:      Health Maintenance Due   Topic Date Due    Diabetic foot exam  03/16/2016    Breast cancer screen  09/17/2017       ASSESSMENT AND PLAN:      1. Type 2 diabetes mellitus with diabetic polyneuropathy, without long-term current use of insulin (HCC)  POCT glycosylated hemoglobin (Hb A1C)- 6.3 With continue patient on metformin thousand milligrams twice a day and recheck in 6 months     POCT Glucose   2.  Heart failure, systolic and diastolic, chronic ([address]  , Stable. Discontinued Coreg and will continue her Cozaar and Lasix    3. Need for prophylactic vaccination against Streptococcus pneumoniae (pneumococcus)  Pneumococcal conjugate vaccine 13-valent IM (PREVNAR 13)    NC IMMUNIZ ADMIN,1 SINGLE/COMB VAC/TOXOID     rtc in 6 months    FOLLOW UP:   1. Freya Candelaria received counseling on the following healthy behaviors: nutrition and exercise    2. Reviewed prior labs and health maintenance. 3.  Discussed use, benefit, and side effects of prescribed medications. Barriers to medication compliance addressed. All patient questions answered. Pt voiced understanding. 4.  Continue current medications, diet and exercise. No orders of the defined types were placed in this encounter. Completed Refills               Requested Prescriptions      No prescriptions requested or ordered in this encounter       5. Patient given educational materials - see patient instructions    6. Was a self-tracking handout given in paper form or via [employer]? Yes  If yes, see orders or list here. Orders Placed This Encounter   Procedures    Pneumococcal conjugate vaccine 13-valent IM (PREVNAR 13)    POCT glycosylated hemoglobin (Hb A1C)    POCT Glucose    NC IMMUNIZ ADMIN,1 SINGLE/COMB VAC/TOXOID       Return in about 6 months (around 7/18/2018). Patient voiced understanding and agreed to treatment plan. This note is created with the assistance of a speech-recognition program. While intending to generate a document that actually reflects the content of the visit, the document can still have some mistakes which may not have been identified and corrected by editing.     Dr Edwin Zambrano MD, [address]  Associate , Department of Internal Medicine  [facility]  Attending [address] [facility], [facility]                   1/18/2018, 2:20 PM

## 2018-02-07 ENCOUNTER — HOSPITAL ENCOUNTER (EMERGENCY)
Age: 68
Discharge: HOME | End: 2018-02-07
Attending: EMERGENCY MEDICINE
Payer: MEDICARE

## 2018-02-07 ENCOUNTER — APPOINTMENT (OUTPATIENT)
Dept: CT IMAGING | Age: 68
End: 2018-02-07
Payer: MEDICARE

## 2018-02-07 ENCOUNTER — APPOINTMENT (OUTPATIENT)
Dept: GENERAL RADIOLOGY | Age: 68
End: 2018-02-07
Payer: MEDICARE

## 2018-02-07 VITALS
TEMPERATURE: 96.7 F | RESPIRATION RATE: 16 BRPM | SYSTOLIC BLOOD PRESSURE: 153 MMHG | BODY MASS INDEX: 47.48 KG/M2 | HEART RATE: 86 BPM | OXYGEN SATURATION: 99 % | HEIGHT: 65 IN | WEIGHT: 285 LBS | DIASTOLIC BLOOD PRESSURE: 89 MMHG

## 2018-02-07 DIAGNOSIS — M25.551 HIP PAIN, ACUTE, RIGHT: Primary | ICD-10-CM

## 2018-02-07 LAB
EKG ATRIAL RATE: 81 BPM
EKG P AXIS: 62 DEGREES
EKG P-R INTERVAL: 182 MS
EKG Q-T INTERVAL: 384 MS
EKG QRS DURATION: 98 MS
EKG QTC CALCULATION (BAZETT): 446 MS
EKG R AXIS: -19 DEGREES
EKG T AXIS: 57 DEGREES
EKG VENTRICULAR RATE: 81 BPM

## 2018-02-07 PROCEDURE — 74176 CT ABD & PELVIS W/O CONTRAST: CPT

## 2018-02-07 PROCEDURE — 6370000000 HC RX 637 (ALT 250 FOR IP): Performed by: STUDENT IN AN ORGANIZED HEALTH CARE EDUCATION/TRAINING PROGRAM

## 2018-02-07 PROCEDURE — 93005 ELECTROCARDIOGRAM TRACING: CPT

## 2018-02-07 PROCEDURE — 99285 EMERGENCY DEPT VISIT HI MDM: CPT

## 2018-02-07 PROCEDURE — 73502 X-RAY EXAM HIP UNI 2-3 VIEWS: CPT

## 2018-02-07 RX ORDER — IBUPROFEN 400 MG/1
400 TABLET ORAL EVERY 8 HOURS PRN
Qty: 90 TABLET | Refills: 0 | Status: ON HOLD | OUTPATIENT
Start: 2018-02-07 | End: 2018-05-30 | Stop reason: HOSPADM

## 2018-02-07 RX ORDER — CYCLOBENZAPRINE HCL 10 MG
10 TABLET ORAL 2 TIMES DAILY PRN
Qty: 10 TABLET | Refills: 0 | Status: SHIPPED | OUTPATIENT
Start: 2018-02-07 | End: 2018-02-17

## 2018-02-07 RX ORDER — OXYCODONE HYDROCHLORIDE AND ACETAMINOPHEN 5; 325 MG/1; MG/1
1 TABLET ORAL ONCE
Status: COMPLETED | OUTPATIENT
Start: 2018-02-07 | End: 2018-02-07

## 2018-02-07 RX ORDER — ACETAMINOPHEN 325 MG/1
325 TABLET ORAL EVERY 6 HOURS PRN
Qty: 90 TABLET | Refills: 0 | Status: ON HOLD | OUTPATIENT
Start: 2018-02-07 | End: 2019-02-11 | Stop reason: HOSPADM

## 2018-02-07 RX ADMIN — OXYCODONE HYDROCHLORIDE AND ACETAMINOPHEN 1 TABLET: 5; 325 TABLET ORAL at 20:02

## 2018-02-07 ASSESSMENT — PAIN SCALES - GENERAL
PAINLEVEL_OUTOF10: 8
PAINLEVEL_OUTOF10: 4

## 2018-02-07 ASSESSMENT — PAIN DESCRIPTION - PAIN TYPE: TYPE: ACUTE PAIN

## 2018-02-07 ASSESSMENT — PAIN DESCRIPTION - FREQUENCY: FREQUENCY: CONTINUOUS

## 2018-02-07 ASSESSMENT — PAIN DESCRIPTION - DESCRIPTORS: DESCRIPTORS: ACHING

## 2018-02-07 ASSESSMENT — PAIN DESCRIPTION - LOCATION
LOCATION: HIP
LOCATION: BACK;LEG

## 2018-02-07 ASSESSMENT — PAIN DESCRIPTION - ORIENTATION: ORIENTATION: RIGHT

## 2018-02-07 NOTE — ED TRIAGE NOTES
Ptr reports she is having pain in her lower back x 2 days. Pt states \"I think it is my nerve. \" Pt reports it radiates to her right leg and is under both arms. Pt states she has had aching everywhere including her chest. Pt has hx of 3 cardiac stents.

## 2018-02-07 NOTE — ED PROVIDER NOTES
[facility]  ED  Emergency Department Encounter  Emergency Medicine Resident     Pt Name: Brenna Tavarez  MRN: [id number]  [place] 1950  Date of evaluation: 2/7/18  PCP:  Susan Sahu MD    [address] [place] [address]       Chief Complaint   Patient presents with    Back Pain    Leg Pain       HISTORY OF PRESENT ILLNESS  (Location/Symptom, Timing/Onset, Context/Setting, Quality, Duration, Modifying Factors, Severity.)      Brenna Tavarez is a 75 y.o. female who presents with Right-sided hip and leg pain that started earlier this morning. Patient denies any trauma or injury and denies ever having anything like this before. Additionally patient notes back pain and right flank pain. PAST MEDICAL / SURGICAL / SOCIAL / FAMILY HISTORY      has a past medical history of HOSSEIN (acute kidney injury) ([address] Arthritis; Chronic diastolic congestive heart failure ([address] Chronic kidney disease; Coronary artery disease involving native coronary artery of native heart without angina pectoris; GERD (gastroesophageal reflux disease); Headache; Hyperlipidemia; Hypertension; MI (myocardial infarction); Neuromuscular disorder ([address] Osteoarthritis; Patient in clinical research study; Sleep apnea; Snores; Type II or unspecified type diabetes mellitus without mention of complication, not stated as uncontrolled; Use of cane as ambulatory aid; and Wears contact lenses. has a past surgical history that includes Hysterectomy (30 years ); Knee arthroscopy (Left); Total knee arthroplasty (Right, 07/21/2016); Coronary angioplasty with stent; Cardiac surgery; and joint replacement (Left, 03/2016). Social History     Social History    Marital status: Single     Spouse name: N/A    Number of children: N/A    Years of education: N/A     Occupational History    Not on file.      Social History Main Topics    Smoking status: Never Smoker    Smokeless tobacco: Never Used    Alcohol use No    Drug use: No    Sexual time. No cranial nerve deficit. Skin: Skin is warm and dry. She is not diaphoretic. Psychiatric: She has a normal mood and affect. Thought content normal.   Vitals reviewed. DIFFERENTIAL  DIAGNOSIS     PLAN (LABS / IMAGING / EKG):  Orders Placed This Encounter   Procedures    CT ABDOMEN PELVIS WO CONTRAST    XR HIP RIGHT (2-3 VIEWS)    EKG 12 Lead       MEDICATIONS ORDERED:  Orders Placed This Encounter   Medications    oxyCODONE-acetaminophen (PERCOCET) 5-325 MG per tablet 1 tablet    ibuprofen (ADVIL;MOTRIN) 400 MG tablet     Sig: Take 1 tablet by mouth every 8 hours as needed for Pain or Fever     Dispense:  90 tablet     Refill:  0    acetaminophen (TYLENOL) 325 MG tablet     Sig: Take 1 tablet by mouth every 6 hours as needed for Pain     Dispense:  90 tablet     Refill:  0    cyclobenzaprine (FLEXERIL) 10 MG tablet     Sig: Take 1 tablet by mouth 2 times daily as needed for Muscle spasms     Dispense:  10 tablet     Refill:  0       DDX: IT band tendinitis, musculoskeletal pain, strain, sprain, fracture, AAA, cholelithiasis,      DIAGNOSTIC RESULTS / [address] [place] [address] / [facility]     LABS:  No results found for this visit on 02/07/18. IMPRESSION: Patient is a 48-year-old female who presents with acute right-sided hip, lateral leg, back pain. Patient denies any history of trauma or injury. We will obtain x-rays of the hip, CT abdomen and pelvis without contrast to evaluate for AAA versus abdominal pathology. We will give him Flexeril and Percocet and reevaluated patient. Patient requesting discharge. Patient was given written and verbal instructions prior to discharge. Patient understood and agreed. The patient had no further questions. Pre-hypertension/Hypertension: The patient has been informed that they may have pre-hypertension or Hypertension based on a blood pressure reading in the emergency department.  I recommend that the patient call the primary care provider listed on their discharge instructions or a physician of their choice this week to arrange follow up for further evaluation of possible pre-hypertension or Hypertension. Pregnancy test was not ordered because Patient is status post hysterectomy        RADIOLOGY:  Ct Abdomen Pelvis Wo Contrast    Result Date: 2/7/2018  EXAMINATION: CT OF THE ABDOMEN AND PELVIS WITHOUT CONTRAST 2/7/2018 7:49 pm TECHNIQUE: CT of the abdomen and pelvis was performed without the administration of intravenous contrast. Multiplanar reformatted images are provided for review. Dose modulation, iterative reconstruction, and/or weight based adjustment of the mA/kV was utilized to reduce the radiation dose to as low as reasonably achievable. COMPARISON: None. HISTORY: ORDERING SYSTEM PROVIDED HISTORY: R sided abdominal/Flank pain/back pain abpurbt onset atraumatic FINDINGS: Lower Chest: Minimal dependent atelectasis/ scarring within the right lung base. Otherwise lung bases are without focal airspace consolidation, sizeable pleural effusion, or pneumothorax. The base the heart is enlarged and there are multifocal multivessel coronary artery calcifications. No definite pericardial effusion. Organs: The liver, gallbladder, pancreas, and spleen are grossly within normal limits. No evidence for intrahepatic or extrahepatic biliary ductal dilatation. GI/Bowel: There is no evidence for bowel obstruction or inflammation. No free intraperitoneal air or fluid. Small hiatal hernia. Small bowel loops are normal in caliber. No evidence for hiatal hernia. Appendix is unremarkable. Colonic diverticulosis without evidence for acute diverticulitis. Pelvis: No evidence for free fluid. Peritoneum/Retroperitoneum: Adrenal glands are normal in size and configuration. The kidneys are normal in size without definite nephrolithiasis or hydronephrosis. Multiple parapelvic hypodense lesions are identified which are most compatible with parapelvic cysts.   The

## 2018-02-08 ENCOUNTER — CARE COORDINATION (OUTPATIENT)
Dept: CARE COORDINATION | Age: 68
End: 2018-02-08

## 2018-02-08 ASSESSMENT — ENCOUNTER SYMPTOMS
DIARRHEA: 0
SHORTNESS OF BREATH: 0
ABDOMINAL PAIN: 0
VOMITING: 0
BACK PAIN: 1
NAUSEA: 0
CONSTIPATION: 0
WHEEZING: 0
COUGH: 0

## 2018-02-08 NOTE — ED PROVIDER NOTES
[facility]     Emergency Department     Faculty Attestation    I performed a history and physical examination of the patient and discussed management with the resident. I reviewed the residents note and agree with the documented findings and plan of care. Any areas of disagreement are noted on the chart. I was personally present for the key portions of any procedures. I have documented in the chart those procedures where I was not present during the key portions. I have reviewed the emergency nurses triage note. I agree with the chief complaint, past medical history, past surgical history, allergies, medications, social and family history as documented unless otherwise noted below. For Physician Assistant/ Nurse Practitioner cases/documentation I have personally evaluated this patient and have completed at least one if not all key elements of the E/M (history, physical exam, and MDM). Additional findings are as noted. I have personally seen and evaluated the patient. I find the patient's history and physical exam are consistent with the NP/PA documentation. I agree with the care provided, treatment rendered, disposition and follow-up plan. Reporting back pain and hip pain about the onset atraumatic in nature patient has tenderness throughout the right lower flank and right lower abdomen and right hip. Pain is relieved with rest fact she is asymptomatic at rest markedly tender with range of motion      Critical Care     Elba Manriquez M.D.   Attending Emergency  Physician              Emery Torres MD  02/07/18 9994

## 2018-02-13 ENCOUNTER — CARE COORDINATION (OUTPATIENT)
Dept: CARE COORDINATION | Age: 68
End: 2018-02-13

## 2018-02-13 ENCOUNTER — TELEPHONE (OUTPATIENT)
Dept: NEUROLOGY | Age: 68
End: 2018-02-13

## 2018-02-13 DIAGNOSIS — E11.69 DIABETES MELLITUS TYPE 2 IN OBESE (HCC): ICD-10-CM

## 2018-02-13 DIAGNOSIS — E66.9 DIABETES MELLITUS TYPE 2 IN OBESE (HCC): ICD-10-CM

## 2018-02-13 RX ORDER — ATORVASTATIN CALCIUM 80 MG/1
80 TABLET, FILM COATED ORAL NIGHTLY
Qty: 30 TABLET | Refills: 3 | Status: SHIPPED | OUTPATIENT
Start: 2018-02-13 | End: 2019-05-30 | Stop reason: SDUPTHER

## 2018-02-13 RX ORDER — OXYBUTYNIN CHLORIDE 5 MG/1
TABLET ORAL
Qty: 90 TABLET | Refills: 5 | Status: SHIPPED | OUTPATIENT
Start: 2018-02-13 | End: 2018-05-15 | Stop reason: SDUPTHER

## 2018-02-13 RX ORDER — LOSARTAN POTASSIUM 50 MG/1
TABLET ORAL
Qty: 30 TABLET | Refills: 3 | Status: SHIPPED | OUTPATIENT
Start: 2018-02-13 | End: 2018-05-15 | Stop reason: SDUPTHER

## 2018-02-13 RX ORDER — AMLODIPINE BESYLATE 5 MG/1
5 TABLET ORAL NIGHTLY
Qty: 30 TABLET | Refills: 3 | Status: SHIPPED | OUTPATIENT
Start: 2018-02-13 | End: 2018-04-12 | Stop reason: SDUPTHER

## 2018-02-13 RX ORDER — AMITRIPTYLINE HYDROCHLORIDE 50 MG/1
50 TABLET, FILM COATED ORAL NIGHTLY
Qty: 30 TABLET | Refills: 3 | Status: SHIPPED | OUTPATIENT
Start: 2018-02-13 | End: 2018-06-07 | Stop reason: SDUPTHER

## 2018-02-13 RX ORDER — CLOPIDOGREL BISULFATE 75 MG/1
75 TABLET ORAL DAILY
Qty: 30 TABLET | Refills: 3 | Status: SHIPPED | OUTPATIENT
Start: 2018-02-13 | End: 2018-05-15 | Stop reason: SDUPTHER

## 2018-02-13 NOTE — CARE COORDINATION
Ambulatory Care Coordination Note  2/13/2018  CM Risk Score: 7  Bhavna Mortality Risk Score: 14.73    ACC: Lisa Rizzo RN    Summary Note: CC received call from patient, stating that she needed refills on several medications. She had recently changed insurance providers and had been receiving her meds through the mail. CC reviewed medications and which ones she needed filled. She would like them sent to the [facility] on [facility]. She also needs glucose strips. She ran out and just stopped checking her blood sugar. She stated \"I know I'm supposed to be following up at the office, but I'm doing better\". This was in regards to the recent ER visit for right hip pain. She said she had to  the flexeril yet, but had the motrin. She recently started on a CPAP, in October,  but stated she has been having trouble with the mask and a new order had been sent to [place] [address] on [address]. She said she has tried to call them, but they never return her call. She has not been using the CPAP for several weeks. CC will call and find out what is needed. [address] regarding the CPAP. They did receive the fax and adjusted the settings- it is done by wireless access. They had informed the patient. They have documented several phone calls, with patient, regarding the CPAP and mask. Explained that the patient was still having trouble with the mask. They stated that she is eligible to get new pads, tubing, supplies for the CPAP. Explained that the patient stated that she had called and was waiting for someone to return her call. They will reach out to the patient.         Ambulatory Care Coordination Assessment    Care Coordination Protocol  Program Enrollment:  Complex Care  Referral from Primary Care Provider:  No  Week 1 - Initial Assessment     Do you have all of your prescriptions and are they filled?:  Yes  Barriers to medication adherence:  None  Are you able to afford your MG capsule Take 100 mg by mouth daily as needed for Constipation    Historical Provider, MD   glucose blood VI test strips (ONETOUCH VERIO) strip 1 each by In Vitro route daily Daily. She has a OneTouch Verio machine. Needs compatible test strips.  10/11/16   Meet Mazariegos MD       Future Appointments  Date Time Provider Trang Love   3/6/2018 10:30 AM [facility] RM 1 [facility] Radiolog   3/13/2018 3:00 PM Rajani Torres MD Neuro Spec [id number]   7/19/2018 1:40 PM Rock Velásquez MD [facility] IM [address] [facility]

## 2018-03-13 ENCOUNTER — OFFICE VISIT (OUTPATIENT)
Dept: NEUROLOGY | Age: 68
End: 2018-03-13
Payer: MEDICARE

## 2018-03-13 VITALS
HEART RATE: 88 BPM | HEIGHT: 65 IN | WEIGHT: 286.6 LBS | SYSTOLIC BLOOD PRESSURE: 144 MMHG | BODY MASS INDEX: 47.75 KG/M2 | DIASTOLIC BLOOD PRESSURE: 98 MMHG

## 2018-03-13 DIAGNOSIS — G47.33 OBSTRUCTIVE SLEEP APNEA SYNDROME: Primary | ICD-10-CM

## 2018-03-13 DIAGNOSIS — G43.009 MIGRAINE WITHOUT AURA AND WITHOUT STATUS MIGRAINOSUS, NOT INTRACTABLE: ICD-10-CM

## 2018-03-13 PROCEDURE — 99214 OFFICE O/P EST MOD 30 MIN: CPT | Performed by: PSYCHIATRY & NEUROLOGY

## 2018-03-13 PROCEDURE — 3014F SCREEN MAMMO DOC REV: CPT | Performed by: PSYCHIATRY & NEUROLOGY

## 2018-03-13 PROCEDURE — 1036F TOBACCO NON-USER: CPT | Performed by: PSYCHIATRY & NEUROLOGY

## 2018-03-13 PROCEDURE — 4040F PNEUMOC VAC/ADMIN/RCVD: CPT | Performed by: PSYCHIATRY & NEUROLOGY

## 2018-03-13 PROCEDURE — G8417 CALC BMI ABV UP PARAM F/U: HCPCS | Performed by: PSYCHIATRY & NEUROLOGY

## 2018-03-13 PROCEDURE — G8399 PT W/DXA RESULTS DOCUMENT: HCPCS | Performed by: PSYCHIATRY & NEUROLOGY

## 2018-03-13 PROCEDURE — G8484 FLU IMMUNIZE NO ADMIN: HCPCS | Performed by: PSYCHIATRY & NEUROLOGY

## 2018-03-13 PROCEDURE — 3017F COLORECTAL CA SCREEN DOC REV: CPT | Performed by: PSYCHIATRY & NEUROLOGY

## 2018-03-13 PROCEDURE — 1090F PRES/ABSN URINE INCON ASSESS: CPT | Performed by: PSYCHIATRY & NEUROLOGY

## 2018-03-13 PROCEDURE — 1123F ACP DISCUSS/DSCN MKR DOCD: CPT | Performed by: PSYCHIATRY & NEUROLOGY

## 2018-03-13 PROCEDURE — G8598 ASA/ANTIPLAT THER USED: HCPCS | Performed by: PSYCHIATRY & NEUROLOGY

## 2018-03-13 PROCEDURE — G8427 DOCREV CUR MEDS BY ELIG CLIN: HCPCS | Performed by: PSYCHIATRY & NEUROLOGY

## 2018-03-13 ASSESSMENT — ENCOUNTER SYMPTOMS
CONSTIPATION: 1
ALLERGIC/IMMUNOLOGIC NEGATIVE: 1
EYES NEGATIVE: 1
RESPIRATORY NEGATIVE: 1

## 2018-03-13 NOTE — PROGRESS NOTES
 Wears contact lenses     LEFT EYE ONLY       Past Surgical History:   Procedure Laterality Date    CARDIAC SURGERY      2 stents  placed 2015    CORONARY ANGIOPLASTY WITH STENT PLACEMENT      HYSTERECTOMY  30 years     JOINT REPLACEMENT Left 03/2016    bilat. knees    KNEE ARTHROSCOPY Left     TOTAL KNEE ARTHROPLASTY Right 07/21/2016       Family History   Problem Relation Age of Onset    Stroke Mother     Heart Disease Mother     Heart Disease Father     Other Sister      migraines    Heart Disease Brother        Social History     Social History    Marital status: Single     Spouse name: N/A    Number of children: N/A    Years of education: N/A     Social History Main Topics    Smoking status: Never Smoker    Smokeless tobacco: Never Used    Alcohol use No    Drug use: No    Sexual activity: Not Asked     Other Topics Concern    None     Social History Narrative    None       Current Outpatient Prescriptions   Medication Sig Dispense Refill    losartan (COZAAR) 50 MG tablet take 1 tablet by mouth once daily 30 tablet 3    oxybutynin (DITROPAN) 5 MG tablet TAKE 1 TABLET EVERY DAY 90 tablet 5    amLODIPine (NORVASC) 5 MG tablet Take 1 tablet by mouth nightly 30 tablet 3    clopidogrel (PLAVIX) 75 MG tablet Take 1 tablet by mouth daily 30 tablet 3    amitriptyline (ELAVIL) 50 MG tablet Take 1 tablet by mouth nightly 30 tablet 3    atorvastatin (LIPITOR) 80 MG tablet Take 1 tablet by mouth nightly 30 tablet 3    glucose blood VI test strips (ONETOUCH VERIO) strip 1 each by In Vitro route daily Daily. She has a OneTouch Verio machine. Needs compatible test strips.  50 each 3    ibuprofen (ADVIL;MOTRIN) 400 MG tablet Take 1 tablet by mouth every 8 hours as needed for Pain or Fever 90 tablet 0    acetaminophen (TYLENOL) 325 MG tablet Take 1 tablet by mouth every 6 hours as needed for Pain 90 tablet 0    Respiratory Therapy Supplies AMANDEEP Lower nasal CPAP pressure 14 cm H20 1 Device 0    metFORMIN (GLUCOPHAGE) 500 MG tablet Take 1,000 mg by mouth 2 times daily (with meals)      docusate sodium (COLACE) 100 MG capsule Take 100 mg by mouth daily as needed for Constipation      magnesium hydroxide (MILK OF MAGNESIA) 400 MG/5ML suspension Take 5 mLs by mouth daily as needed for Constipation      furosemide (LASIX) 20 MG tablet Take 1 tablet by mouth daily 60 tablet 3    aspirin 81 MG tablet Take 1 tablet by mouth daily 30 tablet 3     No current facility-administered medications for this visit. Allergies   Allergen Reactions    Lisinopril Itching     Inflammation, burning, itching    Pcn [Penicillins] Hives       Review of Systems   Constitutional: Positive for fatigue. HENT: Negative. Eyes: Negative. Respiratory: Negative. Cardiovascular: Negative. Gastrointestinal: Positive for constipation. Endocrine: Negative. Genitourinary: Negative. Musculoskeletal: Negative. Skin: Negative. Allergic/Immunologic: Negative. Neurological: Negative. Hematological: Negative. Psychiatric/Behavioral: Negative. Objective:   Physical Exam    Vitals:    03/13/18 1502   BP: (!) 144/98   Pulse: 88     weight: 286 lb 9.6 oz (130 kg)    Neurological Examination  Constitutional .General exam well groomed   Ears /Nose/Throat: external ear . Normal exam  Neck and thyroid . Normal size  Cardiovascular: Auscultation of heart with regular rate and rhythm   Musculoskeletal. Muscle bulk and tone normal                                                           Muscle strength 5/5 strength throughout                                                                                 No dysmetria or dysdiadokinesis  No tremor   Normal fine motor  Orientation Alert and oriented x 3   Language process and speech normal . No aphasia   Cranial nerve 2 normal acuety and visual fields  Cranial nerve 3, 4 and 6 . Extraocular muscles are intact . Pupils are equal and reactive   Cranial nerve 5 . Intact corneal reflex. Normal facial sensation  Cranial nerve 7 normal exam   Cranial nerve 8. Grossly intact hearing   Cranial nerve 9 and 10. Symmetric palate elevation   Cranial nerve 11 , 5 out of 5 strength   Cranial Nerve 12 midline tongue . No atrophy  Sensation .  pinprick and light touch in stocking distribution   Deep Tendon Reflexes absent ankle jerks   Plantar response flexor bilaterally     Assessment:      1. Obstructive sleep apnea syndrome    2. Migraine without aura and without status migrainosus, not intractable    She was seeing clear benefit from nasal CPAP and would like to give this another try . Under medicare guidelines she needs to return to the sleep lab for retitration for which will do split night study         Plan:      Orders Placed This Encounter   Procedures    Sleep Study with PAP Titration     Standing Status:   Future     Standing Expiration Date:   3/13/2019     Order Specific Question:   Sleep Study Titration Type     Answer:   Split Night Study (Baseline followed by PAP Titration)     Order Specific Question:   Location For Sleep Study     Answer:    Terell Vela Specific Question:   Select Sleep Lab Location     Answer:   [facility]

## 2018-03-13 NOTE — LETTER
[facility] Neurology Specialist  [facility] [address]  Phone: [phone]  Fax: [phone]    Yusra Sharp MD        March 14, 2018       Patient: Osiris Goetz   MR Number: [id number]   YOB: 1950   Date of Visit: 3/13/2018       Dear Dr. Leslie Layer:    Thank you for the request for consultation for Archie Pruitt . Below are the relevant portions of my assessment and plan of care. Subjective:      Patient ID: Osiris Goetz is a 75 y.o. female. HPI    Active problem sleep apnea on nasal CPAP along with migraine headaches  . Syncope  . The condition she reports that nasal CPAP machine was taken away by DME under medicare guidlines for inadequate use . She was having difficulty using this with pressure having been lowered to 14 cm H20 although still had inadequate use at night . She reports that she was feeling better the next day even with partial nasal CPAP use  . She was using nasal CPAP from 11PM to 2 AM feeling rested the next day now being fatigued and sleepy. She is having no headaches at this time remaining on elavil . She denies focal motor ,sensory or bulbar complaint . There is obesity BMI 47. Significant medications elavil 50 mg po qhs , fioricet PRN , plavix 75 mg po qd , aspirin 81 mg po qd. Testing MRI of Head with 7 mm left frontal meningioma , April 2017. Head CT with small left frontal extraxial mass concordant with meningioma . Carotid ultrasound 1-15 % stenosis bilaterally . Cardiac 2 D echo hyperdynamic LVF EF > 65 %. Polysomnogram apnea hypopnea index 53 episodes per hour with oxyhemoglobin desaturation to 64 % , September 2017 .  CPAP 14 cm H20      Past Medical History:   Diagnosis Date    HOSSEIN (acute kidney injury) ([address] 3/3/2017    Arthritis     shoulders    Chronic diastolic congestive heart failure ([address] 3/3/2017    Chronic kidney disease     Coronary artery disease involving native coronary artery of native heart [address] [address] [address] [facility]                                                                             [address] [place] [address]

## 2018-03-19 ENCOUNTER — HOSPITAL ENCOUNTER (OUTPATIENT)
Dept: SLEEP CENTER | Age: 68
Discharge: HOME | End: 2018-03-21
Payer: MEDICARE

## 2018-03-19 DIAGNOSIS — G47.33 OBSTRUCTIVE SLEEP APNEA SYNDROME: ICD-10-CM

## 2018-03-19 PROCEDURE — 95811 POLYSOM 6/>YRS CPAP 4/> PARM: CPT

## 2018-03-19 ASSESSMENT — SLEEP AND FATIGUE QUESTIONNAIRES
ESS TOTAL SCORE: 4
HOW LIKELY ARE YOU TO NOD OFF OR FALL ASLEEP WHILE WATCHING TV: 0
HOW LIKELY ARE YOU TO NOD OFF OR FALL ASLEEP WHILE SITTING AND TALKING TO SOMEONE: 0
HOW LIKELY ARE YOU TO NOD OFF OR FALL ASLEEP WHILE LYING DOWN TO REST IN THE AFTERNOON WHEN CIRCUMSTANCES PERMIT: 0
HOW LIKELY ARE YOU TO NOD OFF OR FALL ASLEEP IN A CAR, WHILE STOPPED FOR A FEW MINUTES IN TRAFFIC: 0
HOW LIKELY ARE YOU TO NOD OFF OR FALL ASLEEP WHILE SITTING INACTIVE IN A PUBLIC PLACE: 0
HOW LIKELY ARE YOU TO NOD OFF OR FALL ASLEEP WHEN YOU ARE A PASSENGER IN A CAR FOR AN HOUR WITHOUT A BREAK: 2
HOW LIKELY ARE YOU TO NOD OFF OR FALL ASLEEP WHILE SITTING AND READING: 2
HOW LIKELY ARE YOU TO NOD OFF OR FALL ASLEEP WHILE SITTING QUIETLY AFTER LUNCH WITHOUT ALCOHOL: 0

## 2018-03-20 VITALS — WEIGHT: 286 LBS | HEART RATE: 87 BPM | RESPIRATION RATE: 18 BRPM | BODY MASS INDEX: 47.65 KG/M2 | HEIGHT: 65 IN

## 2018-04-04 ENCOUNTER — TELEPHONE (OUTPATIENT)
Dept: NEUROLOGY | Age: 68
End: 2018-04-04

## 2018-04-12 ENCOUNTER — CARE COORDINATION (OUTPATIENT)
Dept: CARE COORDINATION | Age: 68
End: 2018-04-12

## 2018-04-12 RX ORDER — AMLODIPINE BESYLATE 5 MG/1
5 TABLET ORAL NIGHTLY
Qty: 30 TABLET | Refills: 3 | Status: ON HOLD | OUTPATIENT
Start: 2018-04-12 | End: 2018-05-30 | Stop reason: HOSPADM

## 2018-04-17 ENCOUNTER — HOSPITAL ENCOUNTER (OUTPATIENT)
Dept: MAMMOGRAPHY | Age: 68
Discharge: HOME | End: 2018-04-19
Payer: MEDICARE

## 2018-04-17 DIAGNOSIS — Z12.31 ENCOUNTER FOR SCREENING MAMMOGRAM FOR BREAST CANCER: ICD-10-CM

## 2018-04-17 PROCEDURE — 77067 SCR MAMMO BI INCL CAD: CPT

## 2018-04-20 ENCOUNTER — TELEPHONE (OUTPATIENT)
Dept: NEUROLOGY | Age: 68
End: 2018-04-20

## 2018-05-07 ENCOUNTER — TELEPHONE (OUTPATIENT)
Dept: NEUROLOGY | Age: 68
End: 2018-05-07

## 2018-05-07 DIAGNOSIS — G47.33 OBSTRUCTIVE SLEEP APNEA SYNDROME: Primary | ICD-10-CM

## 2018-05-15 RX ORDER — LOSARTAN POTASSIUM 50 MG/1
TABLET ORAL
Qty: 30 TABLET | Refills: 3 | Status: ON HOLD | OUTPATIENT
Start: 2018-05-15 | End: 2018-05-30 | Stop reason: HOSPADM

## 2018-05-15 RX ORDER — CLOPIDOGREL BISULFATE 75 MG/1
75 TABLET ORAL DAILY
Qty: 30 TABLET | Refills: 3 | Status: ON HOLD | OUTPATIENT
Start: 2018-05-15 | End: 2018-05-30 | Stop reason: HOSPADM

## 2018-05-15 RX ORDER — OXYBUTYNIN CHLORIDE 5 MG/1
TABLET ORAL
Qty: 90 TABLET | Refills: 5 | Status: SHIPPED | OUTPATIENT
Start: 2018-05-15 | End: 2018-06-21 | Stop reason: SDUPTHER

## 2018-05-28 ENCOUNTER — APPOINTMENT (OUTPATIENT)
Dept: GENERAL RADIOLOGY | Age: 68
DRG: 309 | End: 2018-05-28
Payer: MEDICARE

## 2018-05-28 ENCOUNTER — HOSPITAL ENCOUNTER (INPATIENT)
Age: 68
LOS: 2 days | Discharge: HOME | DRG: 309 | End: 2018-05-30
Attending: EMERGENCY MEDICINE | Admitting: INTERNAL MEDICINE
Payer: MEDICARE

## 2018-05-28 DIAGNOSIS — I48.91 ATRIAL FIBRILLATION WITH RVR (HCC): Primary | ICD-10-CM

## 2018-05-28 DIAGNOSIS — R07.9 CHEST PAIN, UNSPECIFIED TYPE: ICD-10-CM

## 2018-05-28 LAB
ABSOLUTE EOS #: 0.1 K/UL (ref 0–0.44)
ABSOLUTE IMMATURE GRANULOCYTE: <0.03 K/UL (ref 0–0.3)
ABSOLUTE LYMPH #: 2.64 K/UL (ref 1.1–3.7)
ABSOLUTE MONO #: 0.75 K/UL (ref 0.1–1.2)
ANION GAP SERPL CALCULATED.3IONS-SCNC: 15 MMOL/L (ref 9–17)
BASOPHILS # BLD: 0 % (ref 0–2)
BASOPHILS ABSOLUTE: <0.03 K/UL (ref 0–0.2)
BUN BLDV-MCNC: 16 MG/DL (ref 8–23)
BUN/CREAT BLD: ABNORMAL (ref 9–20)
CALCIUM SERPL-MCNC: 8.9 MG/DL (ref 8.6–10.4)
CHLORIDE BLD-SCNC: 104 MMOL/L (ref 98–107)
CO2: 21 MMOL/L (ref 20–31)
CREAT SERPL-MCNC: 0.8 MG/DL (ref 0.5–0.9)
DIFFERENTIAL TYPE: NORMAL
EOSINOPHILS RELATIVE PERCENT: 1 % (ref 1–4)
GFR AFRICAN AMERICAN: >60 ML/MIN
GFR NON-AFRICAN AMERICAN: >60 ML/MIN
GFR SERPL CREATININE-BSD FRML MDRD: ABNORMAL ML/MIN/{1.73_M2}
GFR SERPL CREATININE-BSD FRML MDRD: ABNORMAL ML/MIN/{1.73_M2}
GLUCOSE BLD-MCNC: 100 MG/DL (ref 65–105)
GLUCOSE BLD-MCNC: 115 MG/DL (ref 70–99)
HCT VFR BLD CALC: 39.3 % (ref 36.3–47.1)
HCT VFR BLD CALC: 39.4 % (ref 36.3–47.1)
HEMOGLOBIN: 12.1 G/DL (ref 11.9–15.1)
HEMOGLOBIN: 12.5 G/DL (ref 11.9–15.1)
IMMATURE GRANULOCYTES: 0 %
INR BLD: 1
LYMPHOCYTES # BLD: 33 % (ref 24–43)
MCH RBC QN AUTO: 29 PG (ref 25.2–33.5)
MCH RBC QN AUTO: 29.1 PG (ref 25.2–33.5)
MCHC RBC AUTO-ENTMCNC: 30.8 G/DL (ref 28.4–34.8)
MCHC RBC AUTO-ENTMCNC: 31.7 G/DL (ref 28.4–34.8)
MCV RBC AUTO: 91.4 FL (ref 82.6–102.9)
MCV RBC AUTO: 94.5 FL (ref 82.6–102.9)
MONOCYTES # BLD: 9 % (ref 3–12)
NRBC AUTOMATED: 0 PER 100 WBC
NRBC AUTOMATED: 0 PER 100 WBC
PARTIAL THROMBOPLASTIN TIME: 23.4 SEC (ref 20.5–30.5)
PDW BLD-RTO: 13.3 % (ref 11.8–14.4)
PDW BLD-RTO: 13.4 % (ref 11.8–14.4)
PLATELET # BLD: 184 K/UL (ref 138–453)
PLATELET # BLD: 194 K/UL (ref 138–453)
PLATELET ESTIMATE: NORMAL
PMV BLD AUTO: 10.2 FL (ref 8.1–13.5)
PMV BLD AUTO: 9.5 FL (ref 8.1–13.5)
POC TROPONIN I: 0 NG/ML (ref 0–0.1)
POC TROPONIN INTERP: NORMAL
POTASSIUM SERPL-SCNC: 4.1 MMOL/L (ref 3.7–5.3)
PROTHROMBIN TIME: 10.8 SEC (ref 9–12)
RBC # BLD: 4.16 M/UL (ref 3.95–5.11)
RBC # BLD: 4.31 M/UL (ref 3.95–5.11)
RBC # BLD: NORMAL 10*6/UL
SEG NEUTROPHILS: 57 % (ref 36–65)
SEGMENTED NEUTROPHILS ABSOLUTE COUNT: 4.54 K/UL (ref 1.5–8.1)
SODIUM BLD-SCNC: 140 MMOL/L (ref 135–144)
TROPONIN INTERP: NORMAL
TROPONIN INTERP: NORMAL
TROPONIN T: <0.03 NG/ML
TROPONIN T: <0.03 NG/ML
TSH SERPL DL<=0.05 MIU/L-ACNC: 0.94 MIU/L (ref 0.3–5)
WBC # BLD: 8.1 K/UL (ref 3.5–11.3)
WBC # BLD: 9.6 K/UL (ref 3.5–11.3)
WBC # BLD: NORMAL 10*3/UL

## 2018-05-28 PROCEDURE — 2580000003 HC RX 258: Performed by: EMERGENCY MEDICINE

## 2018-05-28 PROCEDURE — 80048 BASIC METABOLIC PNL TOTAL CA: CPT

## 2018-05-28 PROCEDURE — 96374 THER/PROPH/DIAG INJ IV PUSH: CPT

## 2018-05-28 PROCEDURE — 36415 COLL VENOUS BLD VENIPUNCTURE: CPT

## 2018-05-28 PROCEDURE — 6370000000 HC RX 637 (ALT 250 FOR IP): Performed by: INTERNAL MEDICINE

## 2018-05-28 PROCEDURE — 96375 TX/PRO/DX INJ NEW DRUG ADDON: CPT

## 2018-05-28 PROCEDURE — 84484 ASSAY OF TROPONIN QUANT: CPT

## 2018-05-28 PROCEDURE — 85730 THROMBOPLASTIN TIME PARTIAL: CPT

## 2018-05-28 PROCEDURE — 2500000003 HC RX 250 WO HCPCS: Performed by: EMERGENCY MEDICINE

## 2018-05-28 PROCEDURE — 6360000002 HC RX W HCPCS: Performed by: INTERNAL MEDICINE

## 2018-05-28 PROCEDURE — 84443 ASSAY THYROID STIM HORMONE: CPT

## 2018-05-28 PROCEDURE — 99285 EMERGENCY DEPT VISIT HI MDM: CPT

## 2018-05-28 PROCEDURE — 85027 COMPLETE CBC AUTOMATED: CPT

## 2018-05-28 PROCEDURE — 2060000000 HC ICU INTERMEDIATE R&B

## 2018-05-28 PROCEDURE — 85610 PROTHROMBIN TIME: CPT

## 2018-05-28 PROCEDURE — 93005 ELECTROCARDIOGRAM TRACING: CPT

## 2018-05-28 PROCEDURE — 82947 ASSAY GLUCOSE BLOOD QUANT: CPT

## 2018-05-28 PROCEDURE — 2500000003 HC RX 250 WO HCPCS: Performed by: INTERNAL MEDICINE

## 2018-05-28 PROCEDURE — 71045 X-RAY EXAM CHEST 1 VIEW: CPT

## 2018-05-28 PROCEDURE — 99223 1ST HOSP IP/OBS HIGH 75: CPT | Performed by: INTERNAL MEDICINE

## 2018-05-28 PROCEDURE — 85025 COMPLETE CBC W/AUTO DIFF WBC: CPT

## 2018-05-28 RX ORDER — DEXTROSE MONOHYDRATE 25 G/50ML
12.5 INJECTION, SOLUTION INTRAVENOUS PRN
Status: DISCONTINUED | OUTPATIENT
Start: 2018-05-28 | End: 2018-05-30 | Stop reason: HOSPADM

## 2018-05-28 RX ORDER — ONDANSETRON 2 MG/ML
4 INJECTION INTRAMUSCULAR; INTRAVENOUS EVERY 6 HOURS PRN
Status: DISCONTINUED | OUTPATIENT
Start: 2018-05-28 | End: 2018-05-30 | Stop reason: HOSPADM

## 2018-05-28 RX ORDER — 0.9 % SODIUM CHLORIDE 0.9 %
1000 INTRAVENOUS SOLUTION INTRAVENOUS ONCE
Status: COMPLETED | OUTPATIENT
Start: 2018-05-28 | End: 2018-05-28

## 2018-05-28 RX ORDER — ASPIRIN 81 MG/1
324 TABLET, CHEWABLE ORAL ONCE
Status: DISCONTINUED | OUTPATIENT
Start: 2018-05-28 | End: 2018-05-30 | Stop reason: HOSPADM

## 2018-05-28 RX ORDER — HEPARIN SODIUM 1000 [USP'U]/ML
60 INJECTION, SOLUTION INTRAVENOUS; SUBCUTANEOUS PRN
Status: DISCONTINUED | OUTPATIENT
Start: 2018-05-28 | End: 2018-05-29

## 2018-05-28 RX ORDER — HEPARIN SODIUM 10000 [USP'U]/100ML
12 INJECTION, SOLUTION INTRAVENOUS CONTINUOUS
Status: DISCONTINUED | OUTPATIENT
Start: 2018-05-28 | End: 2018-05-28

## 2018-05-28 RX ORDER — FUROSEMIDE 20 MG/1
20 TABLET ORAL DAILY
Status: DISCONTINUED | OUTPATIENT
Start: 2018-05-28 | End: 2018-05-30 | Stop reason: HOSPADM

## 2018-05-28 RX ORDER — NICOTINE POLACRILEX 4 MG
15 LOZENGE BUCCAL PRN
Status: DISCONTINUED | OUTPATIENT
Start: 2018-05-28 | End: 2018-05-30 | Stop reason: HOSPADM

## 2018-05-28 RX ORDER — SODIUM CHLORIDE 0.9 % (FLUSH) 0.9 %
10 SYRINGE (ML) INJECTION EVERY 12 HOURS SCHEDULED
Status: DISCONTINUED | OUTPATIENT
Start: 2018-05-28 | End: 2018-05-30 | Stop reason: HOSPADM

## 2018-05-28 RX ORDER — ESMOLOL HYDROCHLORIDE 10 MG/ML
500 INJECTION INTRAVENOUS ONCE
Status: DISCONTINUED | OUTPATIENT
Start: 2018-05-28 | End: 2018-05-29

## 2018-05-28 RX ORDER — LOSARTAN POTASSIUM 50 MG/1
50 TABLET ORAL DAILY
Status: DISCONTINUED | OUTPATIENT
Start: 2018-05-28 | End: 2018-05-30

## 2018-05-28 RX ORDER — OXYBUTYNIN CHLORIDE 5 MG/1
5 TABLET ORAL DAILY
Status: DISCONTINUED | OUTPATIENT
Start: 2018-05-28 | End: 2018-05-30 | Stop reason: HOSPADM

## 2018-05-28 RX ORDER — SODIUM CHLORIDE 0.9 % (FLUSH) 0.9 %
10 SYRINGE (ML) INJECTION PRN
Status: DISCONTINUED | OUTPATIENT
Start: 2018-05-28 | End: 2018-05-30 | Stop reason: HOSPADM

## 2018-05-28 RX ORDER — DEXTROSE MONOHYDRATE 50 MG/ML
100 INJECTION, SOLUTION INTRAVENOUS PRN
Status: DISCONTINUED | OUTPATIENT
Start: 2018-05-28 | End: 2018-05-30 | Stop reason: HOSPADM

## 2018-05-28 RX ORDER — HEPARIN SODIUM 10000 [USP'U]/100ML
1000 INJECTION, SOLUTION INTRAVENOUS CONTINUOUS
Status: DISCONTINUED | OUTPATIENT
Start: 2018-05-28 | End: 2018-05-29

## 2018-05-28 RX ORDER — METOPROLOL TARTRATE 5 MG/5ML
5 INJECTION INTRAVENOUS ONCE
Status: COMPLETED | OUTPATIENT
Start: 2018-05-28 | End: 2018-05-28

## 2018-05-28 RX ORDER — HEPARIN SODIUM 1000 [USP'U]/ML
60 INJECTION, SOLUTION INTRAVENOUS; SUBCUTANEOUS ONCE
Status: COMPLETED | OUTPATIENT
Start: 2018-05-28 | End: 2018-05-28

## 2018-05-28 RX ORDER — AMITRIPTYLINE HYDROCHLORIDE 50 MG/1
50 TABLET, FILM COATED ORAL NIGHTLY
Status: DISCONTINUED | OUTPATIENT
Start: 2018-05-28 | End: 2018-05-30 | Stop reason: HOSPADM

## 2018-05-28 RX ORDER — ATORVASTATIN CALCIUM 80 MG/1
80 TABLET, FILM COATED ORAL NIGHTLY
Status: DISCONTINUED | OUTPATIENT
Start: 2018-05-28 | End: 2018-05-30 | Stop reason: HOSPADM

## 2018-05-28 RX ORDER — ESMOLOL HYDROCHLORIDE 10 MG/ML
50 INJECTION, SOLUTION INTRAVENOUS CONTINUOUS
Status: DISCONTINUED | OUTPATIENT
Start: 2018-05-28 | End: 2018-05-29

## 2018-05-28 RX ORDER — HEPARIN SODIUM 1000 [USP'U]/ML
30 INJECTION, SOLUTION INTRAVENOUS; SUBCUTANEOUS PRN
Status: DISCONTINUED | OUTPATIENT
Start: 2018-05-28 | End: 2018-05-29

## 2018-05-28 RX ORDER — DOCUSATE SODIUM 100 MG/1
100 CAPSULE, LIQUID FILLED ORAL DAILY PRN
Status: DISCONTINUED | OUTPATIENT
Start: 2018-05-28 | End: 2018-05-30 | Stop reason: HOSPADM

## 2018-05-28 RX ORDER — ESMOLOL HYDROCHLORIDE 10 MG/ML
100 INJECTION, SOLUTION INTRAVENOUS CONTINUOUS
Status: DISCONTINUED | OUTPATIENT
Start: 2018-05-28 | End: 2018-05-29

## 2018-05-28 RX ADMIN — SODIUM CHLORIDE 1000 ML: 9 INJECTION, SOLUTION INTRAVENOUS at 14:09

## 2018-05-28 RX ADMIN — HEPARIN SODIUM AND DEXTROSE 1000 UNITS/HR: 10000; 5 INJECTION INTRAVENOUS at 18:28

## 2018-05-28 RX ADMIN — LOSARTAN POTASSIUM 50 MG: 50 TABLET, FILM COATED ORAL at 18:24

## 2018-05-28 RX ADMIN — ATORVASTATIN CALCIUM 80 MG: 80 TABLET, FILM COATED ORAL at 21:17

## 2018-05-28 RX ADMIN — ESMOLOL HYDROCHLORIDE 100 MCG/KG/MIN: 10 INJECTION INTRAVENOUS at 14:09

## 2018-05-28 RX ADMIN — METFORMIN HYDROCHLORIDE 1000 MG: 500 TABLET ORAL at 18:24

## 2018-05-28 RX ADMIN — AMITRIPTYLINE HYDROCHLORIDE 50 MG: 50 TABLET, FILM COATED ORAL at 21:17

## 2018-05-28 RX ADMIN — HEPARIN SODIUM 3420 UNITS: 1000 INJECTION, SOLUTION INTRAVENOUS; SUBCUTANEOUS at 18:27

## 2018-05-28 RX ADMIN — FUROSEMIDE 20 MG: 20 TABLET ORAL at 18:24

## 2018-05-28 RX ADMIN — OXYBUTYNIN CHLORIDE 5 MG: 5 TABLET ORAL at 18:24

## 2018-05-28 RX ADMIN — METOPROLOL TARTRATE 25 MG: 25 TABLET ORAL at 21:18

## 2018-05-28 RX ADMIN — ESMOLOL HYDROCHLORIDE 100 MCG/KG/MIN: 10 INJECTION INTRAVENOUS at 17:29

## 2018-05-28 RX ADMIN — METOROPROLOL TARTRATE 5 MG: 5 INJECTION, SOLUTION INTRAVENOUS at 14:16

## 2018-05-28 ASSESSMENT — PAIN DESCRIPTION - LOCATION: LOCATION: CHEST

## 2018-05-28 ASSESSMENT — ENCOUNTER SYMPTOMS
COLOR CHANGE: 0
ABDOMINAL PAIN: 0
COUGH: 0
VOMITING: 0
NAUSEA: 0
SHORTNESS OF BREATH: 1
EYE PAIN: 0

## 2018-05-28 ASSESSMENT — PAIN DESCRIPTION - PAIN TYPE: TYPE: ACUTE PAIN

## 2018-05-28 ASSESSMENT — PAIN DESCRIPTION - FREQUENCY: FREQUENCY: CONTINUOUS

## 2018-05-28 ASSESSMENT — PAIN SCALES - GENERAL
PAINLEVEL_OUTOF10: 0
PAINLEVEL_OUTOF10: 8

## 2018-05-28 ASSESSMENT — PAIN DESCRIPTION - ORIENTATION: ORIENTATION: MID

## 2018-05-29 ENCOUNTER — APPOINTMENT (OUTPATIENT)
Dept: NUCLEAR MEDICINE | Age: 68
DRG: 309 | End: 2018-05-29
Payer: MEDICARE

## 2018-05-29 PROBLEM — E86.9 VOLUME DEPLETION: Status: RESOLVED | Noted: 2017-09-04 | Resolved: 2018-05-29

## 2018-05-29 PROBLEM — R55 SYNCOPE AND COLLAPSE: Status: RESOLVED | Noted: 2017-09-04 | Resolved: 2018-05-29

## 2018-05-29 LAB
ABSOLUTE EOS #: 0.29 K/UL (ref 0–0.44)
ABSOLUTE IMMATURE GRANULOCYTE: <0.03 K/UL (ref 0–0.3)
ABSOLUTE LYMPH #: 3.02 K/UL (ref 1.1–3.7)
ABSOLUTE MONO #: 0.65 K/UL (ref 0.1–1.2)
ANION GAP SERPL CALCULATED.3IONS-SCNC: 14 MMOL/L (ref 9–17)
BASOPHILS # BLD: 0 % (ref 0–2)
BASOPHILS ABSOLUTE: 0.03 K/UL (ref 0–0.2)
BUN BLDV-MCNC: 15 MG/DL (ref 8–23)
BUN/CREAT BLD: ABNORMAL (ref 9–20)
CALCIUM SERPL-MCNC: 8.5 MG/DL (ref 8.6–10.4)
CHLORIDE BLD-SCNC: 103 MMOL/L (ref 98–107)
CO2: 22 MMOL/L (ref 20–31)
CREAT SERPL-MCNC: 0.89 MG/DL (ref 0.5–0.9)
DIFFERENTIAL TYPE: NORMAL
EKG ATRIAL RATE: 61 BPM
EKG ATRIAL RATE: 76 BPM
EKG P AXIS: 59 DEGREES
EKG P AXIS: 60 DEGREES
EKG P-R INTERVAL: 200 MS
EKG P-R INTERVAL: 206 MS
EKG Q-T INTERVAL: 422 MS
EKG Q-T INTERVAL: 470 MS
EKG QRS DURATION: 104 MS
EKG QRS DURATION: 96 MS
EKG QTC CALCULATION (BAZETT): 473 MS
EKG QTC CALCULATION (BAZETT): 474 MS
EKG R AXIS: -8 DEGREES
EKG R AXIS: -9 DEGREES
EKG T AXIS: 57 DEGREES
EKG T AXIS: 59 DEGREES
EKG VENTRICULAR RATE: 61 BPM
EKG VENTRICULAR RATE: 76 BPM
EOSINOPHILS RELATIVE PERCENT: 3 % (ref 1–4)
GFR AFRICAN AMERICAN: >60 ML/MIN
GFR NON-AFRICAN AMERICAN: >60 ML/MIN
GFR SERPL CREATININE-BSD FRML MDRD: ABNORMAL ML/MIN/{1.73_M2}
GFR SERPL CREATININE-BSD FRML MDRD: ABNORMAL ML/MIN/{1.73_M2}
GLUCOSE BLD-MCNC: 106 MG/DL (ref 65–105)
GLUCOSE BLD-MCNC: 122 MG/DL (ref 65–105)
GLUCOSE BLD-MCNC: 130 MG/DL (ref 70–99)
GLUCOSE BLD-MCNC: 91 MG/DL (ref 65–105)
GLUCOSE BLD-MCNC: 97 MG/DL (ref 65–105)
HCT VFR BLD CALC: 41.4 % (ref 36.3–47.1)
HEMOGLOBIN: 12.6 G/DL (ref 11.9–15.1)
IMMATURE GRANULOCYTES: 0 %
LV EF: 83 %
LVEF MODALITY: NORMAL
LYMPHOCYTES # BLD: 36 % (ref 24–43)
MCH RBC QN AUTO: 29.4 PG (ref 25.2–33.5)
MCHC RBC AUTO-ENTMCNC: 30.4 G/DL (ref 28.4–34.8)
MCV RBC AUTO: 96.5 FL (ref 82.6–102.9)
MONOCYTES # BLD: 8 % (ref 3–12)
NRBC AUTOMATED: 0 PER 100 WBC
PARTIAL THROMBOPLASTIN TIME: 34.5 SEC (ref 20.5–30.5)
PARTIAL THROMBOPLASTIN TIME: 38.6 SEC (ref 20.5–30.5)
PARTIAL THROMBOPLASTIN TIME: 55.9 SEC (ref 20.5–30.5)
PDW BLD-RTO: 13.4 % (ref 11.8–14.4)
PLATELET # BLD: 188 K/UL (ref 138–453)
PLATELET ESTIMATE: NORMAL
PMV BLD AUTO: 9.8 FL (ref 8.1–13.5)
POTASSIUM SERPL-SCNC: 4.2 MMOL/L (ref 3.7–5.3)
RBC # BLD: 4.29 M/UL (ref 3.95–5.11)
RBC # BLD: NORMAL 10*6/UL
SEG NEUTROPHILS: 53 % (ref 36–65)
SEGMENTED NEUTROPHILS ABSOLUTE COUNT: 4.49 K/UL (ref 1.5–8.1)
SODIUM BLD-SCNC: 139 MMOL/L (ref 135–144)
TROPONIN INTERP: NORMAL
TROPONIN T: <0.03 NG/ML
WBC # BLD: 8.5 K/UL (ref 3.5–11.3)
WBC # BLD: NORMAL 10*3/UL

## 2018-05-29 PROCEDURE — 6370000000 HC RX 637 (ALT 250 FOR IP): Performed by: INTERNAL MEDICINE

## 2018-05-29 PROCEDURE — 99232 SBSQ HOSP IP/OBS MODERATE 35: CPT | Performed by: INTERNAL MEDICINE

## 2018-05-29 PROCEDURE — 85025 COMPLETE CBC W/AUTO DIFF WBC: CPT

## 2018-05-29 PROCEDURE — 2060000000 HC ICU INTERMEDIATE R&B

## 2018-05-29 PROCEDURE — 2580000003 HC RX 258: Performed by: INTERNAL MEDICINE

## 2018-05-29 PROCEDURE — 78452 HT MUSCLE IMAGE SPECT MULT: CPT

## 2018-05-29 PROCEDURE — 6360000002 HC RX W HCPCS: Performed by: INTERNAL MEDICINE

## 2018-05-29 PROCEDURE — 93005 ELECTROCARDIOGRAM TRACING: CPT

## 2018-05-29 PROCEDURE — 6370000000 HC RX 637 (ALT 250 FOR IP): Performed by: STUDENT IN AN ORGANIZED HEALTH CARE EDUCATION/TRAINING PROGRAM

## 2018-05-29 PROCEDURE — 80048 BASIC METABOLIC PNL TOTAL CA: CPT

## 2018-05-29 PROCEDURE — A9500 TC99M SESTAMIBI: HCPCS | Performed by: INTERNAL MEDICINE

## 2018-05-29 PROCEDURE — 82947 ASSAY GLUCOSE BLOOD QUANT: CPT

## 2018-05-29 PROCEDURE — 85730 THROMBOPLASTIN TIME PARTIAL: CPT

## 2018-05-29 PROCEDURE — 93017 CV STRESS TEST TRACING ONLY: CPT | Performed by: NURSE PRACTITIONER

## 2018-05-29 PROCEDURE — 76937 US GUIDE VASCULAR ACCESS: CPT

## 2018-05-29 PROCEDURE — 3430000000 HC RX DIAGNOSTIC RADIOPHARMACEUTICAL: Performed by: INTERNAL MEDICINE

## 2018-05-29 PROCEDURE — 84484 ASSAY OF TROPONIN QUANT: CPT

## 2018-05-29 PROCEDURE — 36415 COLL VENOUS BLD VENIPUNCTURE: CPT

## 2018-05-29 RX ORDER — SODIUM CHLORIDE 0.9 % (FLUSH) 0.9 %
10 SYRINGE (ML) INJECTION PRN
Status: DISCONTINUED | OUTPATIENT
Start: 2018-05-29 | End: 2018-05-29

## 2018-05-29 RX ORDER — NITROGLYCERIN 0.4 MG/1
0.4 TABLET SUBLINGUAL EVERY 5 MIN PRN
Status: DISCONTINUED | OUTPATIENT
Start: 2018-05-29 | End: 2018-05-29

## 2018-05-29 RX ORDER — AMINOPHYLLINE DIHYDRATE 25 MG/ML
100 INJECTION, SOLUTION INTRAVENOUS
Status: COMPLETED | OUTPATIENT
Start: 2018-05-29 | End: 2018-05-29

## 2018-05-29 RX ORDER — NITROGLYCERIN 0.4 MG/1
TABLET SUBLINGUAL
Status: DISCONTINUED
Start: 2018-05-29 | End: 2018-05-29

## 2018-05-29 RX ORDER — METOPROLOL TARTRATE 5 MG/5ML
2.5 INJECTION INTRAVENOUS PRN
Status: DISCONTINUED | OUTPATIENT
Start: 2018-05-29 | End: 2018-05-29

## 2018-05-29 RX ORDER — SODIUM CHLORIDE 0.9 % (FLUSH) 0.9 %
10 SYRINGE (ML) INJECTION PRN
Status: DISCONTINUED | OUTPATIENT
Start: 2018-05-29 | End: 2018-05-30 | Stop reason: HOSPADM

## 2018-05-29 RX ORDER — ASPIRIN 81 MG/1
81 TABLET, CHEWABLE ORAL DAILY
Status: DISCONTINUED | OUTPATIENT
Start: 2018-05-29 | End: 2018-05-30 | Stop reason: HOSPADM

## 2018-05-29 RX ORDER — NITROGLYCERIN 0.4 MG/1
0.4 TABLET SUBLINGUAL EVERY 5 MIN PRN
Status: DISCONTINUED | OUTPATIENT
Start: 2018-05-29 | End: 2018-05-30 | Stop reason: HOSPADM

## 2018-05-29 RX ORDER — SODIUM CHLORIDE 9 MG/ML
INJECTION, SOLUTION INTRAVENOUS ONCE
Status: COMPLETED | OUTPATIENT
Start: 2018-05-29 | End: 2018-05-29

## 2018-05-29 RX ADMIN — Medication 10 ML: at 07:44

## 2018-05-29 RX ADMIN — NITROGLYCERIN 0.4 MG: 0.4 TABLET SUBLINGUAL at 07:42

## 2018-05-29 RX ADMIN — AMINOPHYLLINE 100 MG: 25 INJECTION, SOLUTION INTRAVENOUS at 13:11

## 2018-05-29 RX ADMIN — FUROSEMIDE 20 MG: 20 TABLET ORAL at 07:44

## 2018-05-29 RX ADMIN — ASPIRIN 81 MG: 81 TABLET, CHEWABLE ORAL at 09:26

## 2018-05-29 RX ADMIN — REGADENOSON 0.4 MG: 0.08 INJECTION, SOLUTION INTRAVENOUS at 13:10

## 2018-05-29 RX ADMIN — TETRAKIS(2-METHOXYISOBUTYLISOCYANIDE)COPPER(I) TETRAFLUOROBORATE 44 MILLICURIE: 1 INJECTION, POWDER, LYOPHILIZED, FOR SOLUTION INTRAVENOUS at 14:39

## 2018-05-29 RX ADMIN — Medication 10 ML: at 12:45

## 2018-05-29 RX ADMIN — METOPROLOL TARTRATE 25 MG: 25 TABLET ORAL at 21:27

## 2018-05-29 RX ADMIN — NITROGLYCERIN 0.4 MG: 0.4 TABLET SUBLINGUAL at 07:36

## 2018-05-29 RX ADMIN — SODIUM CHLORIDE, PRESERVATIVE FREE 10 ML: 5 INJECTION INTRAVENOUS at 14:39

## 2018-05-29 RX ADMIN — NITROGLYCERIN 0.4 MG: 0.4 TABLET SUBLINGUAL at 07:45

## 2018-05-29 RX ADMIN — SODIUM CHLORIDE, PRESERVATIVE FREE 10 ML: 5 INJECTION INTRAVENOUS at 13:08

## 2018-05-29 RX ADMIN — LOSARTAN POTASSIUM 50 MG: 50 TABLET, FILM COATED ORAL at 07:44

## 2018-05-29 RX ADMIN — METOPROLOL TARTRATE 25 MG: 25 TABLET ORAL at 07:44

## 2018-05-29 RX ADMIN — HEPARIN SODIUM 1710 UNITS: 1000 INJECTION, SOLUTION INTRAVENOUS; SUBCUTANEOUS at 03:09

## 2018-05-29 RX ADMIN — SODIUM CHLORIDE: 9 INJECTION, SOLUTION INTRAVENOUS at 12:45

## 2018-05-29 RX ADMIN — APIXABAN 5 MG: 5 TABLET, FILM COATED ORAL at 21:27

## 2018-05-29 RX ADMIN — TETRAKIS(2-METHOXYISOBUTYLISOCYANIDE)COPPER(I) TETRAFLUOROBORATE 13.4 MILLICURIE: 1 INJECTION, POWDER, LYOPHILIZED, FOR SOLUTION INTRAVENOUS at 13:08

## 2018-05-29 RX ADMIN — OXYBUTYNIN CHLORIDE 5 MG: 5 TABLET ORAL at 07:44

## 2018-05-29 RX ADMIN — AMITRIPTYLINE HYDROCHLORIDE 50 MG: 50 TABLET, FILM COATED ORAL at 21:27

## 2018-05-29 RX ADMIN — ATORVASTATIN CALCIUM 80 MG: 80 TABLET, FILM COATED ORAL at 21:27

## 2018-05-29 ASSESSMENT — PAIN SCALES - GENERAL
PAINLEVEL_OUTOF10: 6
PAINLEVEL_OUTOF10: 7
PAINLEVEL_OUTOF10: 0
PAINLEVEL_OUTOF10: 1
PAINLEVEL_OUTOF10: 8

## 2018-05-29 ASSESSMENT — PAIN DESCRIPTION - LOCATION
LOCATION: HEAD
LOCATION: CHEST
LOCATION: CHEST

## 2018-05-29 ASSESSMENT — PAIN DESCRIPTION - DESCRIPTORS
DESCRIPTORS: SPASM
DESCRIPTORS: OTHER (COMMENT)
DESCRIPTORS: DISCOMFORT

## 2018-05-29 ASSESSMENT — PAIN DESCRIPTION - ORIENTATION
ORIENTATION: MID
ORIENTATION: MID

## 2018-05-29 ASSESSMENT — PAIN DESCRIPTION - PAIN TYPE
TYPE: ACUTE PAIN

## 2018-05-30 VITALS
SYSTOLIC BLOOD PRESSURE: 134 MMHG | HEART RATE: 77 BPM | WEIGHT: 287.92 LBS | TEMPERATURE: 98.2 F | DIASTOLIC BLOOD PRESSURE: 74 MMHG | OXYGEN SATURATION: 100 % | BODY MASS INDEX: 47.97 KG/M2 | HEIGHT: 65 IN | RESPIRATION RATE: 18 BRPM

## 2018-05-30 LAB
ABSOLUTE EOS #: 0.23 K/UL (ref 0–0.44)
ABSOLUTE IMMATURE GRANULOCYTE: <0.03 K/UL (ref 0–0.3)
ABSOLUTE LYMPH #: 2.17 K/UL (ref 1.1–3.7)
ABSOLUTE MONO #: 0.53 K/UL (ref 0.1–1.2)
ANION GAP SERPL CALCULATED.3IONS-SCNC: 14 MMOL/L (ref 9–17)
BASOPHILS # BLD: 0 % (ref 0–2)
BASOPHILS ABSOLUTE: <0.03 K/UL (ref 0–0.2)
BUN BLDV-MCNC: 15 MG/DL (ref 8–23)
BUN/CREAT BLD: ABNORMAL (ref 9–20)
CALCIUM SERPL-MCNC: 8.6 MG/DL (ref 8.6–10.4)
CHLORIDE BLD-SCNC: 102 MMOL/L (ref 98–107)
CO2: 22 MMOL/L (ref 20–31)
CREAT SERPL-MCNC: 0.77 MG/DL (ref 0.5–0.9)
DIFFERENTIAL TYPE: ABNORMAL
EKG ATRIAL RATE: 256 BPM
EKG ATRIAL RATE: 96 BPM
EKG P AXIS: 66 DEGREES
EKG P-R INTERVAL: 176 MS
EKG Q-T INTERVAL: 268 MS
EKG Q-T INTERVAL: 392 MS
EKG QRS DURATION: 90 MS
EKG QRS DURATION: 96 MS
EKG QTC CALCULATION (BAZETT): 414 MS
EKG QTC CALCULATION (BAZETT): 495 MS
EKG R AXIS: -12 DEGREES
EKG R AXIS: -22 DEGREES
EKG T AXIS: 27 DEGREES
EKG T AXIS: 40 DEGREES
EKG VENTRICULAR RATE: 144 BPM
EKG VENTRICULAR RATE: 96 BPM
EOSINOPHILS RELATIVE PERCENT: 3 % (ref 1–4)
GFR AFRICAN AMERICAN: >60 ML/MIN
GFR NON-AFRICAN AMERICAN: >60 ML/MIN
GFR SERPL CREATININE-BSD FRML MDRD: ABNORMAL ML/MIN/{1.73_M2}
GFR SERPL CREATININE-BSD FRML MDRD: ABNORMAL ML/MIN/{1.73_M2}
GLUCOSE BLD-MCNC: 121 MG/DL (ref 70–99)
GLUCOSE BLD-MCNC: 129 MG/DL (ref 65–105)
GLUCOSE BLD-MCNC: 84 MG/DL (ref 65–105)
HCT VFR BLD CALC: 37.6 % (ref 36.3–47.1)
HEMOGLOBIN: 11.6 G/DL (ref 11.9–15.1)
IMMATURE GRANULOCYTES: 0 %
LV EF: 65 %
LVEF MODALITY: NORMAL
LYMPHOCYTES # BLD: 28 % (ref 24–43)
MAGNESIUM: 1.8 MG/DL (ref 1.6–2.6)
MCH RBC QN AUTO: 28.9 PG (ref 25.2–33.5)
MCHC RBC AUTO-ENTMCNC: 30.9 G/DL (ref 28.4–34.8)
MCV RBC AUTO: 93.5 FL (ref 82.6–102.9)
MONOCYTES # BLD: 7 % (ref 3–12)
NRBC AUTOMATED: 0 PER 100 WBC
PDW BLD-RTO: 13.2 % (ref 11.8–14.4)
PLATELET # BLD: 187 K/UL (ref 138–453)
PLATELET ESTIMATE: ABNORMAL
PMV BLD AUTO: 9.8 FL (ref 8.1–13.5)
POTASSIUM SERPL-SCNC: 3.9 MMOL/L (ref 3.7–5.3)
RBC # BLD: 4.02 M/UL (ref 3.95–5.11)
RBC # BLD: ABNORMAL 10*6/UL
SEG NEUTROPHILS: 62 % (ref 36–65)
SEGMENTED NEUTROPHILS ABSOLUTE COUNT: 4.67 K/UL (ref 1.5–8.1)
SODIUM BLD-SCNC: 138 MMOL/L (ref 135–144)
TROPONIN INTERP: NORMAL
TROPONIN T: <0.03 NG/ML
WBC # BLD: 7.6 K/UL (ref 3.5–11.3)
WBC # BLD: ABNORMAL 10*3/UL

## 2018-05-30 PROCEDURE — 93306 TTE W/DOPPLER COMPLETE: CPT

## 2018-05-30 PROCEDURE — 93005 ELECTROCARDIOGRAM TRACING: CPT

## 2018-05-30 PROCEDURE — 84484 ASSAY OF TROPONIN QUANT: CPT

## 2018-05-30 PROCEDURE — 85025 COMPLETE CBC W/AUTO DIFF WBC: CPT

## 2018-05-30 PROCEDURE — 6370000000 HC RX 637 (ALT 250 FOR IP): Performed by: INTERNAL MEDICINE

## 2018-05-30 PROCEDURE — 83735 ASSAY OF MAGNESIUM: CPT

## 2018-05-30 PROCEDURE — 80048 BASIC METABOLIC PNL TOTAL CA: CPT

## 2018-05-30 PROCEDURE — 99221 1ST HOSP IP/OBS SF/LOW 40: CPT | Performed by: INTERNAL MEDICINE

## 2018-05-30 PROCEDURE — 36415 COLL VENOUS BLD VENIPUNCTURE: CPT

## 2018-05-30 PROCEDURE — 99239 HOSP IP/OBS DSCHRG MGMT >30: CPT | Performed by: INTERNAL MEDICINE

## 2018-05-30 PROCEDURE — 82947 ASSAY GLUCOSE BLOOD QUANT: CPT

## 2018-05-30 RX ORDER — NITROGLYCERIN 0.4 MG/1
0.4 TABLET SUBLINGUAL EVERY 5 MIN PRN
Qty: 25 TABLET | Refills: 3 | Status: SHIPPED | OUTPATIENT
Start: 2018-05-30

## 2018-05-30 RX ORDER — ISOSORBIDE MONONITRATE 30 MG/1
30 TABLET, EXTENDED RELEASE ORAL DAILY
Status: DISCONTINUED | OUTPATIENT
Start: 2018-05-30 | End: 2018-05-30 | Stop reason: HOSPADM

## 2018-05-30 RX ORDER — PANTOPRAZOLE SODIUM 40 MG/1
40 TABLET, DELAYED RELEASE ORAL
Qty: 30 TABLET | Refills: 0 | Status: SHIPPED | OUTPATIENT
Start: 2018-05-31 | End: 2018-06-21 | Stop reason: SDUPTHER

## 2018-05-30 RX ORDER — ISOSORBIDE MONONITRATE 30 MG/1
30 TABLET, EXTENDED RELEASE ORAL DAILY
Qty: 30 TABLET | Refills: 3 | Status: SHIPPED | OUTPATIENT
Start: 2018-05-31 | End: 2018-06-21 | Stop reason: SDUPTHER

## 2018-05-30 RX ORDER — LOSARTAN POTASSIUM 25 MG/1
25 TABLET ORAL DAILY
Qty: 30 TABLET | Refills: 3 | Status: SHIPPED | OUTPATIENT
Start: 2018-05-31 | End: 2018-06-21 | Stop reason: SDUPTHER

## 2018-05-30 RX ORDER — PANTOPRAZOLE SODIUM 40 MG/1
40 TABLET, DELAYED RELEASE ORAL
Status: DISCONTINUED | OUTPATIENT
Start: 2018-05-30 | End: 2018-05-30 | Stop reason: HOSPADM

## 2018-05-30 RX ORDER — NITROGLYCERIN 0.4 MG/1
TABLET SUBLINGUAL
Qty: 25 TABLET | Refills: 3 | Status: SHIPPED | OUTPATIENT
Start: 2018-05-30 | End: 2018-05-30

## 2018-05-30 RX ORDER — LOSARTAN POTASSIUM 25 MG/1
25 TABLET ORAL DAILY
Status: DISCONTINUED | OUTPATIENT
Start: 2018-05-31 | End: 2018-05-30 | Stop reason: HOSPADM

## 2018-05-30 RX ADMIN — NITROGLYCERIN 0.4 MG: 0.4 TABLET SUBLINGUAL at 06:17

## 2018-05-30 RX ADMIN — NITROGLYCERIN 0.4 MG: 0.4 TABLET SUBLINGUAL at 06:26

## 2018-05-30 RX ADMIN — ISOSORBIDE MONONITRATE 30 MG: 30 TABLET ORAL at 14:03

## 2018-05-30 RX ADMIN — ASPIRIN 81 MG: 81 TABLET, CHEWABLE ORAL at 09:54

## 2018-05-30 RX ADMIN — OXYBUTYNIN CHLORIDE 5 MG: 5 TABLET ORAL at 09:53

## 2018-05-30 RX ADMIN — METOPROLOL TARTRATE 25 MG: 25 TABLET ORAL at 09:53

## 2018-05-30 RX ADMIN — PANTOPRAZOLE SODIUM 40 MG: 40 TABLET, DELAYED RELEASE ORAL at 09:54

## 2018-05-30 RX ADMIN — FUROSEMIDE 20 MG: 20 TABLET ORAL at 09:53

## 2018-05-30 ASSESSMENT — PAIN SCALES - GENERAL
PAINLEVEL_OUTOF10: 0
PAINLEVEL_OUTOF10: 0

## 2018-05-31 ENCOUNTER — CARE COORDINATION (OUTPATIENT)
Dept: CASE MANAGEMENT | Age: 68
End: 2018-05-31

## 2018-05-31 ENCOUNTER — TELEPHONE (OUTPATIENT)
Dept: INTERNAL MEDICINE | Age: 68
End: 2018-05-31

## 2018-05-31 NOTE — TELEPHONE ENCOUNTER
PLease call patient for f/u hosptial stay appt with Dr. Samreen Azevedo, offered Dr. Leon Manuel, patient only wants to see Samreen Azevedo, discharged [facility], please call pt at [phone]

## 2018-06-05 ENCOUNTER — CARE COORDINATION (OUTPATIENT)
Dept: CASE MANAGEMENT | Age: 68
End: 2018-06-05

## 2018-06-07 ENCOUNTER — OFFICE VISIT (OUTPATIENT)
Dept: INTERNAL MEDICINE | Age: 68
End: 2018-06-07
Payer: MEDICARE

## 2018-06-07 VITALS
SYSTOLIC BLOOD PRESSURE: 130 MMHG | RESPIRATION RATE: 12 BRPM | OXYGEN SATURATION: 99 % | DIASTOLIC BLOOD PRESSURE: 80 MMHG | HEART RATE: 67 BPM | WEIGHT: 291.4 LBS | BODY MASS INDEX: 48.55 KG/M2 | HEIGHT: 65 IN

## 2018-06-07 DIAGNOSIS — E66.01 MORBID OBESITY DUE TO EXCESS CALORIES (HCC): ICD-10-CM

## 2018-06-07 DIAGNOSIS — I10 ESSENTIAL HYPERTENSION: ICD-10-CM

## 2018-06-07 DIAGNOSIS — I48.91 ATRIAL FIBRILLATION WITH RVR (HCC): ICD-10-CM

## 2018-06-07 DIAGNOSIS — F51.01 PRIMARY INSOMNIA: ICD-10-CM

## 2018-06-07 DIAGNOSIS — Z13.220 SCREENING FOR HYPERLIPIDEMIA: Primary | ICD-10-CM

## 2018-06-07 DIAGNOSIS — E11.42 TYPE 2 DIABETES MELLITUS WITH DIABETIC POLYNEUROPATHY, WITHOUT LONG-TERM CURRENT USE OF INSULIN (HCC): ICD-10-CM

## 2018-06-07 DIAGNOSIS — L30.9 ECZEMA, UNSPECIFIED TYPE: ICD-10-CM

## 2018-06-07 LAB — HBA1C MFR BLD: 6.8 %

## 2018-06-07 PROCEDURE — 83036 HEMOGLOBIN GLYCOSYLATED A1C: CPT | Performed by: INTERNAL MEDICINE

## 2018-06-07 PROCEDURE — 99495 TRANSJ CARE MGMT MOD F2F 14D: CPT | Performed by: INTERNAL MEDICINE

## 2018-06-07 RX ORDER — AMITRIPTYLINE HYDROCHLORIDE 50 MG/1
50 TABLET, FILM COATED ORAL NIGHTLY
Qty: 30 TABLET | Refills: 3 | Status: SHIPPED | OUTPATIENT
Start: 2018-06-07 | End: 2018-06-21 | Stop reason: SDUPTHER

## 2018-06-07 ASSESSMENT — ENCOUNTER SYMPTOMS
EYES NEGATIVE: 1
ALLERGIC/IMMUNOLOGIC NEGATIVE: 1
SHORTNESS OF BREATH: 1
GASTROINTESTINAL NEGATIVE: 1

## 2018-06-08 ENCOUNTER — CARE COORDINATION (OUTPATIENT)
Dept: CASE MANAGEMENT | Age: 68
End: 2018-06-08

## 2018-06-12 ENCOUNTER — CARE COORDINATION (OUTPATIENT)
Dept: CARE COORDINATION | Age: 68
End: 2018-06-12

## 2018-06-21 DIAGNOSIS — F51.01 PRIMARY INSOMNIA: ICD-10-CM

## 2018-06-21 RX ORDER — ISOSORBIDE MONONITRATE 30 MG/1
30 TABLET, EXTENDED RELEASE ORAL DAILY
Qty: 30 TABLET | Refills: 3 | Status: SHIPPED | OUTPATIENT
Start: 2018-06-21 | End: 2018-07-26 | Stop reason: ALTCHOICE

## 2018-06-21 RX ORDER — LOSARTAN POTASSIUM 25 MG/1
25 TABLET ORAL DAILY
Qty: 30 TABLET | Refills: 3 | Status: SHIPPED | OUTPATIENT
Start: 2018-06-21 | End: 2018-08-28 | Stop reason: SDUPTHER

## 2018-06-21 RX ORDER — FUROSEMIDE 20 MG/1
20 TABLET ORAL DAILY
Qty: 60 TABLET | Refills: 3 | Status: SHIPPED | OUTPATIENT
Start: 2018-06-21 | End: 2019-05-30 | Stop reason: SDUPTHER

## 2018-06-21 RX ORDER — PANTOPRAZOLE SODIUM 40 MG/1
40 TABLET, DELAYED RELEASE ORAL
Qty: 30 TABLET | Refills: 0 | Status: SHIPPED | OUTPATIENT
Start: 2018-06-21 | End: 2018-07-19 | Stop reason: SDUPTHER

## 2018-06-21 RX ORDER — OXYBUTYNIN CHLORIDE 5 MG/1
TABLET ORAL
Qty: 90 TABLET | Refills: 5 | Status: SHIPPED | OUTPATIENT
Start: 2018-06-21 | End: 2019-05-30 | Stop reason: SDUPTHER

## 2018-06-21 RX ORDER — AMITRIPTYLINE HYDROCHLORIDE 50 MG/1
50 TABLET, FILM COATED ORAL NIGHTLY
Qty: 30 TABLET | Refills: 3 | Status: SHIPPED | OUTPATIENT
Start: 2018-06-21 | End: 2018-08-28 | Stop reason: SDUPTHER

## 2018-07-17 ENCOUNTER — TELEPHONE (OUTPATIENT)
Dept: INTERNAL MEDICINE | Age: 68
End: 2018-07-17

## 2018-07-19 NOTE — TELEPHONE ENCOUNTER
coronary artery of native heart without angina pectoris     Meningioma (HCC)     Chronic diastolic congestive heart failure (HCC)     Chest pain     First degree AV block     Chronic daily headache     Atrial fibrillation with RVR (HCC)     Eczema

## 2018-07-20 RX ORDER — PANTOPRAZOLE SODIUM 40 MG/1
40 TABLET, DELAYED RELEASE ORAL
Qty: 30 TABLET | Refills: 3 | Status: SHIPPED | OUTPATIENT
Start: 2018-07-20 | End: 2019-01-24 | Stop reason: SDUPTHER

## 2018-07-26 ENCOUNTER — OFFICE VISIT (OUTPATIENT)
Dept: NEUROLOGY | Age: 68
End: 2018-07-26
Payer: MEDICARE

## 2018-07-26 VITALS
HEART RATE: 79 BPM | BODY MASS INDEX: 48.42 KG/M2 | WEIGHT: 290.6 LBS | SYSTOLIC BLOOD PRESSURE: 135 MMHG | DIASTOLIC BLOOD PRESSURE: 82 MMHG | HEIGHT: 65 IN

## 2018-07-26 DIAGNOSIS — G47.33 OBSTRUCTIVE SLEEP APNEA SYNDROME: Primary | ICD-10-CM

## 2018-07-26 DIAGNOSIS — G43.009 MIGRAINE WITHOUT AURA AND WITHOUT STATUS MIGRAINOSUS, NOT INTRACTABLE: ICD-10-CM

## 2018-07-26 PROCEDURE — 1123F ACP DISCUSS/DSCN MKR DOCD: CPT | Performed by: PSYCHIATRY & NEUROLOGY

## 2018-07-26 PROCEDURE — 4040F PNEUMOC VAC/ADMIN/RCVD: CPT | Performed by: PSYCHIATRY & NEUROLOGY

## 2018-07-26 PROCEDURE — 1036F TOBACCO NON-USER: CPT | Performed by: PSYCHIATRY & NEUROLOGY

## 2018-07-26 PROCEDURE — 99214 OFFICE O/P EST MOD 30 MIN: CPT | Performed by: PSYCHIATRY & NEUROLOGY

## 2018-07-26 PROCEDURE — 3017F COLORECTAL CA SCREEN DOC REV: CPT | Performed by: PSYCHIATRY & NEUROLOGY

## 2018-07-26 PROCEDURE — 1090F PRES/ABSN URINE INCON ASSESS: CPT | Performed by: PSYCHIATRY & NEUROLOGY

## 2018-07-26 PROCEDURE — 1101F PT FALLS ASSESS-DOCD LE1/YR: CPT | Performed by: PSYCHIATRY & NEUROLOGY

## 2018-07-26 PROCEDURE — G8417 CALC BMI ABV UP PARAM F/U: HCPCS | Performed by: PSYCHIATRY & NEUROLOGY

## 2018-07-26 PROCEDURE — G8598 ASA/ANTIPLAT THER USED: HCPCS | Performed by: PSYCHIATRY & NEUROLOGY

## 2018-07-26 PROCEDURE — G8427 DOCREV CUR MEDS BY ELIG CLIN: HCPCS | Performed by: PSYCHIATRY & NEUROLOGY

## 2018-07-26 PROCEDURE — G8399 PT W/DXA RESULTS DOCUMENT: HCPCS | Performed by: PSYCHIATRY & NEUROLOGY

## 2018-07-26 ASSESSMENT — ENCOUNTER SYMPTOMS
EYES NEGATIVE: 1
GASTROINTESTINAL NEGATIVE: 1
RESPIRATORY NEGATIVE: 1
ALLERGIC/IMMUNOLOGIC NEGATIVE: 1

## 2018-07-26 NOTE — PROGRESS NOTES
Subjective:      Patient ID: Giorgio Palmer is a 75 y.o. female. HPI    Active problem sleep apnea to undergo split night study to get new nasal CPAP machine along with migraine headaches  . Syncope  . The condition she is using nasal CPAP on nightly basi . She is going to bed from 9 PM to 12 AM falling asleep within 15 to 45 minutes sleeping to 8 to 9 AM . She is having one awakening able to fall back asleep . She is tolerating pressure well with good mask fit . She feels better the next day taking a short routine nap. She is having no headaches at this time remaining on elavil . She denies focal motor ,sensory or bulbar complaint . There is obesity BMI 48. She had syncopal episode in [address] [place] [address] seeing Dr Yuriy Gentile to have tilt table . Significant medications elavil 50 mg po qhs , fioricet PRN , plavix 75 mg po qd , aspirin 81 mg po qd. Testing MRI of Head with 7 mm left frontal meningioma , April 2017. Head CT with small left frontal extraxial mass concordant with meningioma . Carotid ultrasound 1-15 % stenosis bilaterally . Cardiac 2 D echo hyperdynamic LVF EF > 65 %. Polysomnogram apnea hypopnea index 53 episodes per hour with oxyhemoglobin desaturation to 64 % , September 2017 . Polysomnogram apnea hypopnea index 48 episodes per hour with oxyhemoglobin desaturation to 65 %, March 2018  .  CPAP 14 cm H20      Past Medical History:   Diagnosis Date    HOSSEIN (acute kidney injury) ([address] 3/3/2017    Arthritis     shoulders    Chronic diastolic congestive heart failure ([address] 3/3/2017    Chronic kidney disease     Coronary artery disease involving native coronary artery of native heart without angina pectoris 10/13/2016    GERD (gastroesophageal reflux disease)     Headache     Hyperlipidemia     Hypertension     MI (myocardial infarction) ([address]     Neuromuscular disorder ([address]     Osteoarthritis     Patient in clinical research study 03/06/2017    AbsorbIV    Sleep apnea     Snores     Type II or 6/7/18-6/6/23  AFIB 1 each 0    nitroGLYCERIN (NITROSTAT) 0.4 MG SL tablet Place 1 tablet under the tongue every 5 minutes as needed for Chest pain up to max of 3 total doses. If no relief after 1 dose, call 911. 25 tablet 3    atorvastatin (LIPITOR) 80 MG tablet Take 1 tablet by mouth nightly 30 tablet 3    glucose blood VI test strips (ONETOUCH VERIO) strip 1 each by In Vitro route daily Daily. She has a OneTouch Verio machine. Needs compatible test strips. 50 each 3    acetaminophen (TYLENOL) 325 MG tablet Take 1 tablet by mouth every 6 hours as needed for Pain 90 tablet 0    Respiratory Therapy Supplies AMANDEEP Lower nasal CPAP pressure 14 cm H20 1 Device 0    docusate sodium (COLACE) 100 MG capsule Take 100 mg by mouth daily as needed for Constipation      magnesium hydroxide (MILK OF MAGNESIA) 400 MG/5ML suspension Take 5 mLs by mouth daily as needed for Constipation       No current facility-administered medications for this visit. Allergies   Allergen Reactions    Lisinopril Itching     Inflammation, burning, itching    Pcn [Penicillins] Hives       Review of Systems   Constitutional: Negative. HENT: Negative. Eyes: Negative. Respiratory: Negative. Cardiovascular: Negative. Gastrointestinal: Negative. Endocrine: Negative. Genitourinary: Negative. Musculoskeletal: Negative. Skin: Negative. Allergic/Immunologic: Negative. Neurological: Negative. Hematological: Negative. Psychiatric/Behavioral: Negative. Objective:   Physical Exam    Vitals:    07/26/18 1410   BP: 135/82   Pulse: 79     weight: 290 lb 9.6 oz (131.8 kg)    Neurological Examination  Constitutional .General exam well groomed   Ears /Nose/Throat: external ear . Normal exam  Neck and thyroid . Normal size  Cardiovascular:  Auscultation of heart with regular rate and rhythm   Musculoskeletal. Muscle bulk and tone normal                                                           Muscle strength 5/5 strength throughout                                                                                 No dysmetria or dysdiadokinesis  No tremor   Normal fine motor  Orientation Alert and oriented x 3   Attention and concentration normal   Short term memory normal   Language process and speech normal . No aphasia   Cranial nerve 2 normal acuety and visual fields  Cranial nerve 3, 4 and 6 . Extraocular muscles are intact . Pupils are equal and reactive   Cranial nerve 5 . Intact corneal reflex. Normal facial sensation  Cranial nerve 7 normal exam   Cranial nerve 8. Grossly intact hearing   Cranial nerve 9 and 10. Symmetric palate elevation   Cranial nerve 11 , 5 out of 5 strength   Cranial Nerve 12 midline tongue . No atrophy  Sensation .  pinprick and light touch in stocking distribution   Deep Tendon Reflexes absent ankle jerks   Plantar response flexor bilaterally     Assessment:      1. Obstructive sleep apnea syndrome    2. Migraine without aura and without status migrainosus, not intractable    She has been able to tolerate nasal CPAP quite well being rested the next day .  She is to continue current regimen     Plan:      As above

## 2018-08-08 NOTE — COMMUNICATION BODY
unspecified type diabetes mellitus without mention of complication, not stated as uncontrolled     Use of cane as ambulatory aid     Wears contact lenses     LEFT EYE ONLY       Past Surgical History:   Procedure Laterality Date    CARDIAC SURGERY      2 stents  placed 2015    CORONARY ANGIOPLASTY WITH STENT PLACEMENT      HYSTERECTOMY  30 years     JOINT REPLACEMENT Left 03/2016    bilat. knees    KNEE ARTHROSCOPY Left     TOTAL KNEE ARTHROPLASTY Right 07/21/2016       Family History   Problem Relation Age of Onset    Stroke Mother     Heart Disease Mother     Heart Disease Father     Other Sister         migraines    Heart Disease Brother        Social History     Social History    Marital status: Single     Spouse name: N/A    Number of children: N/A    Years of education: N/A     Social History Main Topics    Smoking status: Never Smoker    Smokeless tobacco: Never Used    Alcohol use No    Drug use: No    Sexual activity: Not Asked     Other Topics Concern    None     Social History Narrative    None       Current Outpatient Prescriptions   Medication Sig Dispense Refill    pantoprazole (PROTONIX) 40 MG tablet TAKE 1 TABLET BY MOUTH EVERY MORNING (BEFORE BREAKFAST) 30 tablet 3    amitriptyline (ELAVIL) 50 MG tablet Take 1 tablet by mouth nightly 30 tablet 3    apixaban (ELIQUIS) 5 MG TABS tablet Take 1 tablet by mouth 2 times daily 60 tablet 2    aspirin 81 MG tablet Take 1 tablet by mouth daily 30 tablet 3    furosemide (LASIX) 20 MG tablet Take 1 tablet by mouth daily 60 tablet 3    oxybutynin (DITROPAN) 5 MG tablet TAKE 1 TABLET EVERY DAY 90 tablet 5    losartan (COZAAR) 25 MG tablet Take 1 tablet by mouth daily 30 tablet 3    metoprolol tartrate (LOPRESSOR) 25 MG tablet Take 1 tablet by mouth 2 times daily 60 tablet 3    metFORMIN (GLUCOPHAGE) 1000 MG tablet Take 1 tablet by mouth 2 times daily (with meals) 60 tablet 3    Handicap Placard MISC by Does not apply route Dx: 5/5 strength throughout                                                                                 No dysmetria or dysdiadokinesis  No tremor   Normal fine motor  Orientation Alert and oriented x 3   Attention and concentration normal   Short term memory normal   Language process and speech normal . No aphasia   Cranial nerve 2 normal acuety and visual fields  Cranial nerve 3, 4 and 6 . Extraocular muscles are intact . Pupils are equal and reactive   Cranial nerve 5 . Intact corneal reflex. Normal facial sensation  Cranial nerve 7 normal exam   Cranial nerve 8. Grossly intact hearing   Cranial nerve 9 and 10. Symmetric palate elevation   Cranial nerve 11 , 5 out of 5 strength   Cranial Nerve 12 midline tongue . No atrophy  Sensation .  pinprick and light touch in stocking distribution   Deep Tendon Reflexes absent ankle jerks   Plantar response flexor bilaterally     Assessment:      1. Obstructive sleep apnea syndrome    2. Migraine without aura and without status migrainosus, not intractable    She has been able to tolerate nasal CPAP quite well being rested the next day .  She is to continue current regimen     Plan:      As above

## 2018-08-09 ENCOUNTER — CARE COORDINATION (OUTPATIENT)
Dept: CARE COORDINATION | Age: 68
End: 2018-08-09

## 2018-08-15 ENCOUNTER — HOSPITAL ENCOUNTER (OUTPATIENT)
Dept: CARDIAC CATH/INVASIVE PROCEDURES | Age: 68
Discharge: HOME HEALTH | End: 2018-08-15
Attending: INTERNAL MEDICINE | Admitting: INTERNAL MEDICINE
Payer: MEDICARE

## 2018-08-15 PROCEDURE — 6360000002 HC RX W HCPCS

## 2018-08-15 PROCEDURE — 93660 TILT TABLE EVALUATION: CPT | Performed by: INTERNAL MEDICINE

## 2018-08-15 NOTE — H&P
She has never used smokeless tobacco. She reports that she does not drink alcohol or use drugs. Family History:   Positive for early CAD    REVIEW OF SYSTEMS:    · Constitutional: there has been no unanticipated weight loss. There's been No change in energy level, No change in activity level. · Eyes: No visual changes or diplopia. No scleral icterus. · ENT: No Headaches, hearing loss or vertigo. No mouth sores or sore throat. · Cardiovascular: No problem  · Respiratory: No previous reported problems  · Gastrointestinal: No abdominal pain, appetite loss, blood in stools. No change in bowel or bladder habits. · Genitourinary: No dysuria, trouble voiding, or hematuria. · Musculoskeletal:  No gait disturbance, No weakness or joint complaints. · Integumentary: No rash or pruritis. · Neurological: No headache, diplopia, change in muscle strength, numbness or tingling. No change in gait, balance, coordination, mood, affect, memory, mentation, behavior. · Psychiatric: No anxiety, or depression. · Endocrine: No temperature intolerance. No excessive thirst, fluid intake, or urination. No tremor. · Hematologic/Lymphatic: No abnormal bruising or bleeding, blood clots or swollen lymph nodes. · Allergic/Immunologic: No nasal congestion or hives. PHYSICAL EXAM:    Physical Examination:    There were no vitals taken for this visit. Constitutional and General Appearance: alert, cooperative, no distress and appears stated age  [de-identified]: PERRL, no cervical lymphadenopathy. No masses palpable. Normal oral mucosa  Respiratory:  · Normal excursion and expansion without use of accessory muscles  · Resp Auscultation: Good respiratory effort. No for increased work of breathing.  On auscultation: clear to auscultation bilaterally  Cardiovascular:  · The apical impulse is not displaced  · Heart tones are crisp and normal. regular S1 and S2. Murmurs:  None  · Jugular venous pulsation Normal  · The carotid upstroke is normal in amplitude and contour without delay or bruit  · Peripheral pulses are symmetrical and full   Abdomen:  · No masses or tenderness  · Bowel sounds present  Extremities:  ·  No Cyanosis or Clubbing  ·  Lower extremity edema: Yes  ·  Skin: Warm and dry  Neurological:  · Alert and oriented. · Moves all extremities well  · No abnormalities of mood, affect, memory, mentation, or behavior are noted    DATA:    Diagnostics:      EKG:  Sinus rhythm; WNL      Labs:     CBC: No results for input(s): WBC, HGB, HCT, PLT in the last 72 hours. BMP: No results for input(s): NA, K, CO2, BUN, CREATININE, LABGLOM, GLUCOSE in the last 72 hours. BNP: No results for input(s): BNP in the last 72 hours. PT/INR: No results for input(s): PROTIME, INR in the last 72 hours. APTT:No results for input(s): APTT in the last 72 hours. CARDIAC ENZYMES:No results for input(s): CKTOTAL, CKMB, CKMBINDEX, TROPONINI in the last 72 hours. FASTING LIPID PANEL:  Lab Results   Component Value Date    HDL 44 03/03/2017    TRIG 116 03/03/2017     LIVER PROFILE:No results for input(s): AST, ALT, LABALBU in the last 72 hours. IMPRESSION:    1. Vasovagal syncope  2. Essential HTN with preserved LVEF  3. Type II DM  4. Paroxysmal AF  5. Obstructive sleep apnea    Patient Active Problem List   Diagnosis    Essential hypertension    Obstructive sleep apnea syndrome    Morbid obesity due to excess calories (HCC)    Arthritis of knee    Cardiac murmur    Type 2 diabetes mellitus with diabetic polyneuropathy, without long-term current use of insulin (HCC)    Status post right knee replacement    Coronary artery disease involving native coronary artery of native heart without angina pectoris    Meningioma (HCC)    Chronic diastolic congestive heart failure (HCC)    Chest pain    First degree AV block    Chronic daily headache    Atrial fibrillation with RVR (HCC)    Eczema       RECOMMENDATIONS:  1. Tilt table testing today  2.   Continue

## 2018-08-16 PROCEDURE — 6360000002 HC RX W HCPCS

## 2018-08-28 DIAGNOSIS — F51.01 PRIMARY INSOMNIA: ICD-10-CM

## 2018-08-29 NOTE — TELEPHONE ENCOUNTER
replacement     Coronary artery disease involving native coronary artery of native heart without angina pectoris     Meningioma (HCC)     Chronic diastolic congestive heart failure (HCC)     Chest pain     First degree AV block     Chronic daily headache     Atrial fibrillation with RVR (HCC)     Eczema

## 2018-08-30 RX ORDER — AMITRIPTYLINE HYDROCHLORIDE 50 MG/1
TABLET, FILM COATED ORAL
Qty: 90 TABLET | Refills: 3 | Status: SHIPPED | OUTPATIENT
Start: 2018-08-30 | End: 2019-05-30 | Stop reason: SDUPTHER

## 2018-08-30 RX ORDER — ISOSORBIDE MONONITRATE 30 MG/1
TABLET, EXTENDED RELEASE ORAL
Qty: 90 TABLET | Refills: 3 | Status: ON HOLD | OUTPATIENT
Start: 2018-08-30 | End: 2018-10-28 | Stop reason: ALTCHOICE

## 2018-08-30 RX ORDER — APIXABAN 5 MG/1
TABLET, FILM COATED ORAL
Qty: 180 TABLET | Refills: 3 | Status: ON HOLD | OUTPATIENT
Start: 2018-08-30 | End: 2019-04-08 | Stop reason: HOSPADM

## 2018-08-30 RX ORDER — LOSARTAN POTASSIUM 25 MG/1
TABLET ORAL
Qty: 90 TABLET | Refills: 3 | Status: ON HOLD | OUTPATIENT
Start: 2018-08-30 | End: 2019-02-11 | Stop reason: HOSPADM

## 2018-09-06 ENCOUNTER — TELEPHONE (OUTPATIENT)
Dept: INTERNAL MEDICINE | Age: 68
End: 2018-09-06

## 2018-10-08 ENCOUNTER — CARE COORDINATION (OUTPATIENT)
Dept: CARE COORDINATION | Age: 68
End: 2018-10-08

## 2018-10-08 NOTE — CARE COORDINATION
Ambulatory Care Coordination Note  10/8/2018  CM Risk Score: 6  Bhavna Mortality Risk Score: 6    ACC: Willis Bosch RN    Summary Note: Called and spoke with Jose Spencens. She stated \"I'm doing ok. Just taking one day at a time\". Asked about her tilt table test. She said \"it was horrible, but the doctor said everything looked good\". She was encouraged to drink 40 ounces of Powerade Zero daily. She said that it was too much liquid for her. She thinks she has a follow up with the cardiologist in January. She stated that she has only had one episode since then, about 3 weeks ago. She said her brother passed away suddenly and she was at the [employer] and she felt like everything was turning black. She had a friend with her and she sat down outside and it passed after a few minutes. She thinks it was probably from all the stress at the time. Her blood sugars have been doing good- 97, 112, 134, 139. She has an appt coming up next week with PCP. Will meet with her at that time. If stable, and no needs will recommend for graduation. Care Coordination Interventions    Program Enrollment:  Complex Care  Referral from Primary Care Provider:  No  Suggested Interventions and Community Resources  Diabetes Education:  Completed  Transportation Support:  Completed  Zone Management Tools:  Completed         Goals Addressed             Most Recent       Patient Stated     COMPLETED: Blood pressure normal (pt-stated)   On track (8/9/2018)             Care Coordination Goal:  Patient Self-Monitoring  Choose a Goal:  BG monitoring: Yes     Fasting blood sugar  I will notify my provider of any trends of increasing or decreasing blood sugars over 1 month period of time. Daily weight:   Yes  I will notify my provider of any increase in weight by 3 or more pounds in 2 days.        Blood pressure:  Yes - Daily  I will notify my provider of any trends of increasing or decreasing blood pressures over a month period of

## 2018-10-15 ENCOUNTER — TELEPHONE (OUTPATIENT)
Dept: INTERNAL MEDICINE | Age: 68
End: 2018-10-15

## 2018-10-15 NOTE — TELEPHONE ENCOUNTER
PC to pt for no show appt. Spoke to pt and she was thinking that her appt was on Wednesday. Rescheduled appt for 11/8/18.

## 2018-10-27 ENCOUNTER — HOSPITAL ENCOUNTER (OUTPATIENT)
Age: 68
Setting detail: OBSERVATION
Discharge: HOME | End: 2018-10-28
Attending: EMERGENCY MEDICINE | Admitting: EMERGENCY MEDICINE
Payer: MEDICARE

## 2018-10-27 DIAGNOSIS — R07.9 CHEST PAIN, UNSPECIFIED TYPE: Primary | ICD-10-CM

## 2018-10-27 PROCEDURE — 99285 EMERGENCY DEPT VISIT HI MDM: CPT

## 2018-10-28 ENCOUNTER — APPOINTMENT (OUTPATIENT)
Dept: GENERAL RADIOLOGY | Age: 68
End: 2018-10-28
Payer: MEDICARE

## 2018-10-28 VITALS
HEIGHT: 65 IN | SYSTOLIC BLOOD PRESSURE: 147 MMHG | RESPIRATION RATE: 20 BRPM | TEMPERATURE: 98 F | DIASTOLIC BLOOD PRESSURE: 116 MMHG | BODY MASS INDEX: 48.32 KG/M2 | WEIGHT: 290 LBS | OXYGEN SATURATION: 99 % | HEART RATE: 77 BPM

## 2018-10-28 LAB
-: NORMAL
-: NORMAL
ABSOLUTE EOS #: 0.13 K/UL (ref 0–0.44)
ABSOLUTE IMMATURE GRANULOCYTE: <0.03 K/UL (ref 0–0.3)
ABSOLUTE LYMPH #: 2.77 K/UL (ref 1.1–3.7)
ABSOLUTE MONO #: 0.74 K/UL (ref 0.1–1.2)
ANION GAP SERPL CALCULATED.3IONS-SCNC: 15 MMOL/L (ref 9–17)
BASOPHILS # BLD: 0 % (ref 0–2)
BASOPHILS ABSOLUTE: 0.03 K/UL (ref 0–0.2)
BNP INTERPRETATION: NORMAL
BUN BLDV-MCNC: 16 MG/DL (ref 8–23)
BUN/CREAT BLD: ABNORMAL (ref 9–20)
CALCIUM SERPL-MCNC: 9.2 MG/DL (ref 8.6–10.4)
CHLORIDE BLD-SCNC: 103 MMOL/L (ref 98–107)
CO2: 24 MMOL/L (ref 20–31)
CREAT SERPL-MCNC: 0.88 MG/DL (ref 0.5–0.9)
DIFFERENTIAL TYPE: ABNORMAL
EOSINOPHILS RELATIVE PERCENT: 2 % (ref 1–4)
GFR AFRICAN AMERICAN: >60 ML/MIN
GFR NON-AFRICAN AMERICAN: >60 ML/MIN
GFR SERPL CREATININE-BSD FRML MDRD: ABNORMAL ML/MIN/{1.73_M2}
GFR SERPL CREATININE-BSD FRML MDRD: ABNORMAL ML/MIN/{1.73_M2}
GLUCOSE BLD-MCNC: 109 MG/DL (ref 65–105)
GLUCOSE BLD-MCNC: 127 MG/DL (ref 70–99)
HCT VFR BLD CALC: 42 % (ref 36.3–47.1)
HEMOGLOBIN: 13.1 G/DL (ref 11.9–15.1)
IMMATURE GRANULOCYTES: 0 %
LYMPHOCYTES # BLD: 31 % (ref 24–43)
MCH RBC QN AUTO: 28.5 PG (ref 25.2–33.5)
MCHC RBC AUTO-ENTMCNC: 31.2 G/DL (ref 28.4–34.8)
MCV RBC AUTO: 91.5 FL (ref 82.6–102.9)
MONOCYTES # BLD: 8 % (ref 3–12)
NRBC AUTOMATED: 0 PER 100 WBC
PDW BLD-RTO: 14.7 % (ref 11.8–14.4)
PLATELET # BLD: 176 K/UL (ref 138–453)
PLATELET ESTIMATE: ABNORMAL
PMV BLD AUTO: 11.4 FL (ref 8.1–13.5)
POC TROPONIN I: 0.01 NG/ML (ref 0–0.1)
POC TROPONIN I: 0.01 NG/ML (ref 0–0.1)
POC TROPONIN INTERP: NORMAL
POC TROPONIN INTERP: NORMAL
POTASSIUM SERPL-SCNC: 4.3 MMOL/L (ref 3.7–5.3)
PRO-BNP: 66 PG/ML
RBC # BLD: 4.59 M/UL (ref 3.95–5.11)
RBC # BLD: ABNORMAL 10*6/UL
REASON FOR REJECTION: NORMAL
REASON FOR REJECTION: NORMAL
SEG NEUTROPHILS: 59 % (ref 36–65)
SEGMENTED NEUTROPHILS ABSOLUTE COUNT: 5.14 K/UL (ref 1.5–8.1)
SODIUM BLD-SCNC: 142 MMOL/L (ref 135–144)
WBC # BLD: 8.8 K/UL (ref 3.5–11.3)
WBC # BLD: ABNORMAL 10*3/UL
ZZ NTE CLEAN UP: ORDERED TEST: NORMAL
ZZ NTE CLEAN UP: ORDERED TEST: NORMAL
ZZ NTE WITH NAME CLEAN UP: SPECIMEN SOURCE: NORMAL
ZZ NTE WITH NAME CLEAN UP: SPECIMEN SOURCE: NORMAL

## 2018-10-28 PROCEDURE — 84484 ASSAY OF TROPONIN QUANT: CPT

## 2018-10-28 PROCEDURE — G0378 HOSPITAL OBSERVATION PER HR: HCPCS

## 2018-10-28 PROCEDURE — 71045 X-RAY EXAM CHEST 1 VIEW: CPT

## 2018-10-28 PROCEDURE — 85025 COMPLETE CBC W/AUTO DIFF WBC: CPT

## 2018-10-28 PROCEDURE — 6370000000 HC RX 637 (ALT 250 FOR IP): Performed by: STUDENT IN AN ORGANIZED HEALTH CARE EDUCATION/TRAINING PROGRAM

## 2018-10-28 PROCEDURE — 2580000003 HC RX 258: Performed by: STUDENT IN AN ORGANIZED HEALTH CARE EDUCATION/TRAINING PROGRAM

## 2018-10-28 PROCEDURE — 82947 ASSAY GLUCOSE BLOOD QUANT: CPT

## 2018-10-28 PROCEDURE — 80048 BASIC METABOLIC PNL TOTAL CA: CPT

## 2018-10-28 PROCEDURE — 93005 ELECTROCARDIOGRAM TRACING: CPT

## 2018-10-28 PROCEDURE — 83880 ASSAY OF NATRIURETIC PEPTIDE: CPT

## 2018-10-28 RX ORDER — IBUPROFEN 800 MG/1
800 TABLET ORAL ONCE
Status: COMPLETED | OUTPATIENT
Start: 2018-10-28 | End: 2018-10-28

## 2018-10-28 RX ORDER — SODIUM CHLORIDE 0.9 % (FLUSH) 0.9 %
10 SYRINGE (ML) INJECTION EVERY 12 HOURS SCHEDULED
Status: DISCONTINUED | OUTPATIENT
Start: 2018-10-28 | End: 2018-10-28 | Stop reason: HOSPADM

## 2018-10-28 RX ORDER — FUROSEMIDE 20 MG/1
20 TABLET ORAL 2 TIMES DAILY
Status: DISCONTINUED | OUTPATIENT
Start: 2018-10-28 | End: 2018-10-28 | Stop reason: HOSPADM

## 2018-10-28 RX ORDER — ATORVASTATIN CALCIUM 80 MG/1
80 TABLET, FILM COATED ORAL NIGHTLY
Status: DISCONTINUED | OUTPATIENT
Start: 2018-10-28 | End: 2018-10-28 | Stop reason: HOSPADM

## 2018-10-28 RX ORDER — NITROGLYCERIN 0.4 MG/1
0.4 TABLET SUBLINGUAL EVERY 5 MIN PRN
Status: DISCONTINUED | OUTPATIENT
Start: 2018-10-28 | End: 2018-10-28 | Stop reason: HOSPADM

## 2018-10-28 RX ORDER — LOSARTAN POTASSIUM 25 MG/1
25 TABLET ORAL DAILY
Status: DISCONTINUED | OUTPATIENT
Start: 2018-10-28 | End: 2018-10-28 | Stop reason: HOSPADM

## 2018-10-28 RX ORDER — ACETAMINOPHEN 325 MG/1
650 TABLET ORAL EVERY 4 HOURS PRN
Status: DISCONTINUED | OUTPATIENT
Start: 2018-10-28 | End: 2018-10-28 | Stop reason: HOSPADM

## 2018-10-28 RX ORDER — NITROGLYCERIN 0.4 MG/1
0.4 TABLET SUBLINGUAL ONCE
Status: COMPLETED | OUTPATIENT
Start: 2018-10-28 | End: 2018-10-28

## 2018-10-28 RX ORDER — PANTOPRAZOLE SODIUM 40 MG/1
40 TABLET, DELAYED RELEASE ORAL
Status: DISCONTINUED | OUTPATIENT
Start: 2018-10-28 | End: 2018-10-28 | Stop reason: HOSPADM

## 2018-10-28 RX ORDER — SODIUM CHLORIDE 0.9 % (FLUSH) 0.9 %
10 SYRINGE (ML) INJECTION PRN
Status: DISCONTINUED | OUTPATIENT
Start: 2018-10-28 | End: 2018-10-28 | Stop reason: HOSPADM

## 2018-10-28 RX ORDER — AMITRIPTYLINE HYDROCHLORIDE 25 MG/1
25 TABLET, FILM COATED ORAL NIGHTLY
Status: DISCONTINUED | OUTPATIENT
Start: 2018-10-28 | End: 2018-10-28 | Stop reason: HOSPADM

## 2018-10-28 RX ORDER — OXYBUTYNIN CHLORIDE 5 MG/1
5 TABLET ORAL DAILY
Status: DISCONTINUED | OUTPATIENT
Start: 2018-10-28 | End: 2018-10-28 | Stop reason: HOSPADM

## 2018-10-28 RX ORDER — IBUPROFEN 800 MG/1
800 TABLET ORAL ONCE
Status: DISCONTINUED | OUTPATIENT
Start: 2018-10-28 | End: 2018-10-28 | Stop reason: HOSPADM

## 2018-10-28 RX ORDER — ASPIRIN 81 MG/1
81 TABLET ORAL DAILY
Status: DISCONTINUED | OUTPATIENT
Start: 2018-10-28 | End: 2018-10-28 | Stop reason: HOSPADM

## 2018-10-28 RX ADMIN — FUROSEMIDE 20 MG: 20 TABLET ORAL at 09:01

## 2018-10-28 RX ADMIN — Medication 10 ML: at 09:03

## 2018-10-28 RX ADMIN — NITROGLYCERIN 0.4 MG: 0.4 TABLET SUBLINGUAL at 00:20

## 2018-10-28 RX ADMIN — IBUPROFEN 800 MG: 800 TABLET, FILM COATED ORAL at 02:51

## 2018-10-28 RX ADMIN — PANTOPRAZOLE SODIUM 40 MG: 40 TABLET, DELAYED RELEASE ORAL at 09:02

## 2018-10-28 RX ADMIN — METOPROLOL TARTRATE 25 MG: 25 TABLET ORAL at 09:01

## 2018-10-28 RX ADMIN — ASPIRIN 81 MG: 81 TABLET ORAL at 09:01

## 2018-10-28 RX ADMIN — LOSARTAN POTASSIUM 25 MG: 25 TABLET, FILM COATED ORAL at 09:01

## 2018-10-28 RX ADMIN — METFORMIN HYDROCHLORIDE 1000 MG: 500 TABLET ORAL at 09:01

## 2018-10-28 RX ADMIN — APIXABAN 5 MG: 5 TABLET, FILM COATED ORAL at 09:01

## 2018-10-28 RX ADMIN — OXYBUTYNIN CHLORIDE 5 MG: 5 TABLET ORAL at 09:02

## 2018-10-28 ASSESSMENT — PAIN DESCRIPTION - ORIENTATION
ORIENTATION: LEFT
ORIENTATION: LEFT

## 2018-10-28 ASSESSMENT — PAIN SCALES - GENERAL
PAINLEVEL_OUTOF10: 7
PAINLEVEL_OUTOF10: 4
PAINLEVEL_OUTOF10: 5
PAINLEVEL_OUTOF10: 4

## 2018-10-28 ASSESSMENT — ENCOUNTER SYMPTOMS
WHEEZING: 0
VOMITING: 0
NAUSEA: 0
ABDOMINAL PAIN: 0
COUGH: 0
SHORTNESS OF BREATH: 1

## 2018-10-28 ASSESSMENT — PAIN DESCRIPTION - LOCATION
LOCATION: CHEST
LOCATION: CHEST

## 2018-10-28 ASSESSMENT — PAIN DESCRIPTION - PAIN TYPE
TYPE: ACUTE PAIN
TYPE: ACUTE PAIN

## 2018-10-28 ASSESSMENT — HEART SCORE: ECG: 0

## 2018-10-28 ASSESSMENT — PAIN DESCRIPTION - DESCRIPTORS: DESCRIPTORS: DULL

## 2018-10-28 NOTE — PROGRESS NOTES
CDU Daily Progress Note  Attending Physician       Pt Name: Catrer Cagle  MRN: [id number]  [place] 1950  Date of evaluation: 10/28/18    I performed a history and physical examination of the patient and discussed management with the resident. I reviewed the residents note and agree with the documented findings and plan of care. Any areas of disagreement are noted on the chart. I was personally present for the key portions of any procedures. I have documented in the chart those procedures where I was not present during the key portions. I have reviewed the emergency nurses triage note. I agree with the chief complaint, past medical history, past surgical history, allergies, medications, social and family history as documented unless otherwise noted below. Documentation of the HPI, Physical Exam and Medical Decision Making performed by medical students or scribes is based on my personal performance of the HPI, PE and MDM. For Physician Assistant/ Nurse Practitioner cases/documentation I have personally evaluated this patient and have completed at least one if not all key elements of the E/M (history, physical exam, and MDM). Additional findings are as noted. The Family History, Social History and Review of Systems are unchanged from the previous day. No significant events overnight. L sided chest pain heaviness radiation to L jaw/arm assoc w SOB while at rest, felt similar to when had stents in 2015. PMHx: CAD w stents, HTN, dyslipidemia, DM, CKDz, GERD, OA. Trops neg, CXR shows cardiomegaly with mild pulm edema, ECG first degree AVB, LVH.  Cards consulted    Matteo Valentin MD  Attending Physician  Critical Decision Unit

## 2018-10-28 NOTE — H&P
aspirin EC tablet 81 mg Daily   atorvastatin (LIPITOR) tablet 80 mg Nightly   apixaban (ELIQUIS) tablet 5 mg BID   furosemide (LASIX) tablet 20 mg BID   losartan (COZAAR) tablet 25 mg Daily   magnesium hydroxide (MILK OF MAGNESIA) 400 MG/5ML suspension 5 mL Daily PRN   metFORMIN (GLUCOPHAGE) tablet 1,000 mg BID WC   metoprolol tartrate (LOPRESSOR) tablet 25 mg BID   nitroGLYCERIN (NITROSTAT) SL tablet 0.4 mg Q5 Min PRN   oxybutynin (DITROPAN) tablet 5 mg Daily   pantoprazole (PROTONIX) tablet 40 mg QAM AC   sodium chloride flush 0.9 % injection 10 mL 2 times per day   sodium chloride flush 0.9 % injection 10 mL PRN   acetaminophen (TYLENOL) tablet 650 mg Q4H PRN   ibuprofen (ADVIL;MOTRIN) tablet 800 mg Once       All medication charted and reviewed. ALLERGIES     is allergic to lisinopril and pcn [penicillins]. FAMILY HISTORY     indicated that her mother is . She indicated that her father is . She indicated that the status of her sister is unknown. She indicated that the status of her brother is unknown.      family history includes Heart Disease in her brother, father, and mother; Other in her sister; Stroke in her mother. The patient denies any pertinent family history. I have reviewed and agree with the family history entered. I have reviewed the Family History and it is not significant to the case    SOCIAL HISTORY      reports that she has never smoked. She has never used smokeless tobacco. She reports that she does not drink alcohol or use drugs. I have reviewed and agree with all Social.  There are no concerns for substance abuse/use. PHYSICAL EXAM     INITIAL VITALS:  height is 5' 5\" (1.651 m) and weight is 290 lb (131.5 kg). Her oral temperature is 97.5 °F (36.4 °C). Her blood pressure is 147/107 (abnormal) and her pulse is 54. Her respiration is 16 and oxygen saturation is 99%.       CONSTITUTIONAL: AOx4, no apparent distress, appears stated age    HEAD: normocephalic, atraumatic   EYES: PERRLA, EOMI    ENT: moist mucous membranes, uvula midline   NECK: supple, symmetric   BACK: symmetric   LUNGS: clear to auscultation bilaterally   CARDIOVASCULAR: regular rate and rhythm, no murmurs, rubs or gallops, chest pain reproducible   ABDOMEN: soft, non-tender, non-distended with normal active bowel sounds, obese   NEUROLOGIC:  MAEx4, no focal sensory or motor deficits, CN II-XII intact   MUSCULOSKELETAL: no clubbing, cyanosis or edema   SKIN: no rash or wounds       DIFFERENTIAL DIAGNOSIS/MDM:     Chest Pain:  DDX: Emergent: ACS/NSTEMI/STEMI/angina, arrhythmia, trauma, aortic dissection,  PE, PNA, pneumothroax, esophageal rupture, tamponade, Cocaine use  Nonemergent: pneumonia, pericarditis, GERD, MSK, Endocarditis, anxiety        DIAGNOSTIC RESULTS     EKG: All EKG's are interpreted by the Observation Physician who either signs or Co-signs this chart in the absence of a cardiologist.    EKG Interpretation    Interpreted by me    Rhythm: Sinus arrhythmia   Rate: normal  Axis: Left axis  Ectopy: none  Conduction: First-degree AV block, left atrial enlargement  ST Segments: no acute change  T Waves: no acute change  Q Waves: none    Clinical Impression: When compared to previous EKG, OH interval prolonged, sinus arrhythmia now present    Nuno Gill DO      RADIOLOGY:   I directly visualized the following  images and reviewed the radiologist interpretations:    Xr Chest Portable    Result Date: 10/28/2018  EXAMINATION: SINGLE XRAY VIEW OF THE CHEST 10/28/2018 12:35 am COMPARISON: 05/28/2018 radiograph HISTORY: ORDERING SYSTEM PROVIDED HISTORY: chest pain TECHNOLOGIST PROVIDED HISTORY: chest pain FINDINGS: The heart is enlarged. Mediastinum is normal.  Mild perihilar opacities appear stable from prior imaging. No focal consolidation. No significant skeletal findings. Cardiomegaly and mild perihilar opacities. Pattern suggests CHF and mild pulmonary edema.        LABS:  I have

## 2018-10-29 ENCOUNTER — CARE COORDINATION (OUTPATIENT)
Dept: CARE COORDINATION | Age: 68
End: 2018-10-29

## 2018-10-29 LAB
EKG ATRIAL RATE: 72 BPM
EKG ATRIAL RATE: 82 BPM
EKG P AXIS: 44 DEGREES
EKG P AXIS: 54 DEGREES
EKG P-R INTERVAL: 188 MS
EKG P-R INTERVAL: 210 MS
EKG Q-T INTERVAL: 370 MS
EKG Q-T INTERVAL: 444 MS
EKG QRS DURATION: 100 MS
EKG QRS DURATION: 94 MS
EKG QTC CALCULATION (BAZETT): 432 MS
EKG QTC CALCULATION (BAZETT): 444 MS
EKG R AXIS: -14 DEGREES
EKG R AXIS: -20 DEGREES
EKG T AXIS: 48 DEGREES
EKG T AXIS: 50 DEGREES
EKG VENTRICULAR RATE: 60 BPM
EKG VENTRICULAR RATE: 82 BPM

## 2018-10-29 NOTE — DISCHARGE SUMMARY
0 0 %    Segs Absolute 5.14 1.50 - 8.10 k/uL    Absolute Lymph # 2.77 1.10 - 3.70 k/uL    Absolute Mono # 0.74 0.10 - 1.20 k/uL    Absolute Eos # 0.13 0.00 - 0.44 k/uL    Basophils # 0.03 0.00 - 0.20 k/uL    Absolute Immature Granulocyte <0.03 0.00 - 0.30 k/uL    WBC Morphology NOT REPORTED     RBC Morphology ANISOCYTOSIS PRESENT     Platelet Estimate NOT REPORTED    SPECIMEN REJECTION   Result Value Ref Range    Specimen Source BLOOD     Ordered Test BMP BNP     Reason for Rejection Unable to perform testing: Specimen hemolyzed. - NOT REPORTED    SPECIMEN REJECTION   Result Value Ref Range    Specimen Source BLOOD     Ordered Test BMP BNP     Reason for Rejection Unable to perform testing: Specimen hemolyzed. - NOT REPORTED    Basic Metabolic Panel   Result Value Ref Range    Glucose 127 (H) 70 - 99 mg/dL    BUN 16 8 - 23 mg/dL    CREATININE 0.88 0.50 - 0.90 mg/dL    Bun/Cre Ratio NOT REPORTED 9 - 20    Calcium 9.2 8.6 - 10.4 mg/dL    Sodium 142 135 - 144 mmol/L    Potassium 4.3 3.7 - 5.3 mmol/L    Chloride 103 98 - 107 mmol/L    CO2 24 20 - 31 mmol/L    Anion Gap 15 9 - 17 mmol/L    GFR Non-African American >60 >60 mL/min    GFR African American >60 >60 mL/min    GFR Comment          GFR Staging NOT REPORTED    Brain Natriuretic Peptide   Result Value Ref Range    Pro-BNP 66 <300 pg/mL    BNP Interpretation         POCT troponin   Result Value Ref Range    POC Troponin I 0.01 0.00 - 0.10 ng/mL    POC Troponin Interp       The Troponin-I (POC) results cannot be compared to the Troponin-T results. POCT troponin   Result Value Ref Range    POC Troponin I 0.01 0.00 - 0.10 ng/mL    POC Troponin Interp       The Troponin-I (POC) results cannot be compared to the Troponin-T results.    POC Glucose Fingerstick   Result Value Ref Range    POC Glucose 109 (H) 65 - 105 mg/dL   EKG 12 Lead   Result Value Ref Range    Ventricular Rate 60 BPM    Atrial Rate 72 BPM    P-R Interval 210 ms    QRS Duration 94 ms    Q-T Interval 444 ms    QTc Calculation (Bazett) 444 ms    P Axis 44 degrees    R Axis -14 degrees    T Axis 48 degrees   EKG 12 Lead   Result Value Ref Range    Ventricular Rate 82 BPM    Atrial Rate 82 BPM    P-R Interval 188 ms    QRS Duration 100 ms    Q-T Interval 370 ms    QTc Calculation (Bazett) 432 ms    P Axis 54 degrees    R Axis -20 degrees    T Axis 50 degrees     Xr Chest Portable    Result Date: 10/28/2018  EXAMINATION: SINGLE XRAY VIEW OF THE CHEST 10/28/2018 12:35 am COMPARISON: 05/28/2018 radiograph HISTORY: ORDERING SYSTEM PROVIDED HISTORY: chest pain TECHNOLOGIST PROVIDED HISTORY: chest pain FINDINGS: The heart is enlarged. Mediastinum is normal.  Mild perihilar opacities appear stable from prior imaging. No focal consolidation. No significant skeletal findings. Cardiomegaly and mild perihilar opacities. Pattern suggests CHF and mild pulmonary edema. Physical Exam:    CONSTITUTIONAL: AOx4, no apparent distress, appears stated age    HEAD: normocephalic, atraumatic   EYES: PERRLA, EOMI    ENT: moist mucous membranes, uvula midline   NECK: supple, symmetric   BACK: symmetric   LUNGS: clear to auscultation bilaterally   CARDIOVASCULAR: regular rate and rhythm, no murmurs, rubs or gallops, chest pain reproducible   ABDOMEN: soft, non-tender, non-distended with normal active bowel sounds, obese   NEUROLOGIC:  MAEx4, no focal sensory or motor deficits, CN II-XII intact   MUSCULOSKELETAL: no clubbing, cyanosis or edema   SKIN: no rash or wounds          Hospital Course:  Clinical course has improved, labs and imaging reviewed. Pako Ibrahim originally presented to the hospital on 10/27/2018 11:55 PM. with acute left sided chest pain due to unknown etiology and acute on chronic hypertension due to missed home medications. At that time it was determined that She required further observation and cardiology consult. She was admitted and labs and imaging were followed daily.   Imaging

## 2018-10-29 NOTE — CONSULTS
MD Juan   losartan (COZAAR) 25 MG tablet TAKE 1 TABLET EVERY DAY 8/30/18   Lily Nguyen MD   metoprolol tartrate (LOPRESSOR) 25 MG tablet TAKE 1 TABLET TWICE DAILY 8/30/18   Lily Nguyen MD   pantoprazole (PROTONIX) 40 MG tablet TAKE 1 TABLET BY MOUTH EVERY MORNING (BEFORE BREAKFAST) 7/20/18   Lily Nguyen MD   aspirin 81 MG tablet Take 1 tablet by mouth daily 6/21/18   Lily Nguyen MD   furosemide (LASIX) 20 MG tablet Take 1 tablet by mouth daily 6/21/18   Lily Nguyen MD   oxybutynin (DITROPAN) 5 MG tablet TAKE 1 TABLET EVERY DAY 6/21/18   Lily Nguyen MD   Handicap Placard MISC by Does not apply route Dx: 6/7/18-6/6/23  AFIB 6/7/18   Lily Nguyen MD   nitroGLYCERIN (NITROSTAT) 0.4 MG SL tablet Place 1 tablet under the tongue every 5 minutes as needed for Chest pain up to max of 3 total doses. If no relief after 1 dose, call 911. 5/30/18   Richa Decker MD   atorvastatin (LIPITOR) 80 MG tablet Take 1 tablet by mouth nightly 2/13/18   Lily Nguyen MD   glucose blood VI test strips (ONETOUCH VERIO) strip 1 each by In Vitro route daily Daily. She has a OneTouch Verio machine. Needs compatible test strips. 2/13/18   Lily Nguyen MD   acetaminophen (TYLENOL) 325 MG tablet Take 1 tablet by mouth every 6 hours as needed for Pain 2/7/18   Alexsandra Balderrama DO   Respiratory Therapy Supplies AMANDEEP Lower nasal CPAP pressure 14 cm H20 1/4/18   Arpan Ward MD   magnesium hydroxide (MILK OF MAGNESIA) 400 MG/5ML suspension Take 5 mLs by mouth daily as needed for Constipation    Historical Provider, MD           Allergies:  Lisinopril and Pcn [penicillins]    Social History:   reports that she has never smoked. She has never used smokeless tobacco. She reports that she does not drink alcohol or use drugs. Family History: family history includes Heart Disease in her brother, father, and mother; Other in her sister; Stroke in her mother. No h/o sudden cardiac death.     REVIEW OF SYSTEMS: vasodepressor response.      ECHO 5/30/18: EF 65%, grade II DD, RVSP is 40 mmHg.       CATH 3/6/17: Patent OM stent. No new significant disease.      STRESS 3/4/17: Mid to apical anterior ischemia. EF 62%      1. Syncopal episode occurring during eating, 9/3/17, with admission at [facility] at that time. 2. Hypertension with preserved ventricular function. 3. Diabetes mellitus. 4. GERD. 5. Coronary artery disease, single vessel, with cardiac catheterization from 10/12/16 showing stenosis in the mid OM1 branch, with successful PTCA and drug eluting stent. LVEF 55%. 6. Followup cardiac catheterization from 3/6/17 showing patent OM stent with no new significant disease. Luminal irregularities 10-20% were noted in other coronary segments. 7. A 2D echo from 3/3/17 showing LVEF 55% with grade I diastolic dysfunction and no significant valvular abnormalities. 8. History of migraine headache, on amitriptyline and p.r.n. Fioricet. 9. Left frontal meningioma, small, discovered incidentally during neurologic workup 9/2017. 10. Recurrent episode of near syncope while shopping May 2018 with brief period of paroxysmal atrial fibrillation afterward. Patient was hospitalized briefly at [facility] with unremarkable 2D echo and was started on Eliquis and Imdur. IMPRESSION & Recommendations:    1. Non Cardiac Chest Wall Pain. Easily reproduced on anterior chest wall. Doubt ACS. Has some risk factors. Follow troponins. EKG unremarkable. Acute MI ruled out by serial troponins. 2. Aggressive lifestyle and risk factor modifications discussed extensively with patient. 3. No further testing. Ok to follow up as outpatient. Discussed with patient, family, and Nurse. Electronically signed by Linn Nelson DO on 10/28/2018 at 10:33 PM    Linn Nelson, [id number] [facility]  [facility]  [phone]

## 2018-11-08 ENCOUNTER — CARE COORDINATION (OUTPATIENT)
Dept: CARE COORDINATION | Age: 68
End: 2018-11-08

## 2018-11-08 ENCOUNTER — HOSPITAL ENCOUNTER (OUTPATIENT)
Age: 68
Setting detail: SPECIMEN
Discharge: HOME | End: 2018-11-08
Payer: MEDICARE

## 2018-11-08 ENCOUNTER — OFFICE VISIT (OUTPATIENT)
Dept: INTERNAL MEDICINE | Age: 68
End: 2018-11-08
Payer: MEDICARE

## 2018-11-08 VITALS
HEIGHT: 65 IN | BODY MASS INDEX: 46.05 KG/M2 | DIASTOLIC BLOOD PRESSURE: 102 MMHG | WEIGHT: 276.4 LBS | SYSTOLIC BLOOD PRESSURE: 137 MMHG | HEART RATE: 73 BPM

## 2018-11-08 DIAGNOSIS — E66.9 DIABETES MELLITUS TYPE 2 IN OBESE (HCC): ICD-10-CM

## 2018-11-08 DIAGNOSIS — E66.01 MORBID OBESITY DUE TO EXCESS CALORIES (HCC): Primary | ICD-10-CM

## 2018-11-08 DIAGNOSIS — Z12.11 COLON CANCER SCREENING: ICD-10-CM

## 2018-11-08 DIAGNOSIS — G47.33 OBSTRUCTIVE SLEEP APNEA SYNDROME: ICD-10-CM

## 2018-11-08 DIAGNOSIS — E11.69 DIABETES MELLITUS TYPE 2 IN OBESE (HCC): ICD-10-CM

## 2018-11-08 DIAGNOSIS — D32.9 MENINGIOMA (HCC): ICD-10-CM

## 2018-11-08 DIAGNOSIS — I10 ESSENTIAL HYPERTENSION: ICD-10-CM

## 2018-11-08 LAB — HEPATITIS C ANTIBODY: NONREACTIVE

## 2018-11-08 PROCEDURE — G8484 FLU IMMUNIZE NO ADMIN: HCPCS | Performed by: INTERNAL MEDICINE

## 2018-11-08 PROCEDURE — 86803 HEPATITIS C AB TEST: CPT

## 2018-11-08 PROCEDURE — 4040F PNEUMOC VAC/ADMIN/RCVD: CPT | Performed by: INTERNAL MEDICINE

## 2018-11-08 PROCEDURE — G8427 DOCREV CUR MEDS BY ELIG CLIN: HCPCS | Performed by: INTERNAL MEDICINE

## 2018-11-08 PROCEDURE — 1090F PRES/ABSN URINE INCON ASSESS: CPT | Performed by: INTERNAL MEDICINE

## 2018-11-08 PROCEDURE — G8399 PT W/DXA RESULTS DOCUMENT: HCPCS | Performed by: INTERNAL MEDICINE

## 2018-11-08 PROCEDURE — G8598 ASA/ANTIPLAT THER USED: HCPCS | Performed by: INTERNAL MEDICINE

## 2018-11-08 PROCEDURE — 1123F ACP DISCUSS/DSCN MKR DOCD: CPT | Performed by: INTERNAL MEDICINE

## 2018-11-08 PROCEDURE — 3017F COLORECTAL CA SCREEN DOC REV: CPT | Performed by: INTERNAL MEDICINE

## 2018-11-08 PROCEDURE — 3044F HG A1C LEVEL LT 7.0%: CPT | Performed by: INTERNAL MEDICINE

## 2018-11-08 PROCEDURE — 99214 OFFICE O/P EST MOD 30 MIN: CPT | Performed by: INTERNAL MEDICINE

## 2018-11-08 PROCEDURE — 99211 OFF/OP EST MAY X REQ PHY/QHP: CPT | Performed by: INTERNAL MEDICINE

## 2018-11-08 PROCEDURE — G8417 CALC BMI ABV UP PARAM F/U: HCPCS | Performed by: INTERNAL MEDICINE

## 2018-11-08 PROCEDURE — 36415 COLL VENOUS BLD VENIPUNCTURE: CPT

## 2018-11-08 PROCEDURE — 1036F TOBACCO NON-USER: CPT | Performed by: INTERNAL MEDICINE

## 2018-11-08 PROCEDURE — 82274 ASSAY TEST FOR BLOOD FECAL: CPT | Performed by: INTERNAL MEDICINE

## 2018-11-08 PROCEDURE — 1101F PT FALLS ASSESS-DOCD LE1/YR: CPT | Performed by: INTERNAL MEDICINE

## 2018-11-08 PROCEDURE — 2022F DILAT RTA XM EVC RTNOPTHY: CPT | Performed by: INTERNAL MEDICINE

## 2018-11-08 RX ORDER — NEBULIZER ACCESSORIES
1 KIT MISCELLANEOUS DAILY PRN
Qty: 1 KIT | Refills: 0 | Status: SHIPPED | OUTPATIENT
Start: 2018-11-08 | End: 2019-03-11

## 2018-11-08 RX ORDER — LANCETS 30 GAUGE
1 EACH MISCELLANEOUS DAILY
Qty: 100 EACH | Refills: 11 | Status: SHIPPED | OUTPATIENT
Start: 2018-11-08 | End: 2019-05-30 | Stop reason: SDUPTHER

## 2018-11-08 ASSESSMENT — ENCOUNTER SYMPTOMS
GASTROINTESTINAL NEGATIVE: 1
EYES NEGATIVE: 1
SHORTNESS OF BREATH: 1
ALLERGIC/IMMUNOLOGIC NEGATIVE: 1

## 2018-11-08 NOTE — PROGRESS NOTES
Family History   Problem Relation Age of Onset    Stroke Mother     Heart Disease Mother     Heart Disease Father     Other Sister         migraines    Heart Disease Brother        REVIEW OF SYSTEMS:    Review of Systems   Constitutional: Negative. HENT: Negative. Eyes: Negative. Respiratory: Positive for shortness of breath. Cardiovascular: Positive for chest pain. Gastrointestinal: Negative. Endocrine: Negative. Genitourinary: Negative. Musculoskeletal: Negative. Skin: Negative. Allergic/Immunologic: Negative. Neurological: Positive for dizziness and headaches. Hematological: Negative. Psychiatric/Behavioral: Negative.         PHYSICAL EXAM:      Vitals:    11/08/18 1343   BP: (!) 137/102   Pulse: 73     BP Readings from Last 3 Encounters:   11/08/18 (!) 137/102   10/28/18 (!) 147/116   07/26/18 135/82       Physical Examination: General appearance - alert, well appearing, and in no distress  Mental status - alert, oriented to person, place, and time  Eyes - pupils equal and reactive, extraocular eye movements intact  Chest - clear to auscultation, no wheezes, rales or rhonchi, symmetric air entry  Heart - normal rate and regular rhythm  Abdomen - bowel sounds normal  Back exam - full range of motion, no tenderness, palpable spasm or pain on motion  Neurological - alert, oriented, normal speech, no focal findings or movement disorder noted  Musculoskeletal - no joint tenderness, deformity or swelling    LABORATORY FINDINGS:    CBC:   Lab Results   Component Value Date    WBC 8.8 10/28/2018    HGB 13.1 10/28/2018     10/28/2018     BMP:    Lab Results   Component Value Date     10/28/2018    K 4.3 10/28/2018     10/28/2018    CO2 24 10/28/2018    BUN 16 10/28/2018    CREATININE 0.88 10/28/2018    GLUCOSE 127 10/28/2018     Hemoglobin A1C:   Lab Results   Component Value Date    LABA1C 6.8 06/07/2018     Microalbumin Urine:   Lab Results   Component Value Date    MICROALBUR 16 2017     Lipid profile:   Lab Results   Component Value Date    CHOL 122 2017    TRIG 116 2017    HDL 44 2017     Thyroid functions:   Lab Results   Component Value Date    TSH 0.94 2018      Hepatic functions:   Lab Results   Component Value Date    ALT 15 2016    AST 17 2015    PROT 8.1 2015    BILITOT 0.31 2015    BILIDIR 0.10 09/10/2012    LABALBU 4.6 2015     Urine Analysis: No results found for: [id number] Arthur Mclain:      Health Maintenance Due   Topic Date Due    Hepatitis C screen  1950    Shingles Vaccine (1 of 2 - 2 Dose Series) 1999    Lipid screen  2018    Diabetic microalbuminuria test  2018    Colon Cancer Screen FIT/FOBT  2018    Flu vaccine (1) 2018       ASSESSMENT AND PLAN:       Diagnosis Orders   1. Morbid obesity due to excess calories (HCC)  Respiratory Therapy Supplies (NEBULIZER/TUBING/MOUTHPIECE) KIT   2. Meningioma ([address]     3. Diabetes mellitus type 2 in obese (HCC)  Microalbumin, Ur    blood glucose test strips (ONETOUCH VERIO) strip    Lancets MISC   4. Essential hypertension  controlled   5. Obstructive sleep apnea syndrome  On CPAP   6. Colon cancer screening  Hepatitis C Antibody    POCT Fecal Immunochemical Test (FIT)         FOLLOW UP:   1. Adiel Munoz received counseling on the following healthy behaviors: nutrition and exercise    2. Reviewed prior labs and health maintenance. 3.  Discussed use, benefit, and side effects of prescribed medications. Barriers to medication compliance addressed. All patient questions answered. Pt voiced understanding. 4.  Continue current medications, diet and exercise. Orders Placed This Encounter   Medications    blood glucose test strips (ONETOUCH VERIO) strip     Si each by In Vitro route daily Daily. She has a OneTouch Verio machine. Needs compatible test strips.      Dispense:  50

## 2018-11-08 NOTE — CARE COORDINATION
Ambulatory Care Coordination Note  11/8/2018  CM Risk Score: 6  Bhavna Mortality Risk Score: 6    ACC: David Muñoz RN    Summary Note: Met with Anthony Salinas during her office visit. She states that she is doing well. She hasn't checked her blood sugar in two weeks because she is out of strips. She also needs refill on her lancets. She stated that she needs a prescription for a new mask and tubing for her CPAP. She has a follow up with cardiology after her tilt table test. No further episodes of dizziness. She is in the process of moving from a 3 bedroom house to a 1 bedroom apartment. She said she is doing most of it herself. She denies any other need today. Will follow up in a month. If stable, will recommend for graduation. Care Coordination Interventions    Program Enrollment:  Complex Care  Referral from Primary Care Provider:  No  Suggested Interventions and Community Resources  Diabetes Education:  Completed  Transportation Support:  Completed  Zone Management Tools:  Completed         Goals Addressed     None          Prior to Admission medications    Medication Sig Start Date End Date Taking?  Authorizing Provider   amitriptyline (ELAVIL) 50 MG tablet TAKE 1 TABLET EVERY NIGHT 8/30/18   Karlene Vila MD   metFORMIN (GLUCOPHAGE) 1000 MG tablet TAKE 1 TABLET TWICE DAILY WITH MEALS 8/30/18   Karlene Vila MD   ELIQUIS 5 MG TABS tablet TAKE 1 TABLET TWICE DAILY 8/30/18   Karlene Vila MD   losartan (COZAAR) 25 MG tablet TAKE 1 TABLET EVERY DAY 8/30/18   Karlene Vila MD   metoprolol tartrate (LOPRESSOR) 25 MG tablet TAKE 1 TABLET TWICE DAILY 8/30/18   Karlene Vila MD   pantoprazole (PROTONIX) 40 MG tablet TAKE 1 TABLET BY MOUTH EVERY MORNING (BEFORE BREAKFAST) 7/20/18   Karlene Vila MD   aspirin 81 MG tablet Take 1 tablet by mouth daily 6/21/18   Karlene Vila MD   furosemide (LASIX) 20 MG tablet Take 1 tablet by mouth daily 6/21/18   Karlene Vila MD   oxybutynin (DITROPAN) 5 MG tablet TAKE 1 TABLET

## 2018-11-28 ENCOUNTER — TELEPHONE (OUTPATIENT)
Dept: INTERNAL MEDICINE | Age: 68
End: 2018-11-28

## 2018-11-28 NOTE — LETTER
[address]  [address] [facility] [address] [facility]  Phone: [phone]  Fax: [phone]    Jessica Estevez MD        December 19, 2018    [address]      Dear Nick Cornejo:    During your last appointment with us on 11/8/18 with Dr. Shavonne Bentley, you were given the OC-Light hemoccult test kit to provide a sample of your stool in lieu of scheduling a colonoscopy. The kit has not yet been returned to us. Please follow the instructions on the envelope provided to you with the kit and return it to the clinic either by mail or in person at your earliest convenience. We are on the 2nd floor. Keep in mind that once a sample has been collected, it is good for up to 15 days in room temperature, or 30 days refrigerated. If you have any questions or concerns, please don't hesitate to call.     Sincerely,        Jessica Estevez MD

## 2019-01-02 ENCOUNTER — TELEPHONE (OUTPATIENT)
Dept: GASTROENTEROLOGY | Age: 69
End: 2019-01-02

## 2019-01-02 ENCOUNTER — CARE COORDINATION (OUTPATIENT)
Dept: CARE COORDINATION | Age: 69
End: 2019-01-02

## 2019-01-18 ENCOUNTER — HOSPITAL ENCOUNTER (EMERGENCY)
Age: 69
Discharge: HOME | End: 2019-01-18
Attending: EMERGENCY MEDICINE
Payer: MEDICARE

## 2019-01-18 ENCOUNTER — TELEPHONE (OUTPATIENT)
Dept: INTERNAL MEDICINE CLINIC | Age: 69
End: 2019-01-18

## 2019-01-18 VITALS
TEMPERATURE: 98.2 F | SYSTOLIC BLOOD PRESSURE: 144 MMHG | OXYGEN SATURATION: 99 % | HEART RATE: 89 BPM | DIASTOLIC BLOOD PRESSURE: 83 MMHG | RESPIRATION RATE: 16 BRPM

## 2019-01-18 DIAGNOSIS — R04.0 EPISTAXIS: Primary | ICD-10-CM

## 2019-01-18 DIAGNOSIS — M25.561 RIGHT KNEE PAIN, UNSPECIFIED CHRONICITY: Primary | ICD-10-CM

## 2019-01-18 LAB
ABSOLUTE EOS #: 0.13 K/UL (ref 0–0.44)
ABSOLUTE IMMATURE GRANULOCYTE: <0.03 K/UL (ref 0–0.3)
ABSOLUTE LYMPH #: 3.07 K/UL (ref 1.1–3.7)
ABSOLUTE MONO #: 0.66 K/UL (ref 0.1–1.2)
BASOPHILS # BLD: 0 % (ref 0–2)
BASOPHILS ABSOLUTE: 0.03 K/UL (ref 0–0.2)
DIFFERENTIAL TYPE: NORMAL
EOSINOPHILS RELATIVE PERCENT: 2 % (ref 1–4)
HCT VFR BLD CALC: 39 % (ref 36.3–47.1)
HEMOGLOBIN: 12.1 G/DL (ref 11.9–15.1)
IMMATURE GRANULOCYTES: 0 %
INR BLD: 1
LYMPHOCYTES # BLD: 42 % (ref 24–43)
MCH RBC QN AUTO: 29.6 PG (ref 25.2–33.5)
MCHC RBC AUTO-ENTMCNC: 31 G/DL (ref 28.4–34.8)
MCV RBC AUTO: 95.4 FL (ref 82.6–102.9)
MONOCYTES # BLD: 9 % (ref 3–12)
NRBC AUTOMATED: 0 PER 100 WBC
PARTIAL THROMBOPLASTIN TIME: 27.1 SEC (ref 20.5–30.5)
PDW BLD-RTO: 14.1 % (ref 11.8–14.4)
PLATELET # BLD: 180 K/UL (ref 138–453)
PLATELET ESTIMATE: NORMAL
PMV BLD AUTO: 10.4 FL (ref 8.1–13.5)
PROTHROMBIN TIME: 10.9 SEC (ref 9–12)
RBC # BLD: 4.09 M/UL (ref 3.95–5.11)
RBC # BLD: NORMAL 10*6/UL
SEG NEUTROPHILS: 47 % (ref 36–65)
SEGMENTED NEUTROPHILS ABSOLUTE COUNT: 3.49 K/UL (ref 1.5–8.1)
WBC # BLD: 7.4 K/UL (ref 3.5–11.3)
WBC # BLD: NORMAL 10*3/UL

## 2019-01-18 PROCEDURE — 99283 EMERGENCY DEPT VISIT LOW MDM: CPT

## 2019-01-18 PROCEDURE — 85025 COMPLETE CBC W/AUTO DIFF WBC: CPT

## 2019-01-18 PROCEDURE — 30901 CONTROL OF NOSEBLEED: CPT

## 2019-01-18 PROCEDURE — 85610 PROTHROMBIN TIME: CPT

## 2019-01-18 PROCEDURE — 6370000000 HC RX 637 (ALT 250 FOR IP): Performed by: STUDENT IN AN ORGANIZED HEALTH CARE EDUCATION/TRAINING PROGRAM

## 2019-01-18 PROCEDURE — 85730 THROMBOPLASTIN TIME PARTIAL: CPT

## 2019-01-18 RX ORDER — ACETAMINOPHEN 325 MG/1
650 TABLET ORAL ONCE
Status: COMPLETED | OUTPATIENT
Start: 2019-01-18 | End: 2019-01-18

## 2019-01-18 RX ADMIN — ACETAMINOPHEN 650 MG: 325 TABLET ORAL at 02:21

## 2019-01-18 ASSESSMENT — ENCOUNTER SYMPTOMS
SORE THROAT: 0
RHINORRHEA: 0
WHEEZING: 0
NAUSEA: 0
CHEST TIGHTNESS: 0
SHORTNESS OF BREATH: 0
ABDOMINAL DISTENTION: 0
EYE DISCHARGE: 0
EYE PAIN: 0
BACK PAIN: 0
COUGH: 0
VOMITING: 0
PHOTOPHOBIA: 0

## 2019-01-18 ASSESSMENT — PAIN DESCRIPTION - ORIENTATION: ORIENTATION: LEFT

## 2019-01-18 ASSESSMENT — PAIN DESCRIPTION - LOCATION
LOCATION: HEAD
LOCATION: HEAD

## 2019-01-18 ASSESSMENT — PAIN SCALES - GENERAL
PAINLEVEL_OUTOF10: 7
PAINLEVEL_OUTOF10: 7
PAINLEVEL_OUTOF10: 5

## 2019-01-18 ASSESSMENT — PAIN DESCRIPTION - PAIN TYPE: TYPE: ACUTE PAIN

## 2019-01-18 ASSESSMENT — PAIN DESCRIPTION - FREQUENCY: FREQUENCY: CONTINUOUS

## 2019-01-21 ENCOUNTER — CARE COORDINATION (OUTPATIENT)
Dept: CARE COORDINATION | Age: 69
End: 2019-01-21

## 2019-01-21 ENCOUNTER — OFFICE VISIT (OUTPATIENT)
Dept: ORTHOPEDIC SURGERY | Age: 69
End: 2019-01-21
Payer: MEDICARE

## 2019-01-21 ENCOUNTER — HOSPITAL ENCOUNTER (OUTPATIENT)
Dept: GENERAL RADIOLOGY | Facility: CLINIC | Age: 69
Discharge: HOME | End: 2019-01-23
Payer: MEDICARE

## 2019-01-21 VITALS — WEIGHT: 276.46 LBS | BODY MASS INDEX: 46.06 KG/M2 | HEIGHT: 65 IN

## 2019-01-21 DIAGNOSIS — M25.561 RIGHT KNEE PAIN, UNSPECIFIED CHRONICITY: ICD-10-CM

## 2019-01-21 DIAGNOSIS — M70.41 PREPATELLAR BURSITIS OF RIGHT KNEE: Primary | ICD-10-CM

## 2019-01-21 PROCEDURE — G8598 ASA/ANTIPLAT THER USED: HCPCS | Performed by: ORTHOPAEDIC SURGERY

## 2019-01-21 PROCEDURE — 3017F COLORECTAL CA SCREEN DOC REV: CPT | Performed by: ORTHOPAEDIC SURGERY

## 2019-01-21 PROCEDURE — 1101F PT FALLS ASSESS-DOCD LE1/YR: CPT | Performed by: ORTHOPAEDIC SURGERY

## 2019-01-21 PROCEDURE — 1090F PRES/ABSN URINE INCON ASSESS: CPT | Performed by: ORTHOPAEDIC SURGERY

## 2019-01-21 PROCEDURE — G8427 DOCREV CUR MEDS BY ELIG CLIN: HCPCS | Performed by: ORTHOPAEDIC SURGERY

## 2019-01-21 PROCEDURE — 1036F TOBACCO NON-USER: CPT | Performed by: ORTHOPAEDIC SURGERY

## 2019-01-21 PROCEDURE — 1123F ACP DISCUSS/DSCN MKR DOCD: CPT | Performed by: ORTHOPAEDIC SURGERY

## 2019-01-21 PROCEDURE — 20610 DRAIN/INJ JOINT/BURSA W/O US: CPT | Performed by: ORTHOPAEDIC SURGERY

## 2019-01-21 PROCEDURE — 4040F PNEUMOC VAC/ADMIN/RCVD: CPT | Performed by: ORTHOPAEDIC SURGERY

## 2019-01-21 PROCEDURE — 99213 OFFICE O/P EST LOW 20 MIN: CPT | Performed by: ORTHOPAEDIC SURGERY

## 2019-01-21 PROCEDURE — 73564 X-RAY EXAM KNEE 4 OR MORE: CPT

## 2019-01-21 PROCEDURE — G8417 CALC BMI ABV UP PARAM F/U: HCPCS | Performed by: ORTHOPAEDIC SURGERY

## 2019-01-21 PROCEDURE — G8484 FLU IMMUNIZE NO ADMIN: HCPCS | Performed by: ORTHOPAEDIC SURGERY

## 2019-01-21 PROCEDURE — G8399 PT W/DXA RESULTS DOCUMENT: HCPCS | Performed by: ORTHOPAEDIC SURGERY

## 2019-01-21 RX ORDER — BUPIVACAINE HYDROCHLORIDE 5 MG/ML
1 INJECTION, SOLUTION PERINEURAL ONCE
Status: COMPLETED | OUTPATIENT
Start: 2019-01-21 | End: 2019-01-21

## 2019-01-21 RX ORDER — BETAMETHASONE SODIUM PHOSPHATE AND BETAMETHASONE ACETATE 3; 3 MG/ML; MG/ML
6 INJECTION, SUSPENSION INTRA-ARTICULAR; INTRALESIONAL; INTRAMUSCULAR; SOFT TISSUE ONCE
Status: COMPLETED | OUTPATIENT
Start: 2019-01-21 | End: 2019-01-21

## 2019-01-21 RX ADMIN — BUPIVACAINE HYDROCHLORIDE 5 MG: 5 INJECTION, SOLUTION PERINEURAL at 16:26

## 2019-01-21 RX ADMIN — BETAMETHASONE SODIUM PHOSPHATE AND BETAMETHASONE ACETATE 6 MG: 3; 3 INJECTION, SUSPENSION INTRA-ARTICULAR; INTRALESIONAL; INTRAMUSCULAR; SOFT TISSUE at 16:27

## 2019-01-26 RX ORDER — PANTOPRAZOLE SODIUM 40 MG/1
TABLET, DELAYED RELEASE ORAL
Qty: 90 TABLET | Refills: 3 | Status: SHIPPED | OUTPATIENT
Start: 2019-01-26 | End: 2019-05-30 | Stop reason: SDUPTHER

## 2019-02-01 ENCOUNTER — HOSPITAL ENCOUNTER (OUTPATIENT)
Age: 69
Discharge: HOME | End: 2019-02-01
Payer: MEDICARE

## 2019-02-01 ENCOUNTER — OFFICE VISIT (OUTPATIENT)
Dept: PRIMARY CARE CLINIC | Age: 69
End: 2019-02-01
Payer: MEDICARE

## 2019-02-01 VITALS
HEART RATE: 84 BPM | BODY MASS INDEX: 45.43 KG/M2 | WEIGHT: 273 LBS | SYSTOLIC BLOOD PRESSURE: 140 MMHG | DIASTOLIC BLOOD PRESSURE: 90 MMHG

## 2019-02-01 DIAGNOSIS — R04.0 EPISTAXIS: Primary | ICD-10-CM

## 2019-02-01 LAB
ABSOLUTE EOS #: 0.22 K/UL (ref 0–0.44)
ABSOLUTE IMMATURE GRANULOCYTE: <0.03 K/UL (ref 0–0.3)
ABSOLUTE LYMPH #: 2.73 K/UL (ref 1.1–3.7)
ABSOLUTE MONO #: 0.7 K/UL (ref 0.1–1.2)
BASOPHILS # BLD: 0 % (ref 0–2)
BASOPHILS ABSOLUTE: 0.04 K/UL (ref 0–0.2)
DIFFERENTIAL TYPE: NORMAL
EOSINOPHILS RELATIVE PERCENT: 2 % (ref 1–4)
HCT VFR BLD CALC: 43.3 % (ref 36.3–47.1)
HEMOGLOBIN: 13.7 G/DL (ref 11.9–15.1)
IMMATURE GRANULOCYTES: 0 %
INR BLD: 1
LYMPHOCYTES # BLD: 30 % (ref 24–43)
MCH RBC QN AUTO: 29.9 PG (ref 25.2–33.5)
MCHC RBC AUTO-ENTMCNC: 31.6 G/DL (ref 28.4–34.8)
MCV RBC AUTO: 94.5 FL (ref 82.6–102.9)
MONOCYTES # BLD: 8 % (ref 3–12)
NRBC AUTOMATED: 0 PER 100 WBC
PARTIAL THROMBOPLASTIN TIME: 30.3 SEC (ref 20.5–30.5)
PDW BLD-RTO: 14 % (ref 11.8–14.4)
PLATELET # BLD: 219 K/UL (ref 138–453)
PLATELET ESTIMATE: NORMAL
PMV BLD AUTO: 10 FL (ref 8.1–13.5)
PROTHROMBIN TIME: 10.5 SEC (ref 9–12)
RBC # BLD: 4.58 M/UL (ref 3.95–5.11)
RBC # BLD: NORMAL 10*6/UL
SEG NEUTROPHILS: 60 % (ref 36–65)
SEGMENTED NEUTROPHILS ABSOLUTE COUNT: 5.39 K/UL (ref 1.5–8.1)
WBC # BLD: 9.1 K/UL (ref 3.5–11.3)
WBC # BLD: NORMAL 10*3/UL

## 2019-02-01 PROCEDURE — 85610 PROTHROMBIN TIME: CPT

## 2019-02-01 PROCEDURE — G8399 PT W/DXA RESULTS DOCUMENT: HCPCS | Performed by: NURSE PRACTITIONER

## 2019-02-01 PROCEDURE — G8598 ASA/ANTIPLAT THER USED: HCPCS | Performed by: NURSE PRACTITIONER

## 2019-02-01 PROCEDURE — 99202 OFFICE O/P NEW SF 15 MIN: CPT | Performed by: NURSE PRACTITIONER

## 2019-02-01 PROCEDURE — 1036F TOBACCO NON-USER: CPT | Performed by: NURSE PRACTITIONER

## 2019-02-01 PROCEDURE — 36415 COLL VENOUS BLD VENIPUNCTURE: CPT

## 2019-02-01 PROCEDURE — G8427 DOCREV CUR MEDS BY ELIG CLIN: HCPCS | Performed by: NURSE PRACTITIONER

## 2019-02-01 PROCEDURE — 85025 COMPLETE CBC W/AUTO DIFF WBC: CPT

## 2019-02-01 PROCEDURE — G8417 CALC BMI ABV UP PARAM F/U: HCPCS | Performed by: NURSE PRACTITIONER

## 2019-02-01 PROCEDURE — 1101F PT FALLS ASSESS-DOCD LE1/YR: CPT | Performed by: NURSE PRACTITIONER

## 2019-02-01 PROCEDURE — 4040F PNEUMOC VAC/ADMIN/RCVD: CPT | Performed by: NURSE PRACTITIONER

## 2019-02-01 PROCEDURE — 1123F ACP DISCUSS/DSCN MKR DOCD: CPT | Performed by: NURSE PRACTITIONER

## 2019-02-01 PROCEDURE — G8484 FLU IMMUNIZE NO ADMIN: HCPCS | Performed by: NURSE PRACTITIONER

## 2019-02-01 PROCEDURE — 85730 THROMBOPLASTIN TIME PARTIAL: CPT

## 2019-02-01 PROCEDURE — 1090F PRES/ABSN URINE INCON ASSESS: CPT | Performed by: NURSE PRACTITIONER

## 2019-02-01 PROCEDURE — 3017F COLORECTAL CA SCREEN DOC REV: CPT | Performed by: NURSE PRACTITIONER

## 2019-02-01 RX ORDER — MUPIROCIN CALCIUM 20 MG/G
CREAM TOPICAL
Qty: 15 G | Refills: 0 | Status: ON HOLD | OUTPATIENT
Start: 2019-02-01 | End: 2019-02-11 | Stop reason: HOSPADM

## 2019-02-01 ASSESSMENT — PATIENT HEALTH QUESTIONNAIRE - PHQ9
2. FEELING DOWN, DEPRESSED OR HOPELESS: 0
SUM OF ALL RESPONSES TO PHQ QUESTIONS 1-9: 0
1. LITTLE INTEREST OR PLEASURE IN DOING THINGS: 0
SUM OF ALL RESPONSES TO PHQ QUESTIONS 1-9: 0
SUM OF ALL RESPONSES TO PHQ9 QUESTIONS 1 & 2: 0

## 2019-02-01 ASSESSMENT — ENCOUNTER SYMPTOMS
VOMITING: 0
ABDOMINAL PAIN: 0
SHORTNESS OF BREATH: 0
NAUSEA: 0
DIARRHEA: 0
SINUS PAIN: 0
SORE THROAT: 0
COUGH: 0

## 2019-02-08 ENCOUNTER — HOSPITAL ENCOUNTER (INPATIENT)
Age: 69
LOS: 3 days | Discharge: HOME | DRG: 303 | End: 2019-02-11
Attending: INTERNAL MEDICINE | Admitting: INTERNAL MEDICINE
Payer: MEDICARE

## 2019-02-08 DIAGNOSIS — R04.0 EPISTAXIS, RECURRENT: Primary | ICD-10-CM

## 2019-02-08 PROBLEM — I20.0 UNSTABLE ANGINA (HCC): Status: ACTIVE | Noted: 2019-02-08

## 2019-02-08 PROBLEM — I48.91 ATRIAL FIBRILLATION WITH RVR (HCC): Status: RESOLVED | Noted: 2018-05-28 | Resolved: 2019-02-08

## 2019-02-08 PROBLEM — I48.20 CHRONIC ATRIAL FIBRILLATION (HCC): Status: ACTIVE | Noted: 2019-02-08

## 2019-02-08 LAB
TROPONIN INTERP: NORMAL
TROPONIN INTERP: NORMAL
TROPONIN T: NORMAL NG/ML
TROPONIN T: NORMAL NG/ML
TROPONIN, HIGH SENSITIVITY: 10 NG/L (ref 0–14)
TROPONIN, HIGH SENSITIVITY: 9 NG/L (ref 0–14)

## 2019-02-08 PROCEDURE — 94762 N-INVAS EAR/PLS OXIMTRY CONT: CPT

## 2019-02-08 PROCEDURE — 2060000000 HC ICU INTERMEDIATE R&B

## 2019-02-08 PROCEDURE — 36415 COLL VENOUS BLD VENIPUNCTURE: CPT

## 2019-02-08 PROCEDURE — 6370000000 HC RX 637 (ALT 250 FOR IP): Performed by: INTERNAL MEDICINE

## 2019-02-08 PROCEDURE — 99222 1ST HOSP IP/OBS MODERATE 55: CPT | Performed by: INTERNAL MEDICINE

## 2019-02-08 PROCEDURE — 84484 ASSAY OF TROPONIN QUANT: CPT

## 2019-02-08 PROCEDURE — 2580000003 HC RX 258: Performed by: INTERNAL MEDICINE

## 2019-02-08 RX ORDER — SODIUM CHLORIDE 0.9 % (FLUSH) 0.9 %
10 SYRINGE (ML) INJECTION PRN
Status: DISCONTINUED | OUTPATIENT
Start: 2019-02-08 | End: 2019-02-11 | Stop reason: HOSPADM

## 2019-02-08 RX ORDER — ASPIRIN 81 MG/1
81 TABLET ORAL DAILY
Status: DISCONTINUED | OUTPATIENT
Start: 2019-02-08 | End: 2019-02-11 | Stop reason: HOSPADM

## 2019-02-08 RX ORDER — NITROGLYCERIN 20 MG/100ML
5 INJECTION INTRAVENOUS CONTINUOUS
Status: DISCONTINUED | OUTPATIENT
Start: 2019-02-08 | End: 2019-02-09

## 2019-02-08 RX ORDER — MAGNESIUM SULFATE 1 G/100ML
1 INJECTION INTRAVENOUS PRN
Status: DISCONTINUED | OUTPATIENT
Start: 2019-02-08 | End: 2019-02-11 | Stop reason: HOSPADM

## 2019-02-08 RX ORDER — POTASSIUM CHLORIDE 20MEQ/15ML
40 LIQUID (ML) ORAL PRN
Status: DISCONTINUED | OUTPATIENT
Start: 2019-02-08 | End: 2019-02-11 | Stop reason: HOSPADM

## 2019-02-08 RX ORDER — SODIUM CHLORIDE 0.9 % (FLUSH) 0.9 %
10 SYRINGE (ML) INJECTION EVERY 12 HOURS SCHEDULED
Status: DISCONTINUED | OUTPATIENT
Start: 2019-02-08 | End: 2019-02-11 | Stop reason: HOSPADM

## 2019-02-08 RX ORDER — LOSARTAN POTASSIUM 25 MG/1
25 TABLET ORAL DAILY
Status: DISCONTINUED | OUTPATIENT
Start: 2019-02-08 | End: 2019-02-09

## 2019-02-08 RX ORDER — ONDANSETRON 2 MG/ML
4 INJECTION INTRAMUSCULAR; INTRAVENOUS EVERY 6 HOURS PRN
Status: DISCONTINUED | OUTPATIENT
Start: 2019-02-08 | End: 2019-02-11 | Stop reason: HOSPADM

## 2019-02-08 RX ORDER — FAMOTIDINE 20 MG/1
20 TABLET, FILM COATED ORAL 2 TIMES DAILY
Status: DISCONTINUED | OUTPATIENT
Start: 2019-02-08 | End: 2019-02-11 | Stop reason: HOSPADM

## 2019-02-08 RX ORDER — DEXTROSE, SODIUM CHLORIDE, AND POTASSIUM CHLORIDE 5; .45; .15 G/100ML; G/100ML; G/100ML
INJECTION INTRAVENOUS CONTINUOUS
Status: DISCONTINUED | OUTPATIENT
Start: 2019-02-08 | End: 2019-02-09

## 2019-02-08 RX ORDER — POTASSIUM CHLORIDE 20 MEQ/1
40 TABLET, EXTENDED RELEASE ORAL PRN
Status: DISCONTINUED | OUTPATIENT
Start: 2019-02-08 | End: 2019-02-11 | Stop reason: HOSPADM

## 2019-02-08 RX ORDER — FUROSEMIDE 20 MG/1
20 TABLET ORAL DAILY
Status: DISCONTINUED | OUTPATIENT
Start: 2019-02-08 | End: 2019-02-11 | Stop reason: HOSPADM

## 2019-02-08 RX ORDER — ATORVASTATIN CALCIUM 80 MG/1
80 TABLET, FILM COATED ORAL NIGHTLY
Status: DISCONTINUED | OUTPATIENT
Start: 2019-02-08 | End: 2019-02-11 | Stop reason: HOSPADM

## 2019-02-08 RX ORDER — POTASSIUM CHLORIDE 7.45 MG/ML
10 INJECTION INTRAVENOUS PRN
Status: DISCONTINUED | OUTPATIENT
Start: 2019-02-08 | End: 2019-02-11 | Stop reason: HOSPADM

## 2019-02-08 RX ADMIN — METOPROLOL TARTRATE 25 MG: 25 TABLET ORAL at 21:32

## 2019-02-08 RX ADMIN — METOPROLOL TARTRATE 25 MG: 25 TABLET ORAL at 20:51

## 2019-02-08 RX ADMIN — ATORVASTATIN CALCIUM 80 MG: 80 TABLET, FILM COATED ORAL at 21:30

## 2019-02-08 RX ADMIN — Medication 10 ML: at 21:34

## 2019-02-08 RX ADMIN — FAMOTIDINE 20 MG: 20 TABLET, FILM COATED ORAL at 21:32

## 2019-02-08 RX ADMIN — LOSARTAN POTASSIUM 25 MG: 25 TABLET, FILM COATED ORAL at 21:32

## 2019-02-08 RX ADMIN — ASPIRIN 81 MG: 81 TABLET ORAL at 21:32

## 2019-02-08 ASSESSMENT — PAIN SCALES - GENERAL: PAINLEVEL_OUTOF10: 0

## 2019-02-09 LAB
ALBUMIN SERPL-MCNC: 3.9 G/DL (ref 3.5–5.2)
ALBUMIN/GLOBULIN RATIO: 1.1 (ref 1–2.5)
ALP BLD-CCNC: 97 U/L (ref 35–104)
ALT SERPL-CCNC: 14 U/L (ref 5–33)
ANION GAP SERPL CALCULATED.3IONS-SCNC: 14 MMOL/L (ref 9–17)
AST SERPL-CCNC: 13 U/L
BILIRUB SERPL-MCNC: 0.62 MG/DL (ref 0.3–1.2)
BNP INTERPRETATION: ABNORMAL
BUN BLDV-MCNC: 13 MG/DL (ref 8–23)
BUN/CREAT BLD: ABNORMAL (ref 9–20)
CALCIUM SERPL-MCNC: 9.5 MG/DL (ref 8.6–10.4)
CHLORIDE BLD-SCNC: 103 MMOL/L (ref 98–107)
CHOLESTEROL/HDL RATIO: 2
CHOLESTEROL: 129 MG/DL
CO2: 21 MMOL/L (ref 20–31)
CREAT SERPL-MCNC: 0.76 MG/DL (ref 0.5–0.9)
EKG ATRIAL RATE: 76 BPM
EKG P AXIS: 59 DEGREES
EKG P-R INTERVAL: 186 MS
EKG Q-T INTERVAL: 402 MS
EKG QRS DURATION: 94 MS
EKG QTC CALCULATION (BAZETT): 452 MS
EKG R AXIS: -9 DEGREES
EKG T AXIS: 57 DEGREES
EKG VENTRICULAR RATE: 76 BPM
GFR AFRICAN AMERICAN: >60 ML/MIN
GFR NON-AFRICAN AMERICAN: >60 ML/MIN
GFR SERPL CREATININE-BSD FRML MDRD: ABNORMAL ML/MIN/{1.73_M2}
GFR SERPL CREATININE-BSD FRML MDRD: ABNORMAL ML/MIN/{1.73_M2}
GLUCOSE BLD-MCNC: 113 MG/DL (ref 70–99)
HCT VFR BLD CALC: 42.8 % (ref 36.3–47.1)
HDLC SERPL-MCNC: 65 MG/DL
HEMOGLOBIN: 13.4 G/DL (ref 11.9–15.1)
LDL CHOLESTEROL: 45 MG/DL (ref 0–130)
LV EF: 65 %
LVEF MODALITY: NORMAL
MAGNESIUM: 1.7 MG/DL (ref 1.6–2.6)
MCH RBC QN AUTO: 29.7 PG (ref 25.2–33.5)
MCHC RBC AUTO-ENTMCNC: 31.3 G/DL (ref 28.4–34.8)
MCV RBC AUTO: 94.9 FL (ref 82.6–102.9)
NRBC AUTOMATED: 0 PER 100 WBC
PDW BLD-RTO: 13.7 % (ref 11.8–14.4)
PLATELET # BLD: 202 K/UL (ref 138–453)
PMV BLD AUTO: 10.5 FL (ref 8.1–13.5)
POTASSIUM SERPL-SCNC: 4.1 MMOL/L (ref 3.7–5.3)
PRO-BNP: 300 PG/ML
RBC # BLD: 4.51 M/UL (ref 3.95–5.11)
SODIUM BLD-SCNC: 138 MMOL/L (ref 135–144)
TOTAL PROTEIN: 7.6 G/DL (ref 6.4–8.3)
TRIGL SERPL-MCNC: 95 MG/DL
VLDLC SERPL CALC-MCNC: NORMAL MG/DL (ref 1–30)
WBC # BLD: 8.2 K/UL (ref 3.5–11.3)

## 2019-02-09 PROCEDURE — 83735 ASSAY OF MAGNESIUM: CPT

## 2019-02-09 PROCEDURE — 85027 COMPLETE CBC AUTOMATED: CPT

## 2019-02-09 PROCEDURE — 93306 TTE W/DOPPLER COMPLETE: CPT

## 2019-02-09 PROCEDURE — 36415 COLL VENOUS BLD VENIPUNCTURE: CPT

## 2019-02-09 PROCEDURE — 83880 ASSAY OF NATRIURETIC PEPTIDE: CPT

## 2019-02-09 PROCEDURE — 2580000003 HC RX 258: Performed by: INTERNAL MEDICINE

## 2019-02-09 PROCEDURE — 6370000000 HC RX 637 (ALT 250 FOR IP): Performed by: INTERNAL MEDICINE

## 2019-02-09 PROCEDURE — 2060000000 HC ICU INTERMEDIATE R&B

## 2019-02-09 PROCEDURE — 6360000002 HC RX W HCPCS: Performed by: INTERNAL MEDICINE

## 2019-02-09 PROCEDURE — 6370000000 HC RX 637 (ALT 250 FOR IP): Performed by: STUDENT IN AN ORGANIZED HEALTH CARE EDUCATION/TRAINING PROGRAM

## 2019-02-09 PROCEDURE — 80053 COMPREHEN METABOLIC PANEL: CPT

## 2019-02-09 PROCEDURE — 99232 SBSQ HOSP IP/OBS MODERATE 35: CPT | Performed by: INTERNAL MEDICINE

## 2019-02-09 PROCEDURE — 93005 ELECTROCARDIOGRAM TRACING: CPT

## 2019-02-09 PROCEDURE — 80061 LIPID PANEL: CPT

## 2019-02-09 RX ORDER — LOSARTAN POTASSIUM 50 MG/1
50 TABLET ORAL DAILY
Status: DISCONTINUED | OUTPATIENT
Start: 2019-02-09 | End: 2019-02-11 | Stop reason: HOSPADM

## 2019-02-09 RX ORDER — OXYCODONE HYDROCHLORIDE AND ACETAMINOPHEN 5; 325 MG/1; MG/1
1 TABLET ORAL EVERY 8 HOURS PRN
Status: DISCONTINUED | OUTPATIENT
Start: 2019-02-09 | End: 2019-02-10

## 2019-02-09 RX ORDER — ACETAMINOPHEN 325 MG/1
650 TABLET ORAL EVERY 4 HOURS PRN
Status: DISCONTINUED | OUTPATIENT
Start: 2019-02-09 | End: 2019-02-09

## 2019-02-09 RX ORDER — ISOSORBIDE MONONITRATE 30 MG/1
30 TABLET, EXTENDED RELEASE ORAL DAILY
Status: DISCONTINUED | OUTPATIENT
Start: 2019-02-09 | End: 2019-02-10

## 2019-02-09 RX ORDER — OXYMETAZOLINE HYDROCHLORIDE 0.05 G/100ML
2 SPRAY NASAL 2 TIMES DAILY
Status: DISCONTINUED | OUTPATIENT
Start: 2019-02-09 | End: 2019-02-11 | Stop reason: HOSPADM

## 2019-02-09 RX ORDER — ECHINACEA PURPUREA EXTRACT 125 MG
1 TABLET ORAL 3 TIMES DAILY
Status: DISCONTINUED | OUTPATIENT
Start: 2019-02-09 | End: 2019-02-11 | Stop reason: HOSPADM

## 2019-02-09 RX ADMIN — LOSARTAN POTASSIUM 50 MG: 50 TABLET, FILM COATED ORAL at 09:05

## 2019-02-09 RX ADMIN — Medication 2 SPRAY: at 20:56

## 2019-02-09 RX ADMIN — ISOSORBIDE MONONITRATE 30 MG: 30 TABLET ORAL at 11:43

## 2019-02-09 RX ADMIN — ASPIRIN 81 MG: 81 TABLET ORAL at 09:07

## 2019-02-09 RX ADMIN — Medication 10 ML: at 09:05

## 2019-02-09 RX ADMIN — SALINE NASAL SPRAY 1 SPRAY: 1.5 SOLUTION NASAL at 20:56

## 2019-02-09 RX ADMIN — MAGNESIUM SULFATE HEPTAHYDRATE 1 G: 1 INJECTION, SOLUTION INTRAVENOUS at 12:15

## 2019-02-09 RX ADMIN — FAMOTIDINE 20 MG: 20 TABLET, FILM COATED ORAL at 09:05

## 2019-02-09 RX ADMIN — APIXABAN 5 MG: 5 TABLET, FILM COATED ORAL at 11:43

## 2019-02-09 RX ADMIN — ATORVASTATIN CALCIUM 80 MG: 80 TABLET, FILM COATED ORAL at 21:21

## 2019-02-09 RX ADMIN — OXYCODONE HYDROCHLORIDE AND ACETAMINOPHEN 1 TABLET: 5; 325 TABLET ORAL at 19:19

## 2019-02-09 RX ADMIN — Medication 10 ML: at 21:21

## 2019-02-09 RX ADMIN — MAGNESIUM SULFATE HEPTAHYDRATE 1 G: 1 INJECTION, SOLUTION INTRAVENOUS at 11:02

## 2019-02-09 RX ADMIN — METOPROLOL TARTRATE 25 MG: 25 TABLET ORAL at 09:05

## 2019-02-09 RX ADMIN — METOPROLOL TARTRATE 25 MG: 25 TABLET ORAL at 21:21

## 2019-02-09 RX ADMIN — FAMOTIDINE 20 MG: 20 TABLET, FILM COATED ORAL at 21:21

## 2019-02-09 RX ADMIN — ACETAMINOPHEN 650 MG: 325 TABLET ORAL at 16:49

## 2019-02-09 RX ADMIN — FUROSEMIDE 20 MG: 20 TABLET ORAL at 09:05

## 2019-02-09 ASSESSMENT — PAIN SCALES - GENERAL
PAINLEVEL_OUTOF10: 9
PAINLEVEL_OUTOF10: 9
PAINLEVEL_OUTOF10: 0

## 2019-02-10 LAB
ANION GAP SERPL CALCULATED.3IONS-SCNC: 12 MMOL/L (ref 9–17)
BUN BLDV-MCNC: 14 MG/DL (ref 8–23)
BUN/CREAT BLD: ABNORMAL (ref 9–20)
CALCIUM SERPL-MCNC: 8.8 MG/DL (ref 8.6–10.4)
CHLORIDE BLD-SCNC: 104 MMOL/L (ref 98–107)
CO2: 24 MMOL/L (ref 20–31)
CREAT SERPL-MCNC: 0.83 MG/DL (ref 0.5–0.9)
GFR AFRICAN AMERICAN: >60 ML/MIN
GFR NON-AFRICAN AMERICAN: >60 ML/MIN
GFR SERPL CREATININE-BSD FRML MDRD: ABNORMAL ML/MIN/{1.73_M2}
GFR SERPL CREATININE-BSD FRML MDRD: ABNORMAL ML/MIN/{1.73_M2}
GLUCOSE BLD-MCNC: 137 MG/DL (ref 70–99)
HCT VFR BLD CALC: 39.1 % (ref 36.3–47.1)
HEMOGLOBIN: 12.4 G/DL (ref 11.9–15.1)
MCH RBC QN AUTO: 29.5 PG (ref 25.2–33.5)
MCHC RBC AUTO-ENTMCNC: 31.7 G/DL (ref 28.4–34.8)
MCV RBC AUTO: 93.1 FL (ref 82.6–102.9)
NRBC AUTOMATED: 0 PER 100 WBC
PDW BLD-RTO: 13.6 % (ref 11.8–14.4)
PLATELET # BLD: 171 K/UL (ref 138–453)
PMV BLD AUTO: 9.9 FL (ref 8.1–13.5)
POTASSIUM SERPL-SCNC: 3.9 MMOL/L (ref 3.7–5.3)
RBC # BLD: 4.2 M/UL (ref 3.95–5.11)
SODIUM BLD-SCNC: 140 MMOL/L (ref 135–144)
WBC # BLD: 8.1 K/UL (ref 3.5–11.3)

## 2019-02-10 PROCEDURE — 2060000000 HC ICU INTERMEDIATE R&B

## 2019-02-10 PROCEDURE — 85027 COMPLETE CBC AUTOMATED: CPT

## 2019-02-10 PROCEDURE — 6370000000 HC RX 637 (ALT 250 FOR IP): Performed by: INTERNAL MEDICINE

## 2019-02-10 PROCEDURE — 6370000000 HC RX 637 (ALT 250 FOR IP): Performed by: STUDENT IN AN ORGANIZED HEALTH CARE EDUCATION/TRAINING PROGRAM

## 2019-02-10 PROCEDURE — 2580000003 HC RX 258: Performed by: INTERNAL MEDICINE

## 2019-02-10 PROCEDURE — 36415 COLL VENOUS BLD VENIPUNCTURE: CPT

## 2019-02-10 PROCEDURE — 94760 N-INVAS EAR/PLS OXIMETRY 1: CPT

## 2019-02-10 PROCEDURE — 6360000002 HC RX W HCPCS: Performed by: INTERNAL MEDICINE

## 2019-02-10 PROCEDURE — 6370000000 HC RX 637 (ALT 250 FOR IP): Performed by: NURSE PRACTITIONER

## 2019-02-10 PROCEDURE — 99232 SBSQ HOSP IP/OBS MODERATE 35: CPT | Performed by: INTERNAL MEDICINE

## 2019-02-10 PROCEDURE — 80048 BASIC METABOLIC PNL TOTAL CA: CPT

## 2019-02-10 PROCEDURE — 6370000000 HC RX 637 (ALT 250 FOR IP): Performed by: OTOLARYNGOLOGY

## 2019-02-10 RX ORDER — ACETAMINOPHEN 325 MG/1
650 TABLET ORAL EVERY 4 HOURS PRN
Status: DISCONTINUED | OUTPATIENT
Start: 2019-02-10 | End: 2019-02-11 | Stop reason: HOSPADM

## 2019-02-10 RX ORDER — SODIUM CHLORIDE/ALOE VERA
GEL (GRAM) NASAL 2 TIMES DAILY
Status: DISCONTINUED | OUTPATIENT
Start: 2019-02-10 | End: 2019-02-11 | Stop reason: HOSPADM

## 2019-02-10 RX ORDER — AMLODIPINE BESYLATE 5 MG/1
5 TABLET ORAL DAILY
Status: DISCONTINUED | OUTPATIENT
Start: 2019-02-10 | End: 2019-02-11 | Stop reason: HOSPADM

## 2019-02-10 RX ORDER — OXYCODONE HYDROCHLORIDE 5 MG/1
5 TABLET ORAL EVERY 8 HOURS PRN
Status: DISCONTINUED | OUTPATIENT
Start: 2019-02-10 | End: 2019-02-11 | Stop reason: HOSPADM

## 2019-02-10 RX ORDER — RANOLAZINE 500 MG/1
500 TABLET, EXTENDED RELEASE ORAL 2 TIMES DAILY
Status: DISCONTINUED | OUTPATIENT
Start: 2019-02-10 | End: 2019-02-10

## 2019-02-10 RX ADMIN — LOSARTAN POTASSIUM 50 MG: 50 TABLET, FILM COATED ORAL at 08:57

## 2019-02-10 RX ADMIN — FAMOTIDINE 20 MG: 20 TABLET, FILM COATED ORAL at 20:06

## 2019-02-10 RX ADMIN — SALINE NASAL SPRAY 1 SPRAY: 1.5 SOLUTION NASAL at 20:06

## 2019-02-10 RX ADMIN — SALINE NASAL SPRAY 1 SPRAY: 1.5 SOLUTION NASAL at 08:58

## 2019-02-10 RX ADMIN — ATORVASTATIN CALCIUM 80 MG: 80 TABLET, FILM COATED ORAL at 20:06

## 2019-02-10 RX ADMIN — Medication 10 ML: at 08:58

## 2019-02-10 RX ADMIN — Medication: at 18:38

## 2019-02-10 RX ADMIN — METOPROLOL TARTRATE 25 MG: 25 TABLET ORAL at 08:58

## 2019-02-10 RX ADMIN — FAMOTIDINE 20 MG: 20 TABLET, FILM COATED ORAL at 08:57

## 2019-02-10 RX ADMIN — OXYCODONE HYDROCHLORIDE 5 MG: 5 TABLET ORAL at 19:56

## 2019-02-10 RX ADMIN — METOPROLOL TARTRATE 25 MG: 25 TABLET ORAL at 20:06

## 2019-02-10 RX ADMIN — ACETAMINOPHEN 650 MG: 325 TABLET ORAL at 02:19

## 2019-02-10 RX ADMIN — ONDANSETRON 4 MG: 2 INJECTION INTRAMUSCULAR; INTRAVENOUS at 19:57

## 2019-02-10 RX ADMIN — Medication 2 SPRAY: at 08:58

## 2019-02-10 RX ADMIN — APIXABAN 5 MG: 5 TABLET, FILM COATED ORAL at 20:06

## 2019-02-10 RX ADMIN — MAGNESIUM HYDROXIDE 30 ML: 400 SUSPENSION ORAL at 16:13

## 2019-02-10 RX ADMIN — AMLODIPINE BESYLATE 5 MG: 5 TABLET ORAL at 11:22

## 2019-02-10 RX ADMIN — ASPIRIN 81 MG: 81 TABLET ORAL at 10:48

## 2019-02-10 RX ADMIN — APIXABAN 5 MG: 5 TABLET, FILM COATED ORAL at 11:22

## 2019-02-10 RX ADMIN — Medication 10 ML: at 20:00

## 2019-02-10 RX ADMIN — Medication 2 SPRAY: at 20:06

## 2019-02-10 RX ADMIN — SALINE NASAL SPRAY 1 SPRAY: 1.5 SOLUTION NASAL at 14:52

## 2019-02-10 RX ADMIN — RANOLAZINE 500 MG: 500 TABLET, FILM COATED, EXTENDED RELEASE ORAL at 09:58

## 2019-02-10 RX ADMIN — FUROSEMIDE 20 MG: 20 TABLET ORAL at 08:57

## 2019-02-10 ASSESSMENT — PAIN SCALES - GENERAL
PAINLEVEL_OUTOF10: 7
PAINLEVEL_OUTOF10: 6
PAINLEVEL_OUTOF10: 6

## 2019-02-11 ENCOUNTER — APPOINTMENT (OUTPATIENT)
Dept: NUCLEAR MEDICINE | Age: 69
DRG: 303 | End: 2019-02-11
Attending: INTERNAL MEDICINE
Payer: MEDICARE

## 2019-02-11 VITALS
OXYGEN SATURATION: 97 % | RESPIRATION RATE: 18 BRPM | BODY MASS INDEX: 45.3 KG/M2 | SYSTOLIC BLOOD PRESSURE: 126 MMHG | TEMPERATURE: 98.2 F | DIASTOLIC BLOOD PRESSURE: 74 MMHG | WEIGHT: 271.9 LBS | HEART RATE: 77 BPM | HEIGHT: 65 IN

## 2019-02-11 LAB
LV EF: 70 %
LVEF MODALITY: NORMAL

## 2019-02-11 PROCEDURE — 2580000003 HC RX 258: Performed by: INTERNAL MEDICINE

## 2019-02-11 PROCEDURE — 94762 N-INVAS EAR/PLS OXIMTRY CONT: CPT

## 2019-02-11 PROCEDURE — 6370000000 HC RX 637 (ALT 250 FOR IP): Performed by: INTERNAL MEDICINE

## 2019-02-11 PROCEDURE — 3430000000 HC RX DIAGNOSTIC RADIOPHARMACEUTICAL: Performed by: INTERNAL MEDICINE

## 2019-02-11 PROCEDURE — A9500 TC99M SESTAMIBI: HCPCS | Performed by: INTERNAL MEDICINE

## 2019-02-11 PROCEDURE — 78452 HT MUSCLE IMAGE SPECT MULT: CPT

## 2019-02-11 PROCEDURE — 76937 US GUIDE VASCULAR ACCESS: CPT

## 2019-02-11 PROCEDURE — 6370000000 HC RX 637 (ALT 250 FOR IP): Performed by: STUDENT IN AN ORGANIZED HEALTH CARE EDUCATION/TRAINING PROGRAM

## 2019-02-11 PROCEDURE — 6360000002 HC RX W HCPCS: Performed by: INTERNAL MEDICINE

## 2019-02-11 PROCEDURE — 93017 CV STRESS TEST TRACING ONLY: CPT | Performed by: NURSE PRACTITIONER

## 2019-02-11 PROCEDURE — 99239 HOSP IP/OBS DSCHRG MGMT >30: CPT | Performed by: INTERNAL MEDICINE

## 2019-02-11 RX ORDER — AMINOPHYLLINE DIHYDRATE 25 MG/ML
100 INJECTION, SOLUTION INTRAVENOUS
Status: DISCONTINUED | OUTPATIENT
Start: 2019-02-11 | End: 2019-02-11 | Stop reason: RX

## 2019-02-11 RX ORDER — SODIUM CHLORIDE 9 MG/ML
INJECTION, SOLUTION INTRAVENOUS ONCE
Status: DISCONTINUED | OUTPATIENT
Start: 2019-02-11 | End: 2019-02-11

## 2019-02-11 RX ORDER — SODIUM CHLORIDE 0.9 % (FLUSH) 0.9 %
10 SYRINGE (ML) INJECTION 2 TIMES DAILY
Status: DISCONTINUED | OUTPATIENT
Start: 2019-02-11 | End: 2019-02-11 | Stop reason: HOSPADM

## 2019-02-11 RX ORDER — NITROGLYCERIN 0.4 MG/1
0.4 TABLET SUBLINGUAL EVERY 5 MIN PRN
Status: DISCONTINUED | OUTPATIENT
Start: 2019-02-11 | End: 2019-02-11

## 2019-02-11 RX ORDER — SODIUM CHLORIDE/ALOE VERA
GEL (GRAM) NASAL 2 TIMES DAILY
Qty: 1 TUBE | Refills: 3 | Status: ON HOLD | OUTPATIENT
Start: 2019-02-11 | End: 2019-04-08 | Stop reason: HOSPADM

## 2019-02-11 RX ORDER — LOSARTAN POTASSIUM 50 MG/1
50 TABLET ORAL DAILY
Qty: 30 TABLET | Refills: 3 | Status: SHIPPED | OUTPATIENT
Start: 2019-02-12 | End: 2019-03-11 | Stop reason: ALTCHOICE

## 2019-02-11 RX ORDER — OXYMETAZOLINE HYDROCHLORIDE 0.05 G/100ML
2 SPRAY NASAL 2 TIMES DAILY
Qty: 1 BOTTLE | Refills: 0 | Status: SHIPPED | OUTPATIENT
Start: 2019-02-11 | End: 2019-03-13

## 2019-02-11 RX ORDER — SODIUM CHLORIDE 0.9 % (FLUSH) 0.9 %
10 SYRINGE (ML) INJECTION PRN
Status: DISCONTINUED | OUTPATIENT
Start: 2019-02-11 | End: 2019-02-11

## 2019-02-11 RX ORDER — AMLODIPINE BESYLATE 5 MG/1
5 TABLET ORAL DAILY
Qty: 30 TABLET | Refills: 3 | Status: SHIPPED | OUTPATIENT
Start: 2019-02-12 | End: 2019-02-26 | Stop reason: SDUPTHER

## 2019-02-11 RX ORDER — METOPROLOL TARTRATE 5 MG/5ML
2.5 INJECTION INTRAVENOUS PRN
Status: DISCONTINUED | OUTPATIENT
Start: 2019-02-11 | End: 2019-02-11

## 2019-02-11 RX ADMIN — Medication 10 ML: at 10:23

## 2019-02-11 RX ADMIN — REGADENOSON 0.4 MG: 0.08 INJECTION, SOLUTION INTRAVENOUS at 10:57

## 2019-02-11 RX ADMIN — Medication: at 12:42

## 2019-02-11 RX ADMIN — METOPROLOL TARTRATE 25 MG: 25 TABLET ORAL at 12:40

## 2019-02-11 RX ADMIN — ASPIRIN 81 MG: 81 TABLET ORAL at 12:41

## 2019-02-11 RX ADMIN — FAMOTIDINE 20 MG: 20 TABLET, FILM COATED ORAL at 12:41

## 2019-02-11 RX ADMIN — SODIUM CHLORIDE, PRESERVATIVE FREE 10 ML: 5 INJECTION INTRAVENOUS at 07:40

## 2019-02-11 RX ADMIN — Medication 2 SPRAY: at 12:42

## 2019-02-11 RX ADMIN — AMLODIPINE BESYLATE 5 MG: 5 TABLET ORAL at 12:41

## 2019-02-11 RX ADMIN — APIXABAN 5 MG: 5 TABLET, FILM COATED ORAL at 12:41

## 2019-02-11 RX ADMIN — SODIUM CHLORIDE, PRESERVATIVE FREE 10 ML: 5 INJECTION INTRAVENOUS at 10:57

## 2019-02-11 RX ADMIN — FUROSEMIDE 20 MG: 20 TABLET ORAL at 12:41

## 2019-02-11 RX ADMIN — TETRAKIS(2-METHOXYISOBUTYLISOCYANIDE)COPPER(I) TETRAFLUOROBORATE 43 MILLICURIE: 1 INJECTION, POWDER, LYOPHILIZED, FOR SOLUTION INTRAVENOUS at 10:57

## 2019-02-11 RX ADMIN — Medication: at 18:19

## 2019-02-11 RX ADMIN — LOSARTAN POTASSIUM 50 MG: 50 TABLET, FILM COATED ORAL at 12:41

## 2019-02-11 RX ADMIN — SALINE NASAL SPRAY 1 SPRAY: 1.5 SOLUTION NASAL at 12:42

## 2019-02-11 RX ADMIN — TETRAKIS(2-METHOXYISOBUTYLISOCYANIDE)COPPER(I) TETRAFLUOROBORATE 19.8 MILLICURIE: 1 INJECTION, POWDER, LYOPHILIZED, FOR SOLUTION INTRAVENOUS at 07:40

## 2019-02-11 ASSESSMENT — PAIN SCALES - GENERAL
PAINLEVEL_OUTOF10: 4
PAINLEVEL_OUTOF10: 4

## 2019-02-13 ENCOUNTER — TELEPHONE (OUTPATIENT)
Dept: INTERNAL MEDICINE | Age: 69
End: 2019-02-13

## 2019-02-26 RX ORDER — AMLODIPINE BESYLATE 5 MG/1
TABLET ORAL
Qty: 90 TABLET | Refills: 3 | Status: SHIPPED | OUTPATIENT
Start: 2019-02-26 | End: 2019-03-11 | Stop reason: SDUPTHER

## 2019-03-11 ENCOUNTER — HOSPITAL ENCOUNTER (OUTPATIENT)
Age: 69
Discharge: HOME | End: 2019-03-11
Payer: MEDICARE

## 2019-03-11 ENCOUNTER — CARE COORDINATION (OUTPATIENT)
Dept: CARE COORDINATION | Age: 69
End: 2019-03-11

## 2019-03-11 ENCOUNTER — HOSPITAL ENCOUNTER (OUTPATIENT)
Dept: CT IMAGING | Age: 69
Discharge: HOME | End: 2019-03-13
Payer: MEDICARE

## 2019-03-11 ENCOUNTER — OFFICE VISIT (OUTPATIENT)
Dept: INTERNAL MEDICINE | Age: 69
End: 2019-03-11
Payer: MEDICARE

## 2019-03-11 VITALS
SYSTOLIC BLOOD PRESSURE: 127 MMHG | BODY MASS INDEX: 46.88 KG/M2 | DIASTOLIC BLOOD PRESSURE: 77 MMHG | HEART RATE: 66 BPM | WEIGHT: 281.4 LBS | HEIGHT: 65 IN

## 2019-03-11 DIAGNOSIS — R04.0 EPISTAXIS, RECURRENT: Primary | ICD-10-CM

## 2019-03-11 DIAGNOSIS — N17.9 AKI (ACUTE KIDNEY INJURY) (HCC): ICD-10-CM

## 2019-03-11 DIAGNOSIS — E66.01 MORBID OBESITY WITH BMI OF 45.0-49.9, ADULT (HCC): ICD-10-CM

## 2019-03-11 DIAGNOSIS — R04.0 EPISTAXIS: ICD-10-CM

## 2019-03-11 DIAGNOSIS — Z12.11 COLON CANCER SCREENING: Primary | ICD-10-CM

## 2019-03-11 DIAGNOSIS — Z79.01 LONG TERM (CURRENT) USE OF ANTICOAGULANTS: ICD-10-CM

## 2019-03-11 DIAGNOSIS — D32.9 MENINGIOMA (HCC): ICD-10-CM

## 2019-03-11 DIAGNOSIS — I50.32 CHRONIC DIASTOLIC CONGESTIVE HEART FAILURE (HCC): ICD-10-CM

## 2019-03-11 DIAGNOSIS — I48.20 CHRONIC ATRIAL FIBRILLATION (HCC): ICD-10-CM

## 2019-03-11 DIAGNOSIS — I10 ESSENTIAL HYPERTENSION: ICD-10-CM

## 2019-03-11 DIAGNOSIS — E11.42 TYPE 2 DIABETES MELLITUS WITH DIABETIC POLYNEUROPATHY, WITHOUT LONG-TERM CURRENT USE OF INSULIN (HCC): ICD-10-CM

## 2019-03-11 LAB
BUN BLDV-MCNC: 23 MG/DL (ref 8–23)
CREAT SERPL-MCNC: 1.11 MG/DL (ref 0.5–0.9)
GFR AFRICAN AMERICAN: 59 ML/MIN
GFR NON-AFRICAN AMERICAN: 49 ML/MIN
GFR SERPL CREATININE-BSD FRML MDRD: ABNORMAL ML/MIN/{1.73_M2}
GFR SERPL CREATININE-BSD FRML MDRD: ABNORMAL ML/MIN/{1.73_M2}

## 2019-03-11 PROCEDURE — 1036F TOBACCO NON-USER: CPT | Performed by: INTERNAL MEDICINE

## 2019-03-11 PROCEDURE — 1123F ACP DISCUSS/DSCN MKR DOCD: CPT | Performed by: INTERNAL MEDICINE

## 2019-03-11 PROCEDURE — 1090F PRES/ABSN URINE INCON ASSESS: CPT | Performed by: INTERNAL MEDICINE

## 2019-03-11 PROCEDURE — 4040F PNEUMOC VAC/ADMIN/RCVD: CPT | Performed by: INTERNAL MEDICINE

## 2019-03-11 PROCEDURE — 70487 CT MAXILLOFACIAL W/DYE: CPT

## 2019-03-11 PROCEDURE — G8417 CALC BMI ABV UP PARAM F/U: HCPCS | Performed by: INTERNAL MEDICINE

## 2019-03-11 PROCEDURE — 99214 OFFICE O/P EST MOD 30 MIN: CPT | Performed by: INTERNAL MEDICINE

## 2019-03-11 PROCEDURE — 99211 OFF/OP EST MAY X REQ PHY/QHP: CPT | Performed by: INTERNAL MEDICINE

## 2019-03-11 PROCEDURE — G8399 PT W/DXA RESULTS DOCUMENT: HCPCS | Performed by: INTERNAL MEDICINE

## 2019-03-11 PROCEDURE — G8598 ASA/ANTIPLAT THER USED: HCPCS | Performed by: INTERNAL MEDICINE

## 2019-03-11 PROCEDURE — 2022F DILAT RTA XM EVC RTNOPTHY: CPT | Performed by: INTERNAL MEDICINE

## 2019-03-11 PROCEDURE — 1111F DSCHRG MED/CURRENT MED MERGE: CPT | Performed by: INTERNAL MEDICINE

## 2019-03-11 PROCEDURE — 3017F COLORECTAL CA SCREEN DOC REV: CPT | Performed by: INTERNAL MEDICINE

## 2019-03-11 PROCEDURE — 3046F HEMOGLOBIN A1C LEVEL >9.0%: CPT | Performed by: INTERNAL MEDICINE

## 2019-03-11 PROCEDURE — G8484 FLU IMMUNIZE NO ADMIN: HCPCS | Performed by: INTERNAL MEDICINE

## 2019-03-11 PROCEDURE — G8427 DOCREV CUR MEDS BY ELIG CLIN: HCPCS | Performed by: INTERNAL MEDICINE

## 2019-03-11 PROCEDURE — 82565 ASSAY OF CREATININE: CPT

## 2019-03-11 PROCEDURE — 1101F PT FALLS ASSESS-DOCD LE1/YR: CPT | Performed by: INTERNAL MEDICINE

## 2019-03-11 PROCEDURE — 6360000004 HC RX CONTRAST MEDICATION: Performed by: OTOLARYNGOLOGY

## 2019-03-11 PROCEDURE — 84520 ASSAY OF UREA NITROGEN: CPT

## 2019-03-11 RX ORDER — AMLODIPINE BESYLATE 10 MG/1
10 TABLET ORAL DAILY
Qty: 30 TABLET | Refills: 5 | Status: SHIPPED | OUTPATIENT
Start: 2019-03-11 | End: 2019-05-30 | Stop reason: SDUPTHER

## 2019-03-11 RX ADMIN — IOVERSOL 75 ML: 741 INJECTION INTRA-ARTERIAL; INTRAVENOUS at 08:45

## 2019-03-11 ASSESSMENT — ENCOUNTER SYMPTOMS
EYE PAIN: 1
ALLERGIC/IMMUNOLOGIC NEGATIVE: 1
RESPIRATORY NEGATIVE: 1
CONSTIPATION: 1
NAUSEA: 1

## 2019-03-20 ENCOUNTER — HOSPITAL ENCOUNTER (OUTPATIENT)
Dept: PREADMISSION TESTING | Age: 69
Discharge: HOME | End: 2019-03-24
Payer: MEDICARE

## 2019-03-20 ENCOUNTER — HOSPITAL ENCOUNTER (OUTPATIENT)
Age: 69
Discharge: HOME | End: 2019-03-20
Payer: MEDICARE

## 2019-03-20 VITALS
HEIGHT: 65 IN | TEMPERATURE: 97.8 F | WEIGHT: 278 LBS | DIASTOLIC BLOOD PRESSURE: 72 MMHG | OXYGEN SATURATION: 100 % | BODY MASS INDEX: 46.32 KG/M2 | SYSTOLIC BLOOD PRESSURE: 109 MMHG | RESPIRATION RATE: 16 BRPM | HEART RATE: 64 BPM

## 2019-03-20 DIAGNOSIS — N17.9 AKI (ACUTE KIDNEY INJURY) (HCC): ICD-10-CM

## 2019-03-20 DIAGNOSIS — R04.0 EPISTAXIS: ICD-10-CM

## 2019-03-20 DIAGNOSIS — E11.42 TYPE 2 DIABETES MELLITUS WITH DIABETIC POLYNEUROPATHY, WITHOUT LONG-TERM CURRENT USE OF INSULIN (HCC): ICD-10-CM

## 2019-03-20 LAB
ABSOLUTE EOS #: 0.09 K/UL (ref 0–0.44)
ABSOLUTE IMMATURE GRANULOCYTE: <0.03 K/UL (ref 0–0.3)
ABSOLUTE LYMPH #: 2.42 K/UL (ref 1.1–3.7)
ABSOLUTE MONO #: 0.61 K/UL (ref 0.1–1.2)
ALP BLD-CCNC: 105 U/L (ref 35–104)
ALT SERPL-CCNC: 16 U/L (ref 5–33)
ANION GAP SERPL CALCULATED.3IONS-SCNC: 18 MMOL/L (ref 9–17)
BASOPHILS # BLD: 0 % (ref 0–2)
BASOPHILS ABSOLUTE: <0.03 K/UL (ref 0–0.2)
BUN BLDV-MCNC: 24 MG/DL (ref 8–23)
BUN/CREAT BLD: ABNORMAL (ref 9–20)
CALCIUM SERPL-MCNC: 9.6 MG/DL (ref 8.6–10.4)
CHLORIDE BLD-SCNC: 105 MMOL/L (ref 98–107)
CO2: 23 MMOL/L (ref 20–31)
CREAT SERPL-MCNC: 1.01 MG/DL (ref 0.5–0.9)
CREATININE URINE: 164 MG/DL (ref 28–217)
DIFFERENTIAL TYPE: NORMAL
EOSINOPHILS RELATIVE PERCENT: 1 % (ref 1–4)
GFR AFRICAN AMERICAN: >60 ML/MIN
GFR NON-AFRICAN AMERICAN: 54 ML/MIN
GFR SERPL CREATININE-BSD FRML MDRD: ABNORMAL ML/MIN/{1.73_M2}
GFR SERPL CREATININE-BSD FRML MDRD: ABNORMAL ML/MIN/{1.73_M2}
GLUCOSE BLD-MCNC: 85 MG/DL (ref 70–99)
HCT VFR BLD CALC: 39.8 % (ref 36.3–47.1)
HEMOGLOBIN: 12.1 G/DL (ref 11.9–15.1)
IMMATURE GRANULOCYTES: 0 %
LYMPHOCYTES # BLD: 32 % (ref 24–43)
MCH RBC QN AUTO: 28.8 PG (ref 25.2–33.5)
MCHC RBC AUTO-ENTMCNC: 30.4 G/DL (ref 28.4–34.8)
MCV RBC AUTO: 94.8 FL (ref 82.6–102.9)
MICROALBUMIN/CREAT 24H UR: <12 MG/L
MICROALBUMIN/CREAT UR-RTO: NORMAL MCG/MG CREAT
MONOCYTES # BLD: 8 % (ref 3–12)
NRBC AUTOMATED: 0 PER 100 WBC
PDW BLD-RTO: 13.7 % (ref 11.8–14.4)
PLATELET # BLD: 201 K/UL (ref 138–453)
PLATELET ESTIMATE: NORMAL
PMV BLD AUTO: 10.4 FL (ref 8.1–13.5)
POTASSIUM SERPL-SCNC: 4.5 MMOL/L (ref 3.7–5.3)
RBC # BLD: 4.2 M/UL (ref 3.95–5.11)
RBC # BLD: NORMAL 10*6/UL
SEG NEUTROPHILS: 59 % (ref 36–65)
SEGMENTED NEUTROPHILS ABSOLUTE COUNT: 4.4 K/UL (ref 1.5–8.1)
SODIUM BLD-SCNC: 146 MMOL/L (ref 135–144)
WBC # BLD: 7.6 K/UL (ref 3.5–11.3)
WBC # BLD: NORMAL 10*3/UL

## 2019-03-20 PROCEDURE — 36415 COLL VENOUS BLD VENIPUNCTURE: CPT

## 2019-03-20 PROCEDURE — 84075 ASSAY ALKALINE PHOSPHATASE: CPT

## 2019-03-20 PROCEDURE — 80048 BASIC METABOLIC PNL TOTAL CA: CPT

## 2019-03-20 PROCEDURE — 82043 UR ALBUMIN QUANTITATIVE: CPT

## 2019-03-20 PROCEDURE — 85025 COMPLETE CBC W/AUTO DIFF WBC: CPT

## 2019-03-20 PROCEDURE — 84460 ALANINE AMINO (ALT) (SGPT): CPT

## 2019-03-20 PROCEDURE — 82570 ASSAY OF URINE CREATININE: CPT

## 2019-03-20 RX ORDER — GINSENG 100 MG
CAPSULE ORAL DAILY
Status: ON HOLD | COMMUNITY
End: 2019-04-08 | Stop reason: HOSPADM

## 2019-03-20 RX ORDER — SODIUM CHLORIDE, SODIUM LACTATE, POTASSIUM CHLORIDE, CALCIUM CHLORIDE 600; 310; 30; 20 MG/100ML; MG/100ML; MG/100ML; MG/100ML
1000 INJECTION, SOLUTION INTRAVENOUS CONTINUOUS
Status: CANCELLED | OUTPATIENT
Start: 2019-03-20

## 2019-03-20 SDOH — HEALTH STABILITY: MENTAL HEALTH: HOW OFTEN DO YOU HAVE A DRINK CONTAINING ALCOHOL?: NEVER

## 2019-03-28 ENCOUNTER — OFFICE VISIT (OUTPATIENT)
Dept: INTERNAL MEDICINE | Age: 69
End: 2019-03-28
Payer: MEDICARE

## 2019-03-28 VITALS
BODY MASS INDEX: 46.15 KG/M2 | DIASTOLIC BLOOD PRESSURE: 78 MMHG | HEART RATE: 97 BPM | WEIGHT: 277 LBS | SYSTOLIC BLOOD PRESSURE: 134 MMHG | HEIGHT: 65 IN | OXYGEN SATURATION: 98 %

## 2019-03-28 DIAGNOSIS — Z12.11 COLON CANCER SCREENING: ICD-10-CM

## 2019-03-28 DIAGNOSIS — E11.42 TYPE 2 DIABETES MELLITUS WITH DIABETIC POLYNEUROPATHY, WITHOUT LONG-TERM CURRENT USE OF INSULIN (HCC): ICD-10-CM

## 2019-03-28 LAB
CONTROL: NORMAL
HEMOCCULT STL QL: NORMAL

## 2019-03-28 PROCEDURE — 3017F COLORECTAL CA SCREEN DOC REV: CPT | Performed by: INTERNAL MEDICINE

## 2019-03-28 PROCEDURE — 99213 OFFICE O/P EST LOW 20 MIN: CPT | Performed by: INTERNAL MEDICINE

## 2019-03-28 PROCEDURE — G8399 PT W/DXA RESULTS DOCUMENT: HCPCS | Performed by: INTERNAL MEDICINE

## 2019-03-28 PROCEDURE — 3046F HEMOGLOBIN A1C LEVEL >9.0%: CPT | Performed by: INTERNAL MEDICINE

## 2019-03-28 PROCEDURE — G8598 ASA/ANTIPLAT THER USED: HCPCS | Performed by: INTERNAL MEDICINE

## 2019-03-28 PROCEDURE — 99211 OFF/OP EST MAY X REQ PHY/QHP: CPT | Performed by: INTERNAL MEDICINE

## 2019-03-28 PROCEDURE — 2022F DILAT RTA XM EVC RTNOPTHY: CPT | Performed by: INTERNAL MEDICINE

## 2019-03-28 PROCEDURE — 1036F TOBACCO NON-USER: CPT | Performed by: INTERNAL MEDICINE

## 2019-03-28 PROCEDURE — G8484 FLU IMMUNIZE NO ADMIN: HCPCS | Performed by: INTERNAL MEDICINE

## 2019-03-28 PROCEDURE — 1123F ACP DISCUSS/DSCN MKR DOCD: CPT | Performed by: INTERNAL MEDICINE

## 2019-03-28 PROCEDURE — G8427 DOCREV CUR MEDS BY ELIG CLIN: HCPCS | Performed by: INTERNAL MEDICINE

## 2019-03-28 PROCEDURE — 4040F PNEUMOC VAC/ADMIN/RCVD: CPT | Performed by: INTERNAL MEDICINE

## 2019-03-28 PROCEDURE — G8417 CALC BMI ABV UP PARAM F/U: HCPCS | Performed by: INTERNAL MEDICINE

## 2019-03-28 PROCEDURE — 1090F PRES/ABSN URINE INCON ASSESS: CPT | Performed by: INTERNAL MEDICINE

## 2019-03-28 PROCEDURE — 82274 ASSAY TEST FOR BLOOD FECAL: CPT

## 2019-03-28 ASSESSMENT — ENCOUNTER SYMPTOMS
ALLERGIC/IMMUNOLOGIC NEGATIVE: 1
CONSTIPATION: 1
EYES NEGATIVE: 1

## 2019-04-07 ENCOUNTER — ANESTHESIA EVENT (OUTPATIENT)
Dept: OPERATING ROOM | Age: 69
End: 2019-04-07
Payer: MEDICARE

## 2019-04-08 ENCOUNTER — HOSPITAL ENCOUNTER (OUTPATIENT)
Age: 69
Setting detail: OUTPATIENT SURGERY
Discharge: HOME | End: 2019-04-08
Attending: OTOLARYNGOLOGY | Admitting: OTOLARYNGOLOGY
Payer: MEDICARE

## 2019-04-08 ENCOUNTER — ANESTHESIA (OUTPATIENT)
Dept: OPERATING ROOM | Age: 69
End: 2019-04-08
Payer: MEDICARE

## 2019-04-08 VITALS
OXYGEN SATURATION: 100 % | TEMPERATURE: 97.3 F | RESPIRATION RATE: 18 BRPM | DIASTOLIC BLOOD PRESSURE: 82 MMHG | BODY MASS INDEX: 46.28 KG/M2 | WEIGHT: 277.78 LBS | HEART RATE: 55 BPM | HEIGHT: 65 IN | SYSTOLIC BLOOD PRESSURE: 139 MMHG

## 2019-04-08 VITALS — OXYGEN SATURATION: 99 % | SYSTOLIC BLOOD PRESSURE: 105 MMHG | TEMPERATURE: 90 F | DIASTOLIC BLOOD PRESSURE: 67 MMHG

## 2019-04-08 LAB
GLUCOSE BLD-MCNC: 116 MG/DL (ref 65–105)
GLUCOSE BLD-MCNC: 128 MG/DL (ref 74–100)
POC POTASSIUM: 4.2 MMOL/L (ref 3.5–4.5)

## 2019-04-08 PROCEDURE — 6360000002 HC RX W HCPCS: Performed by: NURSE ANESTHETIST, CERTIFIED REGISTERED

## 2019-04-08 PROCEDURE — 2580000003 HC RX 258: Performed by: OTOLARYNGOLOGY

## 2019-04-08 PROCEDURE — 2580000003 HC RX 258: Performed by: NURSE ANESTHETIST, CERTIFIED REGISTERED

## 2019-04-08 PROCEDURE — 84132 ASSAY OF SERUM POTASSIUM: CPT

## 2019-04-08 PROCEDURE — 7100000041 HC SPAR PHASE II RECOVERY - ADDTL 15 MIN: Performed by: OTOLARYNGOLOGY

## 2019-04-08 PROCEDURE — 3600000014 HC SURGERY LEVEL 4 ADDTL 15MIN: Performed by: OTOLARYNGOLOGY

## 2019-04-08 PROCEDURE — 2500000003 HC RX 250 WO HCPCS: Performed by: NURSE ANESTHETIST, CERTIFIED REGISTERED

## 2019-04-08 PROCEDURE — 6370000000 HC RX 637 (ALT 250 FOR IP): Performed by: OTOLARYNGOLOGY

## 2019-04-08 PROCEDURE — 6360000002 HC RX W HCPCS: Performed by: ANESTHESIOLOGY

## 2019-04-08 PROCEDURE — 82947 ASSAY GLUCOSE BLOOD QUANT: CPT

## 2019-04-08 PROCEDURE — 3600000004 HC SURGERY LEVEL 4 BASE: Performed by: OTOLARYNGOLOGY

## 2019-04-08 PROCEDURE — 3700000000 HC ANESTHESIA ATTENDED CARE: Performed by: OTOLARYNGOLOGY

## 2019-04-08 PROCEDURE — 3700000001 HC ADD 15 MINUTES (ANESTHESIA): Performed by: OTOLARYNGOLOGY

## 2019-04-08 PROCEDURE — 7100000001 HC PACU RECOVERY - ADDTL 15 MIN: Performed by: OTOLARYNGOLOGY

## 2019-04-08 PROCEDURE — 7100000040 HC SPAR PHASE II RECOVERY - FIRST 15 MIN: Performed by: OTOLARYNGOLOGY

## 2019-04-08 PROCEDURE — 2709999900 HC NON-CHARGEABLE SUPPLY: Performed by: OTOLARYNGOLOGY

## 2019-04-08 PROCEDURE — 2580000003 HC RX 258: Performed by: ANESTHESIOLOGY

## 2019-04-08 PROCEDURE — 7100000000 HC PACU RECOVERY - FIRST 15 MIN: Performed by: OTOLARYNGOLOGY

## 2019-04-08 RX ORDER — MAGNESIUM HYDROXIDE 1200 MG/15ML
LIQUID ORAL CONTINUOUS PRN
Status: COMPLETED | OUTPATIENT
Start: 2019-04-08 | End: 2019-04-08

## 2019-04-08 RX ORDER — ONDANSETRON 2 MG/ML
INJECTION INTRAMUSCULAR; INTRAVENOUS PRN
Status: DISCONTINUED | OUTPATIENT
Start: 2019-04-08 | End: 2019-04-08 | Stop reason: SDUPTHER

## 2019-04-08 RX ORDER — FENTANYL CITRATE 50 UG/ML
25 INJECTION, SOLUTION INTRAMUSCULAR; INTRAVENOUS EVERY 5 MIN PRN
Status: DISCONTINUED | OUTPATIENT
Start: 2019-04-08 | End: 2019-04-08 | Stop reason: HOSPADM

## 2019-04-08 RX ORDER — ROCURONIUM BROMIDE 10 MG/ML
INJECTION, SOLUTION INTRAVENOUS PRN
Status: DISCONTINUED | OUTPATIENT
Start: 2019-04-08 | End: 2019-04-08 | Stop reason: SDUPTHER

## 2019-04-08 RX ORDER — LIDOCAINE HYDROCHLORIDE 10 MG/ML
INJECTION, SOLUTION EPIDURAL; INFILTRATION; INTRACAUDAL; PERINEURAL PRN
Status: DISCONTINUED | OUTPATIENT
Start: 2019-04-08 | End: 2019-04-08 | Stop reason: SDUPTHER

## 2019-04-08 RX ORDER — OXYMETAZOLINE HYDROCHLORIDE 0.05 G/100ML
2 SPRAY NASAL 2 TIMES DAILY PRN
Status: COMPLETED | OUTPATIENT
Start: 2019-04-08 | End: 2019-04-08

## 2019-04-08 RX ORDER — SODIUM CHLORIDE, SODIUM LACTATE, POTASSIUM CHLORIDE, CALCIUM CHLORIDE 600; 310; 30; 20 MG/100ML; MG/100ML; MG/100ML; MG/100ML
INJECTION, SOLUTION INTRAVENOUS CONTINUOUS PRN
Status: DISCONTINUED | OUTPATIENT
Start: 2019-04-08 | End: 2019-04-08 | Stop reason: SDUPTHER

## 2019-04-08 RX ORDER — FENTANYL CITRATE 50 UG/ML
50 INJECTION, SOLUTION INTRAMUSCULAR; INTRAVENOUS EVERY 5 MIN PRN
Status: DISCONTINUED | OUTPATIENT
Start: 2019-04-08 | End: 2019-04-08 | Stop reason: HOSPADM

## 2019-04-08 RX ORDER — LIDOCAINE HYDROCHLORIDE AND EPINEPHRINE 10; 10 MG/ML; UG/ML
INJECTION, SOLUTION INFILTRATION; PERINEURAL PRN
Status: DISCONTINUED | OUTPATIENT
Start: 2019-04-08 | End: 2019-04-08 | Stop reason: ALTCHOICE

## 2019-04-08 RX ORDER — PROPOFOL 10 MG/ML
INJECTION, EMULSION INTRAVENOUS PRN
Status: DISCONTINUED | OUTPATIENT
Start: 2019-04-08 | End: 2019-04-08 | Stop reason: SDUPTHER

## 2019-04-08 RX ORDER — SODIUM CHLORIDE, SODIUM LACTATE, POTASSIUM CHLORIDE, CALCIUM CHLORIDE 600; 310; 30; 20 MG/100ML; MG/100ML; MG/100ML; MG/100ML
1000 INJECTION, SOLUTION INTRAVENOUS CONTINUOUS
Status: DISCONTINUED | OUTPATIENT
Start: 2019-04-08 | End: 2019-04-08 | Stop reason: HOSPADM

## 2019-04-08 RX ORDER — ONDANSETRON 2 MG/ML
4 INJECTION INTRAMUSCULAR; INTRAVENOUS
Status: COMPLETED | OUTPATIENT
Start: 2019-04-08 | End: 2019-04-08

## 2019-04-08 RX ORDER — FENTANYL CITRATE 50 UG/ML
INJECTION, SOLUTION INTRAMUSCULAR; INTRAVENOUS PRN
Status: DISCONTINUED | OUTPATIENT
Start: 2019-04-08 | End: 2019-04-08 | Stop reason: SDUPTHER

## 2019-04-08 RX ORDER — GINSENG 100 MG
CAPSULE ORAL PRN
Status: DISCONTINUED | OUTPATIENT
Start: 2019-04-08 | End: 2019-04-08 | Stop reason: ALTCHOICE

## 2019-04-08 RX ORDER — MEPERIDINE HYDROCHLORIDE 50 MG/ML
12.5 INJECTION INTRAMUSCULAR; INTRAVENOUS; SUBCUTANEOUS EVERY 5 MIN PRN
Status: DISCONTINUED | OUTPATIENT
Start: 2019-04-08 | End: 2019-04-08 | Stop reason: HOSPADM

## 2019-04-08 RX ADMIN — PROPOFOL 50 MG: 10 INJECTION, EMULSION INTRAVENOUS at 10:58

## 2019-04-08 RX ADMIN — ROCURONIUM BROMIDE 50 MG: 10 INJECTION INTRAVENOUS at 10:56

## 2019-04-08 RX ADMIN — SODIUM CHLORIDE, POTASSIUM CHLORIDE, SODIUM LACTATE AND CALCIUM CHLORIDE: 600; 310; 30; 20 INJECTION, SOLUTION INTRAVENOUS at 10:50

## 2019-04-08 RX ADMIN — SODIUM CHLORIDE, POTASSIUM CHLORIDE, SODIUM LACTATE AND CALCIUM CHLORIDE 1000 ML: 600; 310; 30; 20 INJECTION, SOLUTION INTRAVENOUS at 09:47

## 2019-04-08 RX ADMIN — FENTANYL CITRATE 25 MCG: 50 INJECTION, SOLUTION INTRAMUSCULAR; INTRAVENOUS at 12:27

## 2019-04-08 RX ADMIN — Medication 2 SPRAY: at 09:27

## 2019-04-08 RX ADMIN — ONDANSETRON 4 MG: 2 INJECTION INTRAMUSCULAR; INTRAVENOUS at 11:58

## 2019-04-08 RX ADMIN — Medication 2 SPRAY: at 09:47

## 2019-04-08 RX ADMIN — ONDANSETRON 4 MG: 2 INJECTION, SOLUTION INTRAMUSCULAR; INTRAVENOUS at 11:14

## 2019-04-08 RX ADMIN — SUGAMMADEX 200 MG: 100 INJECTION, SOLUTION INTRAVENOUS at 11:20

## 2019-04-08 RX ADMIN — LIDOCAINE HYDROCHLORIDE 50 MG: 10 INJECTION, SOLUTION EPIDURAL; INFILTRATION; INTRACAUDAL; PERINEURAL at 10:55

## 2019-04-08 RX ADMIN — PROPOFOL 150 MG: 10 INJECTION, EMULSION INTRAVENOUS at 10:55

## 2019-04-08 RX ADMIN — SODIUM CHLORIDE, POTASSIUM CHLORIDE, SODIUM LACTATE AND CALCIUM CHLORIDE 1000 ML: 600; 310; 30; 20 INJECTION, SOLUTION INTRAVENOUS at 12:01

## 2019-04-08 RX ADMIN — FENTANYL CITRATE 50 MCG: 50 INJECTION, SOLUTION INTRAMUSCULAR; INTRAVENOUS at 10:58

## 2019-04-08 ASSESSMENT — PULMONARY FUNCTION TESTS
PIF_VALUE: 23
PIF_VALUE: 23
PIF_VALUE: 21
PIF_VALUE: 3
PIF_VALUE: 20
PIF_VALUE: 28
PIF_VALUE: 26
PIF_VALUE: 25
PIF_VALUE: 29
PIF_VALUE: 20
PIF_VALUE: 0
PIF_VALUE: 23
PIF_VALUE: 2
PIF_VALUE: 23
PIF_VALUE: 12
PIF_VALUE: 21
PIF_VALUE: 3
PIF_VALUE: 1
PIF_VALUE: 0
PIF_VALUE: 27
PIF_VALUE: 2
PIF_VALUE: 25
PIF_VALUE: 25
PIF_VALUE: 1
PIF_VALUE: 2
PIF_VALUE: 27
PIF_VALUE: 23
PIF_VALUE: 0
PIF_VALUE: 2
PIF_VALUE: 23
PIF_VALUE: 1
PIF_VALUE: 23
PIF_VALUE: 21
PIF_VALUE: 25
PIF_VALUE: 25
PIF_VALUE: 21
PIF_VALUE: 2
PIF_VALUE: 2
PIF_VALUE: 0

## 2019-04-08 ASSESSMENT — PAIN - FUNCTIONAL ASSESSMENT
PAIN_FUNCTIONAL_ASSESSMENT: PREVENTS OR INTERFERES SOME ACTIVE ACTIVITIES AND ADLS
PAIN_FUNCTIONAL_ASSESSMENT: 0-10
PAIN_FUNCTIONAL_ASSESSMENT: PREVENTS OR INTERFERES SOME ACTIVE ACTIVITIES AND ADLS

## 2019-04-08 ASSESSMENT — PAIN DESCRIPTION - LOCATION
LOCATION: NOSE

## 2019-04-08 ASSESSMENT — PAIN DESCRIPTION - PROGRESSION
CLINICAL_PROGRESSION: GRADUALLY WORSENING
CLINICAL_PROGRESSION: RAPIDLY IMPROVING
CLINICAL_PROGRESSION: NOT CHANGED

## 2019-04-08 ASSESSMENT — PAIN SCALES - WONG BAKER
WONGBAKER_NUMERICALRESPONSE: 2
WONGBAKER_NUMERICALRESPONSE: 0
WONGBAKER_NUMERICALRESPONSE: 0

## 2019-04-08 ASSESSMENT — PAIN DESCRIPTION - DESCRIPTORS
DESCRIPTORS: BURNING

## 2019-04-08 ASSESSMENT — PAIN DESCRIPTION - PAIN TYPE
TYPE: SURGICAL PAIN

## 2019-04-08 ASSESSMENT — PAIN DESCRIPTION - ONSET
ONSET: ON-GOING
ONSET: AWAKENED FROM SLEEP
ONSET: ON-GOING

## 2019-04-08 ASSESSMENT — PAIN SCALES - GENERAL
PAINLEVEL_OUTOF10: 5
PAINLEVEL_OUTOF10: 6
PAINLEVEL_OUTOF10: 3

## 2019-04-08 NOTE — ANESTHESIA POSTPROCEDURE EVALUATION
Department of Anesthesiology  Postprocedure Note    Patient: Justyn Zhao  MRN: [id number]  [place]: 1950  Date of evaluation: 4/8/2019  Time:  12:35 PM     Procedure Summary     Date:  04/08/19 Room / Location:  [email or web address] OR  / [facility] OR    Anesthesia Start:  1050 Anesthesia Stop:  1138    Procedures:       NASAL ENDOSCOPIC, NASAL CAUTERY (Right )      TURBINOPLASTY (Right ) Diagnosis:  (EPISTAXIS)    Surgeon:  Nathalie Feliciano MD Responsible Provider:  Kane Rocha MD    Anesthesia Type:  general ASA Status:  3          Anesthesia Type: general    Kathleen Phase I: Kathleen Score: 9    Kathleen Phase II:      Last vitals: Reviewed and per EMR flowsheets.    POST-OP ANESTHESIA NOTE       BP (!) 111/57   Pulse 73   Temp 96.8 °F (36 °C) (Temporal)   Resp 12   Ht 5' 5\" (1.651 m)   Wt 277 lb 12.5 oz (126 kg)   SpO2 99%   BMI 46.22 kg/m²    Pain Assessment: 0-10  Pain Level: 6         Anesthesia Post Evaluation    Patient location during evaluation: PACU  Patient participation: complete - patient participated  Level of consciousness: awake  Pain score: 6  Airway patency: patent  Nausea & Vomiting: no vomiting  Complications: no  Cardiovascular status: hemodynamically stable  Respiratory status: acceptable  Hydration status: stable

## 2019-04-08 NOTE — H&P
History and Physical Update    Pt Name: Roselyn Qureshi  MRN: [id number]  YOB: 1950  Date of evaluation: 4/8/2019    [x] I have examined the patient and reviewed the H&P/Consult and there are no changes to the patient or plans. [] I have examined the patient and reviewed the H&P/Consult and have noted the following changes:        [facility]  electronically signed 4/8/2019 at [address] [facility],  [address], [place]   Physician Assistant   General Surgery   H&P   Signed   Date of Service:  3/20/2019  1:11 PM          Related encounter: Pre-Admission Testing Visit from 3/20/2019 in [address] [place] [address] All          Show:Clear all  [x]Manual[x]Template[x]Copied    Added by:  Daryle Hartshorn, PA      []Jenn for details      History and Physical     Pt Name: Roselyn Qureshi  MRN: [id number]  YOB: 1950  Date of evaluation: 3/20/2019  Primary Care Physician: Oneida Llanes MD  Patient evaluated at the request of  Dr. Tanya Elias     Reason for evaluation:    Recurrent epistaxis  SUBJECTIVE:   History of Chief Complaint:   According to chart hx      Kinga He is a 70 y.o. female   With a hx of   Recurrent epistaxis,   She has been dealing with recurrent Rt nasal bleeds x2 weeks, underwent cautery about 2 weeks ago. Is on ASA and eliquis.     - prior h/o Afib on Eliquis since may 2018, CAD s/p stents in 2016, Migraine HA, HEMALATHA, DM2, HTN, HPL, GERD, OA, meningioma under surveillance       Her nose bleeds started in Jan/2019 of unknown cause , is on 2 blood thinners , denies nasal trauma , last episode of a nose bleed was this morning she reported .              Past Medical History       has a past medical history of HOSSEIN (acute kidney injury) ([address] Arthritis, Chronic diastolic congestive heart failure ([address] Chronic kidney disease, Coronary artery disease involving native coronary artery of native heart without angina pectoris, Epistaxis, GERD (gastroesophageal reflux disease), Headache, Hyperlipidemia, Hypertension, MI (myocardial infarction) ([facility] Neuromuscular disorder ([facility] Osteoarthritis, Patient in clinical research study, Sleep apnea, Snores, Type II or unspecified type diabetes mellitus without mention of complication, not stated as uncontrolled, Use of cane as ambulatory aid, and Wears contact lenses.     Past Surgical History   has a past surgical history that includes Hysterectomy (1979); Knee arthroscopy (Left); Total knee arthroplasty (Right, 07/21/2016); Coronary angioplasty with stent (2015); and Total knee arthroplasty (Left, 03/2016).          Medications   Scheduled Meds:  Current Outpatient Rx          Current Outpatient Rx   Medication Sig Dispense Refill    bacitracin 500 UNIT/GM ointment Apply topically daily Indications: nose Apply topically 2 times daily.        Fluticasone Propionate (FLONASE NA) by Nasal route daily as needed        amLODIPine (NORVASC) 10 MG tablet Take 1 tablet by mouth daily (Patient taking differently: Take 10 mg by mouth nightly ) 30 tablet 5    saline nasal gel (AYR) GEL by Nasal route 2 times daily 1 Tube 3    pantoprazole (PROTONIX) 40 MG tablet TAKE 1 TABLET EVERY MORNING (BEFORE BREAKFAST) 90 tablet 3    amitriptyline (ELAVIL) 50 MG tablet TAKE 1 TABLET EVERY NIGHT 90 tablet 3    metFORMIN (GLUCOPHAGE) 1000 MG tablet TAKE 1 TABLET TWICE DAILY WITH MEALS (Patient taking differently: daily) 180 tablet 3    ELIQUIS 5 MG TABS tablet TAKE 1 TABLET TWICE DAILY 180 tablet 3    metoprolol tartrate (LOPRESSOR) 25 MG tablet TAKE 1 TABLET TWICE DAILY 180 tablet 3    aspirin 81 MG tablet Take 1 tablet by mouth daily 30 tablet 3    furosemide (LASIX) 20 MG tablet Take 1 tablet by mouth daily 60 tablet 3    oxybutynin (DITROPAN) 5 MG tablet TAKE 1 TABLET EVERY DAY (Patient taking differently: Take 5 mg by mouth daily TAKE 1 TABLET EVERY DAY) 90 tablet 5    nitroGLYCERIN (NITROSTAT) 0.4 MG SL tablet Place 1 tablet under the tongue every 5 minutes as needed for Chest pain up to max of 3 total doses. If no relief after 1 dose, call 911. 25 tablet 3    atorvastatin (LIPITOR) 80 MG tablet Take 1 tablet by mouth nightly 30 tablet 3    magnesium hydroxide (MILK OF MAGNESIA) 400 MG/5ML suspension Take 5 mLs by mouth daily as needed for Constipation        blood glucose test strips (ONETOUCH VERIO) strip 1 each by In Vitro route daily Daily. She has a OneTouch Verio machine. Needs compatible test strips. 50 each 3    Lancets MISC 1 each by Does not apply route daily Use 3 times daily Diagnisis:250.0  Diabetes Mellitus Non-Insulin Dependent 100 each 11    Handicap Placard MISC by Does not apply route Dx: 18-23  AFIB 1 each 0         Continuous Infusions:  PRN Meds:. Allergies  is allergic to lisinopril and pcn [penicillins].    Family History     family history includes Heart Attack (age of onset: 79) in her brother; Heart Disease in her brother, father, and mother; Heart Surgery (age of onset: 39) in her father; Migraines in her sister;  Other in her son; Stroke in her brother and mother.           Family Status   Relation Name Status    Mother    at age 80         CVA    Father    at age 39    Sister [address]       MGM       MGF       [facility]       PGF       Sister Genise Alive    Sister   Alive    Chelsea   Alive    Son               Social History  Social History               Socioeconomic History    Marital status:        Spouse name: Not on file    Number of children: 2    Years of education: Not on file    Highest education level: Not on file   Occupational History    Occupation: retired   Social Needs    Financial resource strain: Not on file    Food insecurity:       Worry: Not on file       Inability: Not on file    Transportation needs:       Medical: Not on file       Non-medical: Not on file Tobacco Use    Smoking status: Never Smoker    Smokeless tobacco: Never Used   Substance and Sexual Activity    Alcohol use: Never       Frequency: Never    Drug use: Never    Sexual activity: Not on file   Lifestyle    Physical activity:       Days per week: Not on file       Minutes per session: Not on file    Stress: Not on file   Relationships    Social connections:       Talks on phone: Not on file       Gets together: Not on file       Attends [employer]ism service: Not on file       Active member of club or organization: Not on file       Attends meetings of clubs or organizations: Not on file       Relationship status: Not on file    Intimate partner violence:       Fear of current or ex partner: Not on file       Emotionally abused: Not on file       Physically abused: Not on file       Forced sexual activity: Not on file   Other Topics Concern    Not on file   Social History Narrative    Not on file                     Hobbies:  Grandmother , reads the Bible      OBJECTIVE:   VITALS:  vitals were not taken for this visit.      CONSTITUTIONAL: Alert and oriented times 3, no acute distress and cooperative to examination. friendly and pleasant      SKIN: rash No     HEENT: Head is normocephalic, atraumatic. EOMI, PERRLA     Oral air way :slightly narrow Yes     NECK: neck supple, no lymphadenopathy noted, trachea midline and straight       2+ carotid, no bruit     LUNGS: Chest expands equally bilaterally upon respiration, no accessory muscle used. Ausculation reveals no adventitious breath sounds.     CARDIOVASCULAR: \"Heart sounds are normal.  Regular rate and rhythm without murmur,     ABDOMEN: Bowel sounds are present in all four quadrants  , obese      GENATALIA:Deferred.     NEUROLOGIC: \"CN II-XII are grossly intact.         EXTREMITIES: Pitting edema:  No,  Varicose veins: No      Dorsal pedal/posterior tibial pulses palpable:  Yes            Strength:  Normal             Patient Active

## 2019-04-08 NOTE — BRIEF OP NOTE
Brief Postoperative Note  ______________________________________________________________    Patient: Lan Germain  YOB: 1950  MRN: [id number]  Date of Procedure: 4/8/2019    Pre-Op Diagnosis: EPISTAXIS    Post-Op Diagnosis: Same       Procedure(s):  RIGHT NASAL ENDOSCOPY, NASAL CAUTERY    Anesthesia: GET    Surgeon(s):  David Gama MD    Assistant: none    Estimated Blood Loss (mL): minimal    Complications: None    Findings: dictated    David Gama MD  Date: 4/8/2019  Time: 10:18 AM

## 2019-04-09 NOTE — OP NOTE
[facility] ([place], [place]) Solid, [address]                                OPERATIVE REPORT    PATIENT NAME: Waylon Barajas                   :        1950  MED REC NO:   [id number]                             ROOM:  ACCOUNT NO:   [de-identified]                           ADMIT DATE: 2019  PROVIDER:     Marychuy Howell    DATE OF PROCEDURE:  2019    PREOPERATIVE DIAGNOSES:  Hypertension, recurrent right epistaxis. POSTOPERATIVE DIAGNOSES:  Hypertension, recurrent right epistaxis. PROCEDURES:  1. Endoscopic right nasal floor cautery. 2.  Infracture of right inferior nasal turbinate. SURGEON:  oHmero Croft. George Avery MD    INDICATION:  This is a 65-year-old with a 3-month history of recurrent  right-sided epistaxis. Disease has persisted despite conservative  medical therapy including bedroom humidifier, bacitracin ointment, nasal  saline. Office examination including flexible endoscopy revealed an  area of hypervascularity about the right anterolateral nasal floor  underlying the inferior turbinate. Previous CT facial bones with  contrast was unremarkable. The patient now presents for surgical  procedure. FINDINGS:  4 mm in diameter, raised hypervascular nub of tissue was  identified about the right anterolateral nasal floor underlying the  inferior turbinate. TECHNIQUE:  The patient was brought to the operating room, placed in the  supine position. Inhalation anesthesia was induced via oral  endotracheal tube. The patient was draped in standard fashion. The  nose was decongested in the preoperative suite with Afrin-type nasal  spray. Anterior rhinoscopy revealed generalized mild-to-moderate pale  mucosal edema. The right inferior nasal turbinate was infractured. A  0-degree pediatric sinus endoscope was used to perform right nasal  endoscopy. Findings were as above.   Under direct endoscopic  visualization, the area of abnormality was vaporized with suction  electrocautery set on coag mode and a power of 20 liz. Bleeding was  minimal.  Firm NasoPore was then placed overlying the cautery site and  underlying the inferior turbinate. The inferior turbinate was then  gently outfractured/returned to its anatomic position. Repeat nasal  exam revealed no bleeding. Afrin-type nasal spray was placed. This  completed the procedures. The patient tolerated the surgery without  obvious complication. ANESTHESIA:  General endotracheal.    BLOOD GIVEN:  None. COMPLICATIONS:  None. DRAINS:  None. ESTIMATED BLOOD LOSS:  Minimal.    SPECIMENS:  None. DISPOSITION:  Postop recovery unit in stable condition.         Fadumo Agosto    D: 04/08/2019 11:48:28       T: 04/08/2019 11:50:35     [email or web address]  Job#: [id number]     Doc#: [id number]    CC:

## 2019-04-11 ENCOUNTER — TELEPHONE (OUTPATIENT)
Dept: INTERNAL MEDICINE | Age: 69
End: 2019-04-11

## 2019-04-15 NOTE — TELEPHONE ENCOUNTER
Can we have her follow up with ENT. Is this just a URI or post surgical infection. I can't treat over the phone given the complicated hx. She needs to be evaluated.

## 2019-05-30 DIAGNOSIS — E11.69 DIABETES MELLITUS TYPE 2 IN OBESE (HCC): ICD-10-CM

## 2019-05-30 DIAGNOSIS — E66.9 DIABETES MELLITUS TYPE 2 IN OBESE (HCC): ICD-10-CM

## 2019-05-30 DIAGNOSIS — F51.01 PRIMARY INSOMNIA: ICD-10-CM

## 2019-05-30 RX ORDER — FUROSEMIDE 20 MG/1
20 TABLET ORAL DAILY
Qty: 60 TABLET | Refills: 3 | Status: SHIPPED | OUTPATIENT
Start: 2019-05-30 | End: 2020-02-20

## 2019-05-30 RX ORDER — OXYBUTYNIN CHLORIDE 5 MG/1
TABLET ORAL
Qty: 90 TABLET | Refills: 5 | Status: SHIPPED | OUTPATIENT
Start: 2019-05-30 | End: 2019-10-10

## 2019-05-30 RX ORDER — LANCETS 30 GAUGE
1 EACH MISCELLANEOUS DAILY
Qty: 100 EACH | Refills: 11 | Status: SHIPPED | OUTPATIENT
Start: 2019-05-30

## 2019-05-30 RX ORDER — AMLODIPINE BESYLATE 10 MG/1
10 TABLET ORAL DAILY
Qty: 30 TABLET | Refills: 5 | Status: SHIPPED | OUTPATIENT
Start: 2019-05-30 | End: 2020-06-09

## 2019-05-30 RX ORDER — PANTOPRAZOLE SODIUM 40 MG/1
TABLET, DELAYED RELEASE ORAL
Qty: 90 TABLET | Refills: 3 | Status: SHIPPED | OUTPATIENT
Start: 2019-05-30 | End: 2020-06-18 | Stop reason: SDUPTHER

## 2019-05-30 RX ORDER — ATORVASTATIN CALCIUM 80 MG/1
80 TABLET, FILM COATED ORAL NIGHTLY
Qty: 30 TABLET | Refills: 3 | Status: SHIPPED | OUTPATIENT
Start: 2019-05-30 | End: 2019-12-13 | Stop reason: SDUPTHER

## 2019-05-30 RX ORDER — AMITRIPTYLINE HYDROCHLORIDE 50 MG/1
TABLET, FILM COATED ORAL
Qty: 90 TABLET | Refills: 3 | Status: SHIPPED | OUTPATIENT
Start: 2019-05-30 | End: 2020-02-20 | Stop reason: SDUPTHER

## 2019-05-30 NOTE — TELEPHONE ENCOUNTER
Patient calling requesting refill for medications be sent to [facility]. (She recently changed insurance and is not utilizing [address] any longer)  Orders are pended, please sign if appropriate. Health Maintenance   Topic Date Due    Shingles Vaccine (1 of 2) 09/12/1999    Diabetic retinal exam  01/15/2017    Diabetic foot exam  04/18/2019    A1C test (Diabetic or Prediabetic)  06/07/2019    Flu vaccine (Season Ended) 09/01/2019    Lipid screen  02/09/2020    Diabetic microalbuminuria test  03/20/2020    Creatinine monitoring  03/20/2020    Colon Cancer Screen FIT/FOBT  03/28/2020    Potassium monitoring  04/08/2020    Breast cancer screen  04/17/2020    DTaP/Tdap/Td vaccine (2 - Td) 09/03/2025    DEXA (modify frequency per FRAX score)  Completed    Pneumococcal 65+ years Vaccine  Completed    Hepatitis C screen  Completed             (applicable per patient's age: Cancer Screenings, Depression Screening, Fall Risk Screening, Immunizations)    Hemoglobin A1C (%)   Date Value   06/07/2018 6.8   09/26/2017 6.6   07/20/2017 6.6     Microalb/Crt.  Ratio (mcg/mg creat)   Date Value   03/20/2019 CANNOT BE CALCULATED     LDL Cholesterol (mg/dL)   Date Value   02/09/2019 45     AST (U/L)   Date Value   02/09/2019 13     ALT (U/L)   Date Value   03/20/2019 16     BUN (mg/dL)   Date Value   03/20/2019 24 (H)      (goal A1C is < 7)   (goal LDL is <100) need 30-50% reduction from baseline     BP Readings from Last 3 Encounters:   04/08/19 105/67   04/08/19 139/82   03/28/19 134/78    (goal /80)      All Future Testing planned in [facility]:  Lab Frequency Next Occurrence   Lipid, Fasting Once 06/06/2019       Next Visit Date:  Future Appointments   Date Time Provider Trang Love   9/26/2019  1:40 PM Robin Voss MD [facility] [address]            Patient Active Problem List:     Essential hypertension     Obstructive sleep apnea syndrome     Morbid obesity due to excess calories ([facility] Arthritis of knee     Cardiac murmur     Type 2 diabetes mellitus with diabetic polyneuropathy, without long-term current use of insulin (HCC)     Status post right knee replacement     Coronary artery disease involving native coronary artery of native heart with unstable angina pectoris (HCC)     Meningioma (HCC)     Chronic diastolic congestive heart failure (HCC)     Chest pain     First degree AV block     Chronic daily headache     Eczema     Unstable angina (HCC)     Chronic atrial fibrillation (HCC)     Epistaxis, recurrent

## 2019-10-10 ENCOUNTER — OFFICE VISIT (OUTPATIENT)
Dept: INTERNAL MEDICINE | Age: 69
End: 2019-10-10
Payer: MEDICARE

## 2019-10-10 VITALS
SYSTOLIC BLOOD PRESSURE: 134 MMHG | DIASTOLIC BLOOD PRESSURE: 75 MMHG | BODY MASS INDEX: 48.82 KG/M2 | WEIGHT: 293 LBS | HEIGHT: 65 IN | HEART RATE: 79 BPM

## 2019-10-10 DIAGNOSIS — N39.3 STRESS INCONTINENCE OF URINE: ICD-10-CM

## 2019-10-10 DIAGNOSIS — E11.42 TYPE 2 DIABETES MELLITUS WITH DIABETIC POLYNEUROPATHY, WITHOUT LONG-TERM CURRENT USE OF INSULIN (HCC): Primary | ICD-10-CM

## 2019-10-10 DIAGNOSIS — L73.9 FOLLICULITIS: ICD-10-CM

## 2019-10-10 DIAGNOSIS — I10 ESSENTIAL HYPERTENSION: ICD-10-CM

## 2019-10-10 DIAGNOSIS — G47.33 OBSTRUCTIVE SLEEP APNEA SYNDROME: ICD-10-CM

## 2019-10-10 DIAGNOSIS — N39.46 MIXED STRESS AND URGE URINARY INCONTINENCE: ICD-10-CM

## 2019-10-10 LAB — HBA1C MFR BLD: 7 %

## 2019-10-10 PROCEDURE — 4040F PNEUMOC VAC/ADMIN/RCVD: CPT | Performed by: INTERNAL MEDICINE

## 2019-10-10 PROCEDURE — 1123F ACP DISCUSS/DSCN MKR DOCD: CPT | Performed by: INTERNAL MEDICINE

## 2019-10-10 PROCEDURE — 0509F URINE INCON PLAN DOCD: CPT | Performed by: INTERNAL MEDICINE

## 2019-10-10 PROCEDURE — G8399 PT W/DXA RESULTS DOCUMENT: HCPCS | Performed by: INTERNAL MEDICINE

## 2019-10-10 PROCEDURE — 1036F TOBACCO NON-USER: CPT | Performed by: INTERNAL MEDICINE

## 2019-10-10 PROCEDURE — G8427 DOCREV CUR MEDS BY ELIG CLIN: HCPCS | Performed by: INTERNAL MEDICINE

## 2019-10-10 PROCEDURE — 2022F DILAT RTA XM EVC RTNOPTHY: CPT | Performed by: INTERNAL MEDICINE

## 2019-10-10 PROCEDURE — 1090F PRES/ABSN URINE INCON ASSESS: CPT | Performed by: INTERNAL MEDICINE

## 2019-10-10 PROCEDURE — G8417 CALC BMI ABV UP PARAM F/U: HCPCS | Performed by: INTERNAL MEDICINE

## 2019-10-10 PROCEDURE — 3017F COLORECTAL CA SCREEN DOC REV: CPT | Performed by: INTERNAL MEDICINE

## 2019-10-10 PROCEDURE — 83036 HEMOGLOBIN GLYCOSYLATED A1C: CPT | Performed by: INTERNAL MEDICINE

## 2019-10-10 PROCEDURE — 99214 OFFICE O/P EST MOD 30 MIN: CPT | Performed by: INTERNAL MEDICINE

## 2019-10-10 PROCEDURE — G8484 FLU IMMUNIZE NO ADMIN: HCPCS | Performed by: INTERNAL MEDICINE

## 2019-10-10 PROCEDURE — G8598 ASA/ANTIPLAT THER USED: HCPCS | Performed by: INTERNAL MEDICINE

## 2019-10-10 RX ORDER — APIXABAN 5 MG/1
5 TABLET, FILM COATED ORAL 2 TIMES DAILY
Refills: 3 | COMMUNITY
Start: 2019-09-20

## 2019-10-10 RX ORDER — NYSTATIN 100000 U/G
CREAM TOPICAL
Qty: 30 G | Refills: 5 | Status: SHIPPED | OUTPATIENT
Start: 2019-10-10 | End: 2020-02-20

## 2019-10-10 RX ORDER — NYSTATIN 100000 [USP'U]/G
POWDER TOPICAL
Qty: 60 G | Refills: 5 | Status: SHIPPED | OUTPATIENT
Start: 2019-10-10

## 2019-10-10 RX ORDER — OXYBUTYNIN CHLORIDE 10 MG/1
10 TABLET, EXTENDED RELEASE ORAL DAILY
Qty: 30 TABLET | Refills: 3 | Status: SHIPPED | OUTPATIENT
Start: 2019-10-10 | End: 2020-08-14

## 2019-10-10 RX ORDER — GLUCOSAMINE HCL/CHONDROITIN SU 500-400 MG
1 CAPSULE ORAL 2 TIMES DAILY PRN
Qty: 100 STRIP | Refills: 11 | Status: SHIPPED | OUTPATIENT
Start: 2019-10-10 | End: 2020-05-21 | Stop reason: SDUPTHER

## 2019-10-10 RX ORDER — CHLORHEXIDINE GLUCONATE 4 G/100ML
SOLUTION TOPICAL
Qty: 118 ML | Refills: 5 | Status: SHIPPED | OUTPATIENT
Start: 2019-10-10 | End: 2019-10-24

## 2019-10-10 ASSESSMENT — ENCOUNTER SYMPTOMS
GASTROINTESTINAL NEGATIVE: 1
ALLERGIC/IMMUNOLOGIC NEGATIVE: 1
RESPIRATORY NEGATIVE: 1
EYES NEGATIVE: 1

## 2019-11-12 ENCOUNTER — CARE COORDINATION (OUTPATIENT)
Dept: CARE COORDINATION | Age: 69
End: 2019-11-12

## 2019-12-13 RX ORDER — ATORVASTATIN CALCIUM 80 MG/1
TABLET, FILM COATED ORAL
Qty: 30 TABLET | Refills: 0 | Status: SHIPPED | OUTPATIENT
Start: 2019-12-13

## 2019-12-30 ENCOUNTER — CARE COORDINATION (OUTPATIENT)
Dept: CARE COORDINATION | Age: 69
End: 2019-12-30

## 2020-02-20 ENCOUNTER — CARE COORDINATION (OUTPATIENT)
Dept: CARE COORDINATION | Age: 70
End: 2020-02-20

## 2020-02-20 ENCOUNTER — OFFICE VISIT (OUTPATIENT)
Dept: INTERNAL MEDICINE | Age: 70
End: 2020-02-20
Payer: MEDICARE

## 2020-02-20 ENCOUNTER — HOSPITAL ENCOUNTER (OUTPATIENT)
Age: 70
Setting detail: SPECIMEN
Discharge: HOME | End: 2020-02-20
Payer: MEDICARE

## 2020-02-20 VITALS
SYSTOLIC BLOOD PRESSURE: 137 MMHG | WEIGHT: 293 LBS | HEART RATE: 71 BPM | BODY MASS INDEX: 48.82 KG/M2 | HEIGHT: 65 IN | DIASTOLIC BLOOD PRESSURE: 82 MMHG

## 2020-02-20 LAB
CHOLESTEROL/HDL RATIO: 2.3
CHOLESTEROL: 130 MG/DL
HDLC SERPL-MCNC: 56 MG/DL
LDL CHOLESTEROL: 41 MG/DL (ref 0–130)
TRIGL SERPL-MCNC: 163 MG/DL
TSH SERPL DL<=0.05 MIU/L-ACNC: 0.76 MIU/L (ref 0.3–5)
VLDLC SERPL CALC-MCNC: ABNORMAL MG/DL (ref 1–30)

## 2020-02-20 PROCEDURE — 99211 OFF/OP EST MAY X REQ PHY/QHP: CPT | Performed by: INTERNAL MEDICINE

## 2020-02-20 PROCEDURE — 1036F TOBACCO NON-USER: CPT | Performed by: INTERNAL MEDICINE

## 2020-02-20 PROCEDURE — 3017F COLORECTAL CA SCREEN DOC REV: CPT | Performed by: INTERNAL MEDICINE

## 2020-02-20 PROCEDURE — 80061 LIPID PANEL: CPT

## 2020-02-20 PROCEDURE — G8427 DOCREV CUR MEDS BY ELIG CLIN: HCPCS | Performed by: INTERNAL MEDICINE

## 2020-02-20 PROCEDURE — 99214 OFFICE O/P EST MOD 30 MIN: CPT | Performed by: INTERNAL MEDICINE

## 2020-02-20 PROCEDURE — 4040F PNEUMOC VAC/ADMIN/RCVD: CPT | Performed by: INTERNAL MEDICINE

## 2020-02-20 PROCEDURE — G8399 PT W/DXA RESULTS DOCUMENT: HCPCS | Performed by: INTERNAL MEDICINE

## 2020-02-20 PROCEDURE — G8484 FLU IMMUNIZE NO ADMIN: HCPCS | Performed by: INTERNAL MEDICINE

## 2020-02-20 PROCEDURE — 1090F PRES/ABSN URINE INCON ASSESS: CPT | Performed by: INTERNAL MEDICINE

## 2020-02-20 PROCEDURE — 3046F HEMOGLOBIN A1C LEVEL >9.0%: CPT | Performed by: INTERNAL MEDICINE

## 2020-02-20 PROCEDURE — 36415 COLL VENOUS BLD VENIPUNCTURE: CPT

## 2020-02-20 PROCEDURE — 2022F DILAT RTA XM EVC RTNOPTHY: CPT | Performed by: INTERNAL MEDICINE

## 2020-02-20 PROCEDURE — 1123F ACP DISCUSS/DSCN MKR DOCD: CPT | Performed by: INTERNAL MEDICINE

## 2020-02-20 PROCEDURE — 84443 ASSAY THYROID STIM HORMONE: CPT

## 2020-02-20 PROCEDURE — G8417 CALC BMI ABV UP PARAM F/U: HCPCS | Performed by: INTERNAL MEDICINE

## 2020-02-20 RX ORDER — AMITRIPTYLINE HYDROCHLORIDE 75 MG/1
TABLET, FILM COATED ORAL
Qty: 30 TABLET | Refills: 3 | Status: SHIPPED | OUTPATIENT
Start: 2020-02-20 | End: 2020-06-18

## 2020-02-20 ASSESSMENT — PATIENT HEALTH QUESTIONNAIRE - PHQ9
SUM OF ALL RESPONSES TO PHQ9 QUESTIONS 1 & 2: 0
SUM OF ALL RESPONSES TO PHQ QUESTIONS 1-9: 0
1. LITTLE INTEREST OR PLEASURE IN DOING THINGS: 0
SUM OF ALL RESPONSES TO PHQ QUESTIONS 1-9: 0
2. FEELING DOWN, DEPRESSED OR HOPELESS: 0

## 2020-02-20 ASSESSMENT — ENCOUNTER SYMPTOMS
RESPIRATORY NEGATIVE: 1
ALLERGIC/IMMUNOLOGIC NEGATIVE: 1
GASTROINTESTINAL NEGATIVE: 1

## 2020-02-20 NOTE — PROGRESS NOTES
[address] [facility]     Progress Note      Visit Information    Have you changed or started any medications since your last visit including any over-the-counter medicines, vitamins, or herbal medicines? yes - Vitamin E   Are you having any side effects from any of your medications? -  no  Have you stopped taking any of your medications? Is so, why? -  no    Have you seen any other physician or provider since your last visit? Yes - Records Obtained  Have you had any other diagnostic tests since your last visit? No  Have you been seen in the emergency room and/or had an admission to a hospital since we last saw you? No  Have you had your routine dental cleaning in the past 6 months? no    Have you activated your [employer] account? If not, what are your barriers?  Yes     Patient Care Team:  Zoya Gallegos MD as PCP - General (Internal Medicine)  Zoya Galleogs MD as PCP - [facility]  KAMILLE Smith CNP as Nurse Practitioner (Cardiology)  Liberty Knapp MD as Surgeon (Orthopedic Surgery)  Vineet Quintero DPM (Podiatry)  Agnes Scott (Cardiology)  Achille Goldmann, RN as Ambulatory Care Manager    Medical History Review  Past Medical, Family, and Social History reviewed and does contribute to the patient presenting condition    Health Maintenance   Topic Date Due    Hepatitis B vaccine (1 of 3 - Risk 3-dose series) 09/12/1968    Shingles Vaccine (1 of 2) 09/12/1999    Diabetic retinal exam  01/15/2017    Diabetic foot exam  04/18/2019    Flu vaccine (1) 09/01/2019    Annual Wellness Visit (AWV)  10/10/2019    Lipid screen  02/09/2020    Diabetic microalbuminuria test  03/20/2020    Colon Cancer Screen FIT/FOBT  03/28/2020    Breast cancer screen  04/17/2020    A1C test (Diabetic or Prediabetic)  10/10/2020    Potassium monitoring  11/14/2020    Creatinine monitoring  11/14/2020    DTaP/Tdap/Td vaccine (2 - Td) 09/03/2025    DEXA (modify frequency per FRAX score)  Patient in clinical research study 03/06/2017    AbsorbIV    Sleep apnea     CPAP    Snores     Type II or unspecified type diabetes mellitus without mention of complication, not stated as uncontrolled     Use of cane as ambulatory aid     Wears contact lenses     LEFT EYE ONLY     Past Surgical History:   Procedure Laterality Date    CONTROL OF NOSE BLEED LIGATION & NASAL ENDOSCOPY Right 04/08/2019    turbinoplasty    CORONARY ANGIOPLASTY WITH STENT PLACEMENT  2015    2 stents/ Dr. Angie Curry, [facility] ARTHROSCOPY Left     NASAL SINUS SURGERY Right 4/8/2019    NASAL ENDOSCOPIC, NASAL CAUTERY performed by Gisselle Cifuentes MD at [address] Right 07/21/2016    TOTAL KNEE ARTHROPLASTY Left 03/2016    TURBINOPLASTIES Right 4/8/2019    TURBINOPLASTY performed by Gisselle Cifuentes MD at [address] History     Tobacco Use    Smoking status: Never Smoker    Smokeless tobacco: Never Used   Substance Use Topics    Alcohol use: Never     Frequency: Never     Marybeth Das  reports that she has never smoked. She has never used smokeless tobacco.    FAMILY HISTORY:      Family History   Problem Relation Age of Onset    Stroke Mother     Heart Disease Mother     Heart Disease Father     Heart Surgery Father 39    Migraines Sister     Heart Disease Brother     Heart Attack Brother 79    Stroke Brother     Other Son         Hit by drunken        REVIEW OF SYSTEMS:    Review of Systems   Constitutional: Positive for unexpected weight change. HENT: Negative. Eyes: Positive for visual disturbance. Respiratory: Negative. Cardiovascular: Negative. Gastrointestinal: Negative. Endocrine: Negative. Genitourinary: Positive for urgency. Musculoskeletal: Negative. Skin: Negative. Allergic/Immunologic: Negative. Neurological: Positive for headaches. Hematological: Negative.     Psychiatric/Behavioral: Negative.         PHYSICAL EXAM:      Vitals:    02/20/20 1415   BP: 137/82   Pulse: 71     BP Readings from Last 3 Encounters:   02/20/20 137/82   10/10/19 134/75   04/08/19 105/67       Physical Examination: General appearance - alert, well appearing, and in no distress and overweight  Mental status - alert, oriented to person, place, and time  Mouth - mucous membranes moist, pharynx normal without lesions  Chest - clear to auscultation, no wheezes, rales or rhonchi, symmetric air entry  Heart - normal rate and regular rhythm  Abdomen - bowel sounds normal  Neurological - alert, oriented, normal speech, no focal findings or movement disorder noted  Musculoskeletal - no joint tenderness, deformity or swelling    LABORATORY FINDINGS:    CBC:   Lab Results   Component Value Date    WBC 7.24 11/14/2019    WBC 7.6 03/20/2019    HGB 11.6 11/14/2019     11/14/2019     BMP:    Lab Results   Component Value Date     11/14/2019    K 4.5 11/14/2019     11/14/2019    CO2 28 11/14/2019    BUN 19 11/14/2019    CREATININE 0.83 11/14/2019    GLUCOSE 178 11/14/2019     Hemoglobin A1C:   Lab Results   Component Value Date    LABA1C 7.0 10/10/2019     Microalbumin Urine:   Lab Results   Component Value Date    MICROALBUR <12 03/20/2019     Lipid profile:   Lab Results   Component Value Date    CHOL 129 02/09/2019    TRIG 95 02/09/2019    HDL 65 02/09/2019     Thyroid functions:   Lab Results   Component Value Date    TSH 0.94 05/28/2018      Hepatic functions:   Lab Results   Component Value Date    ALT 15 11/14/2019    AST 23 11/14/2019    PROT 7.1 11/14/2019    BILITOT 0.3 11/14/2019    BILIDIR 0.10 09/10/2012    LABALBU 3.7 11/14/2019     Urine Analysis: No results found for: [address] [address]:      Health Maintenance Due   Topic Date Due    Hepatitis B vaccine (1 of 3 - Risk 3-dose series) 09/12/1968    Shingles Vaccine (1 of 2) 09/12/1999    Diabetic retinal exam  01/15/2017    Diabetic foot Completed Refills               Requested Prescriptions     Signed Prescriptions Disp Refills    amitriptyline (ELAVIL) 75 MG tablet 30 tablet 3     Sig: TAKE 1 TABLET EVERY NIGHT       5. Patient given educational materials - see patient instructions    6. Was a self-tracking handout given in paper form or via [employer]? Yes  If yes, see orders or list here. Orders Placed This Encounter   Procedures    TSH with Reflex     Please get this labwork done before your next visit. Standing Status:   Future     Standing Expiration Date:   2/19/2021    Lipid Panel     Standing Status:   Future     Standing Expiration Date:   3/20/2020     Order Specific Question:   Is Patient Fasting?/# of Hours     Answer:   15    DME Order for Cain Pond as OP     You must complete the order parameters below and add the medical necessity documentation for this DME in a separate note. Folding Walker with Wheels and Seat    Current patient weight: Weight: 296 lb 9.6 oz (134.5 kg)  Current patient height: Height: 5' 5\" (165.1 cm)  Diagnosis: morbid obesity  Duration: Purchase       Return in about 3 months (around 5/20/2020). Patient voiced understanding and agreed to treatment plan. This note is created with the assistance of a speech-recognition program. While intending to generate a document that actually reflects the content of the visit, the document can still have some mistakes which may not have been identified and corrected by editing.     Dr Violeta Carter MD, [facility]  Associate , Department of Internal Medicine  [facility]  Attending [address] [facility], [place] [address]                   2/20/2020, 3:03 PM

## 2020-02-20 NOTE — PROGRESS NOTES
Patient has been enrolled in [facility], session 2. Welcome packet given to patient. Writer will follow up with patient one week before they begin their session.

## 2020-02-20 NOTE — PATIENT INSTRUCTIONS
Medications e-scribe to pharmacy of pt's choice. Printed script for walker given to pt with signed office notes. Scripts for lab given to pt with fasting instructions, pt will get labs done as soon as possible. Patient to return to clinic 3 months (5/21/20). AVS reviewed and given to pt. It is very important for your care that you keep your appointment. If for some reason you are unable to keep your appointment it is equally important that you call our office at [phone] to cancel your appointment and reschedule. Failure to do so may result in your termination from our practice.   MB

## 2020-02-21 ENCOUNTER — TELEPHONE (OUTPATIENT)
Dept: INTERNAL MEDICINE | Age: 70
End: 2020-02-21

## 2020-04-02 ENCOUNTER — TELEMEDICINE (OUTPATIENT)
Dept: NEUROLOGY | Age: 70
End: 2020-04-02
Payer: MEDICARE

## 2020-04-02 PROCEDURE — G8417 CALC BMI ABV UP PARAM F/U: HCPCS | Performed by: PSYCHIATRY & NEUROLOGY

## 2020-04-02 PROCEDURE — 3017F COLORECTAL CA SCREEN DOC REV: CPT | Performed by: PSYCHIATRY & NEUROLOGY

## 2020-04-02 PROCEDURE — 1090F PRES/ABSN URINE INCON ASSESS: CPT | Performed by: PSYCHIATRY & NEUROLOGY

## 2020-04-02 PROCEDURE — G8427 DOCREV CUR MEDS BY ELIG CLIN: HCPCS | Performed by: PSYCHIATRY & NEUROLOGY

## 2020-04-02 PROCEDURE — 99214 OFFICE O/P EST MOD 30 MIN: CPT | Performed by: PSYCHIATRY & NEUROLOGY

## 2020-04-02 PROCEDURE — 1123F ACP DISCUSS/DSCN MKR DOCD: CPT | Performed by: PSYCHIATRY & NEUROLOGY

## 2020-04-02 PROCEDURE — G8399 PT W/DXA RESULTS DOCUMENT: HCPCS | Performed by: PSYCHIATRY & NEUROLOGY

## 2020-04-02 PROCEDURE — 1036F TOBACCO NON-USER: CPT | Performed by: PSYCHIATRY & NEUROLOGY

## 2020-04-02 PROCEDURE — 4040F PNEUMOC VAC/ADMIN/RCVD: CPT | Performed by: PSYCHIATRY & NEUROLOGY

## 2020-04-02 ASSESSMENT — ENCOUNTER SYMPTOMS
GASTROINTESTINAL NEGATIVE: 1
ALLERGIC/IMMUNOLOGIC NEGATIVE: 1
EYES NEGATIVE: 1
RESPIRATORY NEGATIVE: 1

## 2020-04-02 NOTE — PROGRESS NOTES
Patience/ Dr. Garrick Morejon Neuromuscular disorder [facility])     Osteoarthritis     Patient in clinical research study 03/06/2017    AbsorbIV    Sleep apnea     CPAP    Snores     Type II or unspecified type diabetes mellitus without mention of complication, not stated as uncontrolled     Use of cane as ambulatory aid     Wears contact lenses     LEFT EYE ONLY       Past Surgical History:   Procedure Laterality Date    CONTROL OF NOSE BLEED LIGATION & NASAL ENDOSCOPY Right 04/08/2019    turbinoplasty    CORONARY ANGIOPLASTY WITH STENT PLACEMENT  2015    2 stents/ Dr. Addi Lay, [address] [place] [address] ARTHROSCOPY Left     NASAL SINUS SURGERY Right 4/8/2019    NASAL ENDOSCOPIC, NASAL CAUTERY performed by Brain Donaldson MD at [facility] Right 07/21/2016    TOTAL KNEE ARTHROPLASTY Left 03/2016    TURBINOPLASTIES Right 4/8/2019    TURBINOPLASTY performed by Brain Donaldson MD at [address] History   Problem Relation Age of Onset    Stroke Mother     Heart Disease Mother     Heart Disease Father     Heart Surgery Father 39    Migraines Sister     Heart Disease Brother     Heart Attack Brother 79    Stroke Brother     Other Son         Hit by drunken        Social History     Socioeconomic History    Marital status:      Spouse name: None    Number of children: 2    Years of education: None    Highest education level: None   Occupational History    Occupation: retired   Social Needs    Financial resource strain: None    Food insecurity     Worry: None     Inability: None    Transportation needs     Medical: None     Non-medical: None   Tobacco Use    Smoking status: Never Smoker    Smokeless tobacco: Never Used   Substance and Sexual Activity    Alcohol use: Never     Frequency: Never    Drug use: Never    Sexual activity: None   Lifestyle    Physical activity     Days per week: None     Minutes per session: None    Stress: None minutes as needed for Chest pain up to max of 3 total doses. If no relief after 1 dose, call 911. 25 tablet 3    magnesium hydroxide (MILK OF MAGNESIA) 400 MG/5ML suspension Take 5 mLs by mouth daily as needed for Constipation      nystatin (MYCOSTATIN) 090000 UNIT/GM powder Apply 3 times daily. (Patient not taking: Reported on 4/2/2020) 60 g 5     No current facility-administered medications for this visit. Allergies   Allergen Reactions    Lisinopril Itching     Inflammation, burning, itching    Pcn [Penicillins] Hives    Fish-Derived Products     Losartan Hives       Review of Systems   Constitutional: Negative. HENT: Negative. Eyes: Negative. Respiratory: Negative. Cardiovascular: Negative. Gastrointestinal: Negative. Endocrine: Negative. Genitourinary: Negative. Musculoskeletal: Negative. Skin: Negative. Allergic/Immunologic: Negative. Neurological: Negative. Hematological: Negative. Psychiatric/Behavioral: Negative. Objective:   Physical Exam  There were no vitals filed for this visit. weight:      Neurological Examination  Constitutional .General exam well groomed   Ears /Nose/Throat: external ear . Normal exam  Neck and thyroid . Normal size  Cardiovascular: Auscultation of heart with regular rate and rhythm   Musculoskeletal. Muscle bulk and tone normal                                                           Muscle strength 5/5 strength throughout                                                                                 No dysmetria or dysdiadokinesis  No tremor   Normal fine motor  Orientation Alert and oriented x 3   Attention and concentration normal   Short term memory normal   Language process and speech normal . No aphasia   Cranial nerve 2 normal acuety and visual fields  Cranial nerve 3, 4 and 6 . Extraocular muscles are intact . Pupils are equal and reactive   Cranial nerve 5 . Intact corneal reflex.  Normal facial sensation  Cranial electronic signature was used to authenticate this note.

## 2020-04-03 ENCOUNTER — TELEPHONE (OUTPATIENT)
Dept: NEUROLOGY | Age: 70
End: 2020-04-03

## 2020-05-21 ENCOUNTER — VIRTUAL VISIT (OUTPATIENT)
Dept: INTERNAL MEDICINE | Age: 70
End: 2020-05-21
Payer: MEDICARE

## 2020-05-21 VITALS — BODY MASS INDEX: 49.36 KG/M2 | HEIGHT: 65 IN

## 2020-05-21 PROCEDURE — 99213 OFFICE O/P EST LOW 20 MIN: CPT | Performed by: INTERNAL MEDICINE

## 2020-05-21 RX ORDER — GLUCOSAMINE HCL/CHONDROITIN SU 500-400 MG
CAPSULE ORAL
Qty: 100 STRIP | Refills: 11 | Status: SHIPPED | OUTPATIENT
Start: 2020-05-21

## 2020-06-09 RX ORDER — AMLODIPINE BESYLATE 10 MG/1
10 TABLET ORAL DAILY
Qty: 30 TABLET | Refills: 5 | Status: SHIPPED | OUTPATIENT
Start: 2020-06-09

## 2020-06-18 RX ORDER — PANTOPRAZOLE SODIUM 40 MG/1
TABLET, DELAYED RELEASE ORAL
Qty: 90 TABLET | Refills: 3 | Status: SHIPPED | OUTPATIENT
Start: 2020-06-18

## 2020-06-18 RX ORDER — AMITRIPTYLINE HYDROCHLORIDE 50 MG/1
TABLET, FILM COATED ORAL
Qty: 90 TABLET | Refills: 3 | Status: SHIPPED | OUTPATIENT
Start: 2020-06-18 | End: 2021-05-19 | Stop reason: SDUPTHER

## 2020-06-18 RX ORDER — FUROSEMIDE 20 MG/1
20 TABLET ORAL DAILY
Qty: 60 TABLET | Refills: 3 | Status: SHIPPED | OUTPATIENT
Start: 2020-06-18

## 2020-07-14 NOTE — TELEPHONE ENCOUNTER
Refill request for Metformin. If appropriate please send medication(s) to patients pharmacy. Next appt: Patient is currently on wait list for provider. Last appt: 5/21/2020    Health Maintenance   Topic Date Due    Shingles Vaccine (1 of 2) 09/12/1999    Diabetic retinal exam  01/15/2017    Diabetic foot exam  04/18/2019    Annual Wellness Visit (AWV)  10/10/2019    Diabetic microalbuminuria test  03/20/2020    Colon Cancer Screen FIT/FOBT  03/28/2020    Breast cancer screen  04/17/2020    Flu vaccine (1) 09/01/2020    A1C test (Diabetic or Prediabetic)  10/10/2020    Potassium monitoring  11/14/2020    Creatinine monitoring  11/14/2020    Lipid screen  02/20/2021    DTaP/Tdap/Td vaccine (2 - Td) 09/03/2025    DEXA (modify frequency per FRAX score)  Completed    Pneumococcal 65+ years Vaccine  Completed    Hepatitis C screen  Completed    Hepatitis A vaccine  Aged Out    Hib vaccine  Aged Out    Meningococcal (ACWY) vaccine  Aged Out       Hemoglobin A1C (%)   Date Value   10/10/2019 7.0   06/07/2018 6.8   09/26/2017 6.6             ( goal A1C is < 7)   Microalb/Crt.  Ratio (mcg/mg creat)   Date Value   03/20/2019 CANNOT BE CALCULATED     LDL Cholesterol (mg/dL)   Date Value   02/20/2020 41       (goal LDL is <100)   AST (IU/L)   Date Value   11/14/2019 23     ALT (IU/L)   Date Value   11/14/2019 15     BUN (mg/dL)   Date Value   11/14/2019 19     BP Readings from Last 3 Encounters:   02/20/20 137/82   10/10/19 134/75   04/08/19 105/67          (goal 120/80)          Patient Active Problem List:     Essential hypertension     Obstructive sleep apnea syndrome     Morbid obesity due to excess calories (HCC)     Arthritis of knee     Cardiac murmur     Type 2 diabetes mellitus with diabetic polyneuropathy, without long-term current use of insulin (HCC)     Status post right knee replacement     Coronary artery disease involving native coronary artery of native heart with unstable angina pectoris ([facility] [address]     Meningioma (HCC)     Chronic diastolic congestive heart failure (HCC)     Chest pain     First degree AV block     Chronic daily headache     Eczema     Unstable angina (HCC)     Chronic atrial fibrillation     Epistaxis, recurrent

## 2020-07-29 ENCOUNTER — APPOINTMENT (OUTPATIENT)
Dept: CT IMAGING | Age: 70
DRG: 149 | End: 2020-07-29
Payer: MEDICARE

## 2020-07-29 ENCOUNTER — HOSPITAL ENCOUNTER (INPATIENT)
Age: 70
LOS: 6 days | Discharge: NURSING, REHAB OR LONG-TERM CARE | DRG: 149 | End: 2020-08-04
Attending: EMERGENCY MEDICINE | Admitting: INTERNAL MEDICINE
Payer: MEDICARE

## 2020-07-29 ENCOUNTER — VIRTUAL VISIT (OUTPATIENT)
Dept: INTERNAL MEDICINE | Age: 70
End: 2020-07-29
Payer: MEDICARE

## 2020-07-29 ENCOUNTER — APPOINTMENT (OUTPATIENT)
Dept: GENERAL RADIOLOGY | Age: 70
DRG: 149 | End: 2020-07-29
Payer: MEDICARE

## 2020-07-29 PROBLEM — R04.0 EPISTAXIS, RECURRENT: Status: RESOLVED | Noted: 2019-02-08 | Resolved: 2020-07-29

## 2020-07-29 PROBLEM — R42 DIZZINESS: Status: ACTIVE | Noted: 2020-07-29

## 2020-07-29 PROBLEM — R42 VERTIGO: Status: ACTIVE | Noted: 2020-07-29

## 2020-07-29 LAB
ABSOLUTE EOS #: 0.11 K/UL (ref 0–0.44)
ABSOLUTE IMMATURE GRANULOCYTE: <0.03 K/UL (ref 0–0.3)
ABSOLUTE LYMPH #: 2.81 K/UL (ref 1.1–3.7)
ABSOLUTE MONO #: 0.83 K/UL (ref 0.1–1.2)
ANION GAP SERPL CALCULATED.3IONS-SCNC: 15 MMOL/L (ref 9–17)
BASOPHILS # BLD: 0 % (ref 0–2)
BASOPHILS ABSOLUTE: 0.03 K/UL (ref 0–0.2)
BUN BLDV-MCNC: 17 MG/DL (ref 8–23)
BUN/CREAT BLD: ABNORMAL (ref 9–20)
CALCIUM SERPL-MCNC: 9.6 MG/DL (ref 8.6–10.4)
CHLORIDE BLD-SCNC: 101 MMOL/L (ref 98–107)
CO2: 24 MMOL/L (ref 20–31)
CREAT SERPL-MCNC: 0.85 MG/DL (ref 0.5–0.9)
DIFFERENTIAL TYPE: NORMAL
EOSINOPHILS RELATIVE PERCENT: 1 % (ref 1–4)
GFR AFRICAN AMERICAN: >60 ML/MIN
GFR NON-AFRICAN AMERICAN: >60 ML/MIN
GFR SERPL CREATININE-BSD FRML MDRD: ABNORMAL ML/MIN/{1.73_M2}
GFR SERPL CREATININE-BSD FRML MDRD: ABNORMAL ML/MIN/{1.73_M2}
GLUCOSE BLD-MCNC: 101 MG/DL (ref 70–99)
HCT VFR BLD CALC: 44 % (ref 36.3–47.1)
HEMOGLOBIN: 13.7 G/DL (ref 11.9–15.1)
IMMATURE GRANULOCYTES: 0 %
INR BLD: 1
LYMPHOCYTES # BLD: 34 % (ref 24–43)
MCH RBC QN AUTO: 28.8 PG (ref 25.2–33.5)
MCHC RBC AUTO-ENTMCNC: 31.1 G/DL (ref 28.4–34.8)
MCV RBC AUTO: 92.6 FL (ref 82.6–102.9)
MONOCYTES # BLD: 10 % (ref 3–12)
NRBC AUTOMATED: 0 PER 100 WBC
PARTIAL THROMBOPLASTIN TIME: 31.9 SEC (ref 20.5–30.5)
PDW BLD-RTO: 14.4 % (ref 11.8–14.4)
PLATELET # BLD: 193 K/UL (ref 138–453)
PLATELET ESTIMATE: NORMAL
PMV BLD AUTO: 9.8 FL (ref 8.1–13.5)
POTASSIUM SERPL-SCNC: 4.1 MMOL/L (ref 3.7–5.3)
PROTHROMBIN TIME: 11 SEC (ref 9–12)
RBC # BLD: 4.75 M/UL (ref 3.95–5.11)
RBC # BLD: NORMAL 10*6/UL
SEG NEUTROPHILS: 55 % (ref 36–65)
SEGMENTED NEUTROPHILS ABSOLUTE COUNT: 4.55 K/UL (ref 1.5–8.1)
SODIUM BLD-SCNC: 140 MMOL/L (ref 135–144)
TROPONIN INTERP: NORMAL
TROPONIN T: NORMAL NG/ML
TROPONIN, HIGH SENSITIVITY: 9 NG/L (ref 0–14)
WBC # BLD: 8.4 K/UL (ref 3.5–11.3)
WBC # BLD: NORMAL 10*3/UL

## 2020-07-29 PROCEDURE — 6370000000 HC RX 637 (ALT 250 FOR IP): Performed by: STUDENT IN AN ORGANIZED HEALTH CARE EDUCATION/TRAINING PROGRAM

## 2020-07-29 PROCEDURE — 99442 PR PHYS/QHP TELEPHONE EVALUATION 11-20 MIN: CPT | Performed by: INTERNAL MEDICINE

## 2020-07-29 PROCEDURE — 93005 ELECTROCARDIOGRAM TRACING: CPT | Performed by: STUDENT IN AN ORGANIZED HEALTH CARE EDUCATION/TRAINING PROGRAM

## 2020-07-29 PROCEDURE — 1200000000 HC SEMI PRIVATE

## 2020-07-29 PROCEDURE — 70450 CT HEAD/BRAIN W/O DYE: CPT

## 2020-07-29 PROCEDURE — 71046 X-RAY EXAM CHEST 2 VIEWS: CPT

## 2020-07-29 PROCEDURE — 70496 CT ANGIOGRAPHY HEAD: CPT

## 2020-07-29 PROCEDURE — 6360000004 HC RX CONTRAST MEDICATION: Performed by: STUDENT IN AN ORGANIZED HEALTH CARE EDUCATION/TRAINING PROGRAM

## 2020-07-29 PROCEDURE — 84484 ASSAY OF TROPONIN QUANT: CPT

## 2020-07-29 PROCEDURE — 6360000002 HC RX W HCPCS: Performed by: STUDENT IN AN ORGANIZED HEALTH CARE EDUCATION/TRAINING PROGRAM

## 2020-07-29 PROCEDURE — 83036 HEMOGLOBIN GLYCOSYLATED A1C: CPT

## 2020-07-29 PROCEDURE — 96375 TX/PRO/DX INJ NEW DRUG ADDON: CPT

## 2020-07-29 PROCEDURE — 85025 COMPLETE CBC W/AUTO DIFF WBC: CPT

## 2020-07-29 PROCEDURE — 85730 THROMBOPLASTIN TIME PARTIAL: CPT

## 2020-07-29 PROCEDURE — 80048 BASIC METABOLIC PNL TOTAL CA: CPT

## 2020-07-29 PROCEDURE — 99285 EMERGENCY DEPT VISIT HI MDM: CPT

## 2020-07-29 PROCEDURE — 85610 PROTHROMBIN TIME: CPT

## 2020-07-29 PROCEDURE — 96374 THER/PROPH/DIAG INJ IV PUSH: CPT

## 2020-07-29 RX ORDER — ONDANSETRON 2 MG/ML
4 INJECTION INTRAMUSCULAR; INTRAVENOUS ONCE
Status: COMPLETED | OUTPATIENT
Start: 2020-07-29 | End: 2020-07-29

## 2020-07-29 RX ORDER — ATORVASTATIN CALCIUM 80 MG/1
80 TABLET, FILM COATED ORAL NIGHTLY
Status: DISCONTINUED | OUTPATIENT
Start: 2020-07-29 | End: 2020-08-04 | Stop reason: HOSPADM

## 2020-07-29 RX ORDER — MECLIZINE HCL 12.5 MG/1
25 TABLET ORAL ONCE
Status: COMPLETED | OUTPATIENT
Start: 2020-07-29 | End: 2020-07-29

## 2020-07-29 RX ORDER — FUROSEMIDE 20 MG/1
20 TABLET ORAL DAILY
Status: DISCONTINUED | OUTPATIENT
Start: 2020-07-30 | End: 2020-08-04 | Stop reason: HOSPADM

## 2020-07-29 RX ORDER — NICOTINE POLACRILEX 4 MG
15 LOZENGE BUCCAL PRN
Status: DISCONTINUED | OUTPATIENT
Start: 2020-07-29 | End: 2020-08-04 | Stop reason: HOSPADM

## 2020-07-29 RX ORDER — POLYETHYLENE GLYCOL 3350 17 G/17G
17 POWDER, FOR SOLUTION ORAL DAILY PRN
Status: DISCONTINUED | OUTPATIENT
Start: 2020-07-29 | End: 2020-08-04 | Stop reason: HOSPADM

## 2020-07-29 RX ORDER — NICOTINE 21 MG/24HR
1 PATCH, TRANSDERMAL 24 HOURS TRANSDERMAL DAILY PRN
Status: DISCONTINUED | OUTPATIENT
Start: 2020-07-29 | End: 2020-08-04 | Stop reason: HOSPADM

## 2020-07-29 RX ORDER — ACETAMINOPHEN 500 MG
1000 TABLET ORAL ONCE
Status: COMPLETED | OUTPATIENT
Start: 2020-07-29 | End: 2020-07-29

## 2020-07-29 RX ORDER — SODIUM CHLORIDE 0.9 % (FLUSH) 0.9 %
10 SYRINGE (ML) INJECTION EVERY 12 HOURS SCHEDULED
Status: DISCONTINUED | OUTPATIENT
Start: 2020-07-29 | End: 2020-08-04 | Stop reason: HOSPADM

## 2020-07-29 RX ORDER — POTASSIUM CHLORIDE 20 MEQ/1
40 TABLET, EXTENDED RELEASE ORAL PRN
Status: DISCONTINUED | OUTPATIENT
Start: 2020-07-29 | End: 2020-08-04 | Stop reason: HOSPADM

## 2020-07-29 RX ORDER — ACETAMINOPHEN 325 MG/1
650 TABLET ORAL EVERY 6 HOURS PRN
Status: DISCONTINUED | OUTPATIENT
Start: 2020-07-29 | End: 2020-08-04 | Stop reason: HOSPADM

## 2020-07-29 RX ORDER — ACETAMINOPHEN 650 MG/1
650 SUPPOSITORY RECTAL EVERY 6 HOURS PRN
Status: DISCONTINUED | OUTPATIENT
Start: 2020-07-29 | End: 2020-08-04 | Stop reason: HOSPADM

## 2020-07-29 RX ORDER — PROMETHAZINE HYDROCHLORIDE 25 MG/1
12.5 TABLET ORAL EVERY 6 HOURS PRN
Status: DISCONTINUED | OUTPATIENT
Start: 2020-07-29 | End: 2020-08-04 | Stop reason: HOSPADM

## 2020-07-29 RX ORDER — DEXTROSE MONOHYDRATE 50 MG/ML
100 INJECTION, SOLUTION INTRAVENOUS PRN
Status: DISCONTINUED | OUTPATIENT
Start: 2020-07-29 | End: 2020-08-04 | Stop reason: HOSPADM

## 2020-07-29 RX ORDER — POTASSIUM CHLORIDE 7.45 MG/ML
10 INJECTION INTRAVENOUS PRN
Status: DISCONTINUED | OUTPATIENT
Start: 2020-07-29 | End: 2020-08-04 | Stop reason: HOSPADM

## 2020-07-29 RX ORDER — SODIUM CHLORIDE 0.9 % (FLUSH) 0.9 %
10 SYRINGE (ML) INJECTION PRN
Status: DISCONTINUED | OUTPATIENT
Start: 2020-07-29 | End: 2020-08-04 | Stop reason: HOSPADM

## 2020-07-29 RX ORDER — MECLIZINE HCL 12.5 MG/1
12.5 TABLET ORAL 3 TIMES DAILY PRN
Qty: 30 TABLET | Refills: 0 | Status: SHIPPED | OUTPATIENT
Start: 2020-07-29 | End: 2020-08-08

## 2020-07-29 RX ORDER — DEXTROSE MONOHYDRATE 25 G/50ML
12.5 INJECTION, SOLUTION INTRAVENOUS PRN
Status: DISCONTINUED | OUTPATIENT
Start: 2020-07-29 | End: 2020-08-04 | Stop reason: HOSPADM

## 2020-07-29 RX ORDER — AMLODIPINE BESYLATE 10 MG/1
10 TABLET ORAL DAILY
Status: DISCONTINUED | OUTPATIENT
Start: 2020-07-30 | End: 2020-08-04 | Stop reason: HOSPADM

## 2020-07-29 RX ORDER — AMITRIPTYLINE HYDROCHLORIDE 50 MG/1
50 TABLET, FILM COATED ORAL NIGHTLY
Status: DISCONTINUED | OUTPATIENT
Start: 2020-07-29 | End: 2020-08-04 | Stop reason: HOSPADM

## 2020-07-29 RX ORDER — MAGNESIUM SULFATE 1 G/100ML
1 INJECTION INTRAVENOUS PRN
Status: DISCONTINUED | OUTPATIENT
Start: 2020-07-29 | End: 2020-08-04 | Stop reason: HOSPADM

## 2020-07-29 RX ORDER — OXYBUTYNIN CHLORIDE 10 MG/1
10 TABLET, EXTENDED RELEASE ORAL DAILY
Status: DISCONTINUED | OUTPATIENT
Start: 2020-07-30 | End: 2020-08-04 | Stop reason: HOSPADM

## 2020-07-29 RX ORDER — PANTOPRAZOLE SODIUM 40 MG/1
40 TABLET, DELAYED RELEASE ORAL
Status: DISCONTINUED | OUTPATIENT
Start: 2020-07-30 | End: 2020-08-04 | Stop reason: HOSPADM

## 2020-07-29 RX ORDER — METOCLOPRAMIDE HYDROCHLORIDE 5 MG/ML
10 INJECTION INTRAMUSCULAR; INTRAVENOUS ONCE
Status: COMPLETED | OUTPATIENT
Start: 2020-07-29 | End: 2020-07-29

## 2020-07-29 RX ORDER — DIPHENHYDRAMINE HYDROCHLORIDE 50 MG/ML
12.5 INJECTION INTRAMUSCULAR; INTRAVENOUS ONCE
Status: COMPLETED | OUTPATIENT
Start: 2020-07-29 | End: 2020-07-29

## 2020-07-29 RX ORDER — ONDANSETRON 2 MG/ML
4 INJECTION INTRAMUSCULAR; INTRAVENOUS EVERY 6 HOURS PRN
Status: DISCONTINUED | OUTPATIENT
Start: 2020-07-29 | End: 2020-08-04 | Stop reason: HOSPADM

## 2020-07-29 RX ADMIN — MECLIZINE HYDROCHLORIDE 25 MG: 12.5 TABLET, FILM COATED ORAL at 16:59

## 2020-07-29 RX ADMIN — IOHEXOL 90 ML: 350 INJECTION, SOLUTION INTRAVENOUS at 20:24

## 2020-07-29 RX ADMIN — ACETAMINOPHEN 1000 MG: 500 TABLET ORAL at 21:25

## 2020-07-29 RX ADMIN — ONDANSETRON 4 MG: 2 INJECTION INTRAMUSCULAR; INTRAVENOUS at 16:59

## 2020-07-29 RX ADMIN — METOCLOPRAMIDE 10 MG: 5 INJECTION, SOLUTION INTRAMUSCULAR; INTRAVENOUS at 21:26

## 2020-07-29 RX ADMIN — DIPHENHYDRAMINE HYDROCHLORIDE 12.5 MG: 50 INJECTION, SOLUTION INTRAMUSCULAR; INTRAVENOUS at 21:26

## 2020-07-29 ASSESSMENT — PAIN DESCRIPTION - DESCRIPTORS
DESCRIPTORS: ACHING
DESCRIPTORS: ACHING

## 2020-07-29 ASSESSMENT — PAIN DESCRIPTION - ONSET: ONSET: ON-GOING

## 2020-07-29 ASSESSMENT — PAIN SCALES - GENERAL
PAINLEVEL_OUTOF10: 2
PAINLEVEL_OUTOF10: 6
PAINLEVEL_OUTOF10: 10

## 2020-07-29 ASSESSMENT — PAIN - FUNCTIONAL ASSESSMENT: PAIN_FUNCTIONAL_ASSESSMENT: ACTIVITIES ARE NOT PREVENTED

## 2020-07-29 ASSESSMENT — PAIN DESCRIPTION - PAIN TYPE
TYPE: ACUTE PAIN
TYPE: ACUTE PAIN

## 2020-07-29 ASSESSMENT — PAIN DESCRIPTION - FREQUENCY: FREQUENCY: CONTINUOUS

## 2020-07-29 ASSESSMENT — PAIN DESCRIPTION - LOCATION
LOCATION: HEAD
LOCATION: HEAD

## 2020-07-29 ASSESSMENT — ENCOUNTER SYMPTOMS
ABDOMINAL PAIN: 0
SORE THROAT: 0
COUGH: 0
SHORTNESS OF BREATH: 0
BACK PAIN: 0
VOMITING: 0
NAUSEA: 1

## 2020-07-29 ASSESSMENT — PAIN DESCRIPTION - PROGRESSION: CLINICAL_PROGRESSION: GRADUALLY WORSENING

## 2020-07-29 NOTE — ED NOTES
Pt. Resting on stretcher in room 36 with friend at bedside. Pt. Updated on plan of care. Pt. Questions answered at this time. Call light within reach and bed in lowest position. Will continue to monitor.       Tramaine Salas RN  07/29/20 [id number]

## 2020-07-29 NOTE — ED PROVIDER NOTES
[facility] ED  eMERGENCY dEPARTMENT eNCOUnter   Attending Attestation     Pt Name: Stella De La Rosa  MRN: [id number]  [place] 1950  Date of evaluation: 7/29/20       Stella De La Rosa is a 78 y.o. female who presents with Headache and Dizziness      History: Patient presents with headache and feeling like she is falling off of a building. Pt has no other complaints. States symptoms are severe. Movement makes symptoms worse. Exam: HRRR, lungs CTABL, abdomen soft and non tender. Pt well appearing. Pt is worse with movement. Pt has hx of brain mass. Will get CT. Will discuss with neuro. Will treat symptoms. Consider admission. EKG Interpretation    Interpreted by emergency department physician    Rhythm: normal sinus   Rate: normal  Axis: left  Ectopy: none  Conduction: normal  ST Segments: no acute change  T Waves: no acute change  Q Waves: none    Clinical Impression: non-specific EKG      Lorna Cheatham M.D. I performed a history and physical examination of the patient and discussed management with the resident. I reviewed the residents note and agree with the documented findings and plan of care. Any areas of disagreement are noted on the chart. I was personally present for the key portions of any procedures. I have documented in the chart those procedures where I was not present during the key portions. I have personally reviewed all images and agree with the resident's interpretation. I have reviewed the emergency nurses triage note. I agree with the chief complaint, past medical history, past surgical history, allergies, medications, social and family history as documented unless otherwise noted below. Documentation of the HPI, Physical Exam and Medical Decision Making performed by medical students or scribes is based on my personal performance of the HPI, PE and MDM.  For Phys Assistant/ Nurse Practitioner cases/documentation I have had a face to face evaluation of this patient and have completed at least one if not all key elements of the E/M (history, physical exam, and MDM). Additional findings are as noted. For APC cases I have personally evaluated and examined the patient in conjunction with the APC and agree with the treatment plan and disposition of the patient as recorded by the APC.     Braydon Lau MD  Attending Emergency  Physician       Kem Polanco MD  07/29/20 [phone]

## 2020-07-29 NOTE — ED PROVIDER NOTES
 Heart Disease Brother     Heart Attack Brother 79    Stroke Brother     Other Son         Hit by drunken        Allergies:  Lisinopril; Pcn [penicillins]; Fish-derived products; and Losartan    Home Medications:  Prior to Admission medications    Medication Sig Start Date End Date Taking? Authorizing Provider   meclizine (ANTIVERT) 12.5 MG tablet Take 1 tablet by mouth 3 times daily as needed for Dizziness 7/29/20 8/8/20  Celment Denney MD   metFORMIN (GLUCOPHAGE) 1000 MG tablet TAKE 1 TABLET BY MOUTH TWICE DAILY WITH MEALS 7/14/20   Pinky Soler MD   metoprolol tartrate (LOPRESSOR) 25 MG tablet TAKE 1 TABLET BY MOUTH TWICE DAILY 6/18/20   Pinky Soler MD   amitriptyline (ELAVIL) 50 MG tablet TAKE 1 TABLET BY MOUTH EVERY NIGHT 6/18/20   Pinky Soler MD   furosemide (LASIX) 20 MG tablet TAKE 1 TABLET BY MOUTH DAILY 6/18/20   Pinky Soler MD   pantoprazole (PROTONIX) 40 MG tablet TAKE 1 TABLET EVERY MORNING (BEFORE BREAKFAST) 6/18/20   Pinky Soler MD   amLODIPine (NORVASC) 10 MG tablet TAKE 1 TABLET BY MOUTH DAILY 6/9/20   Pinky Soler MD   blood glucose monitor strips Test 3 times a day. Diag: DM II 5/21/20   Pinky Soler MD   atorvastatin (LIPITOR) 80 MG tablet TAKE 1 TABLET BY MOUTH EVERY NIGHT 12/13/19   Zahira Chavarria MD   ELIQUIS 5 MG TABS tablet Take 5 mg by mouth 2 times daily 9/20/19   Historical Provider, MD   oxybutynin (DITROPAN-XL) 10 MG extended release tablet Take 1 tablet by mouth daily 10/10/19   Pinky Soler MD   nystatin (MYCOSTATIN) 769791 UNIT/GM powder Apply 3 times daily. 10/10/19   Pinky Soler MD   Lancets MISC 1 each by Does not apply route daily Use 3 times daily Diagnisis:250.0  Diabetes Mellitus Non-Insulin Dependent 5/30/19   Pinky Soler MD   nitroGLYCERIN (NITROSTAT) 0.4 MG SL tablet Place 1 tablet under the tongue every 5 minutes as needed for Chest pain up to max of 3 total doses.  If no relief after 1 dose, call 911. 5/30/18   Mary Silva MD   magnesium hydroxide (MILK OF MAGNESIA) 400 MG/5ML suspension Take 5 mLs by mouth daily as needed for Constipation    Historical Provider, MD       REVIEW OF SYSTEMS    (2-9 systems for level 4, 10 or more for level 5)      Review of Systems   Constitutional: Negative for chills and fever. HENT: Negative for congestion, ear discharge, ear pain and sore throat. Respiratory: Negative for cough and shortness of breath. Cardiovascular: Negative for chest pain. Gastrointestinal: Positive for nausea. Negative for abdominal pain and vomiting. Genitourinary: Negative for dysuria and frequency. Musculoskeletal: Negative for back pain and neck stiffness. Skin: Negative for rash. Neurological: Positive for dizziness. Negative for weakness, light-headedness, numbness and headaches. PHYSICAL EXAM   (up to 7 for level 4, 8 or more for level 5)      INITIAL VITALS:   /79   Pulse 81   Temp 98.2 °F (36.8 °C) (Oral)   Resp 20   Ht 5' 9\" (1.753 m)   Wt 297 lb (134.7 kg)   SpO2 100%   BMI 43.86 kg/m²      Vitals:    07/29/20 1632 07/29/20 1640   BP:  103/79   Pulse:  81   Resp:  20   Temp: 98.2 °F (36.8 °C) 98.2 °F (36.8 °C)   TempSrc: Oral Oral   SpO2:  100%   Weight:  297 lb (134.7 kg)   Height:  5' 9\" (1.753 m)        Physical Exam  Vitals signs reviewed. Constitutional:       General: She is not in acute distress. Appearance: She is well-developed. HENT:      Head: Normocephalic and atraumatic. Eyes:      Extraocular Movements: Extraocular movements intact. Pupils: Pupils are equal, round, and reactive to light. Neck:      Musculoskeletal: Normal range of motion and neck supple. Cardiovascular:      Rate and Rhythm: Normal rate and regular rhythm. Pulmonary:      Effort: Pulmonary effort is normal.      Breath sounds: Normal breath sounds. Abdominal:      General: Bowel sounds are normal. There is no distension. Palpations: Abdomen is soft. Tenderness:  There is no abdominal tenderness. Musculoskeletal: Normal range of motion. Skin:     General: Skin is warm and dry. Neurological:      Mental Status: She is alert and oriented to person, place, and time. Sensory: No sensory deficit. Motor: No weakness. Comments: Positive hints exam, test vertical skew abnormal.  No nystagmus noted.        DIFFERENTIAL  DIAGNOSIS     PLAN (LABS / IMAGING / EKG):  Orders Placed This Encounter   Procedures    XR CHEST (2 VW)    CT HEAD WO CONTRAST    CTA HEAD NECK W CONTRAST    Basic Metabolic Panel w/ Reflex to MG    Troponin    Protime-INR    APTT    CBC Auto Differential    Inpatient consult to Neurology    Inpatient consult to Hospitalist    EKG 12 Lead    PATIENT STATUS (FROM ED OR OR/PROCEDURAL) Inpatient    PATIENT STATUS (FROM ED OR OR/PROCEDURAL) Inpatient       MEDICATIONS ORDERED:  Orders Placed This Encounter   Medications    meclizine (ANTIVERT) tablet 25 mg    ondansetron (ZOFRAN) injection 4 mg    iohexol (OMNIPAQUE 350) solution 90 mL    acetaminophen (TYLENOL) tablet 1,000 mg    metoclopramide (REGLAN) injection 10 mg    diphenhydrAMINE (BENADRYL) injection 12.5 mg       DIAGNOSTIC RESULTS / EMERGENCY DEPARTMENT COURSE / MDM   LAB RESULTS:  Results for orders placed or performed during the hospital encounter of 11/81/23   Basic Metabolic Panel w/ Reflex to MG   Result Value Ref Range    Glucose 101 (H) 70 - 99 mg/dL    BUN 17 8 - 23 mg/dL    CREATININE 0.85 0.50 - 0.90 mg/dL    Bun/Cre Ratio NOT REPORTED 9 - 20    Calcium 9.6 8.6 - 10.4 mg/dL    Sodium 140 135 - 144 mmol/L    Potassium 4.1 3.7 - 5.3 mmol/L    Chloride 101 98 - 107 mmol/L    CO2 24 20 - 31 mmol/L    Anion Gap 15 9 - 17 mmol/L    GFR Non-African American >60 >60 mL/min    GFR African American >60 >60 mL/min    GFR Comment          GFR Staging NOT REPORTED    Troponin   Result Value Ref Range    Troponin, High Sensitivity 9 0 - 14 ng/L    Troponin T NOT REPORTED <0.03 ng/mL    Troponin

## 2020-07-29 NOTE — PROGRESS NOTES
Patient initiated a phone visit to discuss her acute problems of severe dizziness and nausea. She has not been able to get out of her bed for 3 days. She has a spinning sensation with scan is consistent with vertigo. She has never had these symptoms before. Patient has numerous major past medical history including history of paroxysmal A. fib for which she sees cardiologist.  She is on Eliquis. She is on a beta-blocker. Last visit with cardiology was in January and at that time EKG showed normal sinus rhythm. Plan  Because of patient's age, comorbidities and severity of symptoms will advise she go to the ER for evaluation and possible MRI to rule out acute ischemic CVA in the cerebellar distribution. She may also need hydration and Valium and antiemetics. Attending Physician Statement  I have discussed the care of Alexandra Rayo, including pertinent history and exam findings,  with the resident. I have reviewed the key elements of all parts of the encounter with the resident. I agree with the assessment, plan and orders as documented by the resident.   (GE Modifier)

## 2020-07-29 NOTE — PATIENT INSTRUCTIONS
PC to pt-- spoke with her and advised her to go to ER for her dizziness.  She states that she is waiting on her ride to pick her up.    -LILLI Chavarria

## 2020-07-29 NOTE — ED NOTES
Pt. Reports to the ED by wheelchair to room 36 with c/o dizziness and a headache that has persisted for the last 3 days. Pt. States she called her pcp who told her tocome here for scans. Pt. States she feels like she falling but is not. Pt. Reports dizziness with no nausea or vomiting at this time. Pt. Has a hx. Of a brain mass and has follow up scans every year. Pt resting on stretcher, no respiratory distress noted, pt updated on plan of care, will continue to monitor, call light in reach.        Leticia Peters RN  07/29/20 [id number]

## 2020-07-29 NOTE — ED PROVIDER NOTES
[address] ED  Emergency Department  Faculty Sign-Out Addendum     Care of Michele Courser was assumed from previous attending and is being seen for Headache and Dizziness  . The patient's initial evaluation and plan have been discussed with the prior provider who initially evaluated the patient. Handoff taken on the following patient from prior Attending Physician:    Toshia Kemp    I was available and discussed any additional care issues that arose and coordinated the management plans with the resident(s) caring for the patient during my duty period. Any areas of disagreement with residents documentation of care or procedures are noted on the chart. I was personally present for the key portions of any/all procedures during my duty period. I have documented in the chart those procedures where I was not present during the key portions. EMERGENCY DEPARTMENT COURSE / MEDICAL DECISION MAKING:       MEDICATIONS GIVEN:  Orders Placed This Encounter   Medications    meclizine (ANTIVERT) tablet 25 mg    ondansetron (ZOFRAN) injection 4 mg       LABS / RADIOLOGY:     Labs Reviewed   BASIC METABOLIC PANEL W/ REFLEX TO MG FOR LOW K - Abnormal; Notable for the following components:       Result Value    Glucose 101 (*)     All other components within normal limits   APTT - Abnormal; Notable for the following components:    PTT 31.9 (*)     All other components within normal limits   TROPONIN   PROTIME-INR   CBC WITH AUTO DIFFERENTIAL       Xr Chest (2 Vw)    Result Date: 7/29/2020  EXAMINATION: TWO XRAY VIEWS OF THE CHEST 7/29/2020 5:18 pm COMPARISON: Chest x-ray, 02/07/2019 HISTORY: ORDERING SYSTEM PROVIDED HISTORY: vertigo TECHNOLOGIST PROVIDED HISTORY: vertigo FINDINGS: The cardiomediastinal contours are stable with cardiomegaly. The lungs are clear bilaterally. There is no evidence of focal consolidation, pulmonary edema, pleural effusion or pneumothorax.  There is no blunting of the costophrenic angles on the lateral view. Stable chest with cardiomegaly. No acute cardiopulmonary process. RECENT VITALS:     Temp: 98.2 °F (36.8 °C),  Pulse: 81, Resp: 20, BP: 103/79, SpO2: 100 %    This patient is a 78 y.o. Female with headache, dizziness. Sent in on PCP instruction. Prior history of brain mass. Pending CT/CTA head. Neuro consult after imaging.     OUTSTANDING TASKS / RECOMMENDATIONS:    1. CT scans      Ranjana Haskins MD, [facility]  Attending Emergency Physician  [address] ED       Sarika Condon MD  07/29/20 [id number]

## 2020-07-29 NOTE — ED NOTES
Pt. Resting on stretcher in room 36. Pt. Updated on plan of care. No questions at this time. Call light within reach and bed in lowest position. Will continue to monitor.       Chrystal Robin RN  07/29/20 0244

## 2020-07-30 PROBLEM — G43.009 MIGRAINE WITHOUT AURA AND WITHOUT STATUS MIGRAINOSUS, NOT INTRACTABLE: Status: ACTIVE | Noted: 2020-07-30

## 2020-07-30 LAB
ANION GAP SERPL CALCULATED.3IONS-SCNC: 13 MMOL/L (ref 9–17)
BUN BLDV-MCNC: 17 MG/DL (ref 8–23)
BUN/CREAT BLD: ABNORMAL (ref 9–20)
CALCIUM SERPL-MCNC: 9.1 MG/DL (ref 8.6–10.4)
CHLORIDE BLD-SCNC: 103 MMOL/L (ref 98–107)
CO2: 23 MMOL/L (ref 20–31)
CREAT SERPL-MCNC: 0.89 MG/DL (ref 0.5–0.9)
EKG ATRIAL RATE: 82 BPM
EKG P AXIS: 67 DEGREES
EKG P-R INTERVAL: 190 MS
EKG Q-T INTERVAL: 390 MS
EKG QRS DURATION: 102 MS
EKG QTC CALCULATION (BAZETT): 455 MS
EKG R AXIS: -13 DEGREES
EKG T AXIS: 62 DEGREES
EKG VENTRICULAR RATE: 82 BPM
ESTIMATED AVERAGE GLUCOSE: 174 MG/DL
GFR AFRICAN AMERICAN: >60 ML/MIN
GFR NON-AFRICAN AMERICAN: >60 ML/MIN
GFR SERPL CREATININE-BSD FRML MDRD: ABNORMAL ML/MIN/{1.73_M2}
GFR SERPL CREATININE-BSD FRML MDRD: ABNORMAL ML/MIN/{1.73_M2}
GLUCOSE BLD-MCNC: 118 MG/DL (ref 65–105)
GLUCOSE BLD-MCNC: 126 MG/DL (ref 65–105)
GLUCOSE BLD-MCNC: 141 MG/DL (ref 65–105)
GLUCOSE BLD-MCNC: 154 MG/DL (ref 65–105)
GLUCOSE BLD-MCNC: 161 MG/DL (ref 70–99)
GLUCOSE BLD-MCNC: 163 MG/DL (ref 65–105)
HBA1C MFR BLD: 7.7 % (ref 4–6)
HCT VFR BLD CALC: 40.7 % (ref 36.3–47.1)
HEMOGLOBIN: 12.6 G/DL (ref 11.9–15.1)
INR BLD: 1
MCH RBC QN AUTO: 29.2 PG (ref 25.2–33.5)
MCHC RBC AUTO-ENTMCNC: 31 G/DL (ref 28.4–34.8)
MCV RBC AUTO: 94.4 FL (ref 82.6–102.9)
NRBC AUTOMATED: 0 PER 100 WBC
PDW BLD-RTO: 14.6 % (ref 11.8–14.4)
PLATELET # BLD: 182 K/UL (ref 138–453)
PMV BLD AUTO: 9.9 FL (ref 8.1–13.5)
POTASSIUM SERPL-SCNC: 4.2 MMOL/L (ref 3.7–5.3)
PROTHROMBIN TIME: 10.6 SEC (ref 9–12)
RBC # BLD: 4.31 M/UL (ref 3.95–5.11)
SODIUM BLD-SCNC: 139 MMOL/L (ref 135–144)
WBC # BLD: 6.6 K/UL (ref 3.5–11.3)

## 2020-07-30 PROCEDURE — 99222 1ST HOSP IP/OBS MODERATE 55: CPT | Performed by: PSYCHIATRY & NEUROLOGY

## 2020-07-30 PROCEDURE — 6370000000 HC RX 637 (ALT 250 FOR IP): Performed by: PSYCHIATRY & NEUROLOGY

## 2020-07-30 PROCEDURE — 97166 OT EVAL MOD COMPLEX 45 MIN: CPT

## 2020-07-30 PROCEDURE — 93010 ELECTROCARDIOGRAM REPORT: CPT | Performed by: INTERNAL MEDICINE

## 2020-07-30 PROCEDURE — 99222 1ST HOSP IP/OBS MODERATE 55: CPT | Performed by: INTERNAL MEDICINE

## 2020-07-30 PROCEDURE — 83036 HEMOGLOBIN GLYCOSYLATED A1C: CPT

## 2020-07-30 PROCEDURE — 6370000000 HC RX 637 (ALT 250 FOR IP): Performed by: STUDENT IN AN ORGANIZED HEALTH CARE EDUCATION/TRAINING PROGRAM

## 2020-07-30 PROCEDURE — 97530 THERAPEUTIC ACTIVITIES: CPT

## 2020-07-30 PROCEDURE — 6370000000 HC RX 637 (ALT 250 FOR IP): Performed by: NURSE PRACTITIONER

## 2020-07-30 PROCEDURE — 85027 COMPLETE CBC AUTOMATED: CPT

## 2020-07-30 PROCEDURE — 1200000000 HC SEMI PRIVATE

## 2020-07-30 PROCEDURE — 80048 BASIC METABOLIC PNL TOTAL CA: CPT

## 2020-07-30 PROCEDURE — 2580000003 HC RX 258: Performed by: NURSE PRACTITIONER

## 2020-07-30 PROCEDURE — 97535 SELF CARE MNGMENT TRAINING: CPT

## 2020-07-30 PROCEDURE — 85610 PROTHROMBIN TIME: CPT

## 2020-07-30 PROCEDURE — 97162 PT EVAL MOD COMPLEX 30 MIN: CPT

## 2020-07-30 PROCEDURE — 82947 ASSAY GLUCOSE BLOOD QUANT: CPT

## 2020-07-30 PROCEDURE — 94660 CPAP INITIATION&MGMT: CPT

## 2020-07-30 RX ORDER — MECLIZINE HCL 12.5 MG/1
25 TABLET ORAL 3 TIMES DAILY
Status: DISCONTINUED | OUTPATIENT
Start: 2020-07-30 | End: 2020-08-04 | Stop reason: HOSPADM

## 2020-07-30 RX ORDER — ASPIRIN 81 MG/1
81 TABLET, CHEWABLE ORAL ONCE
Status: COMPLETED | OUTPATIENT
Start: 2020-07-30 | End: 2020-07-30

## 2020-07-30 RX ORDER — MECLIZINE HCL 12.5 MG/1
25 TABLET ORAL 3 TIMES DAILY PRN
Status: DISCONTINUED | OUTPATIENT
Start: 2020-07-30 | End: 2020-07-30

## 2020-07-30 RX ADMIN — INSULIN LISPRO 1 UNITS: 100 INJECTION, SOLUTION INTRAVENOUS; SUBCUTANEOUS at 22:48

## 2020-07-30 RX ADMIN — APIXABAN 5 MG: 5 TABLET, FILM COATED ORAL at 00:28

## 2020-07-30 RX ADMIN — ATORVASTATIN CALCIUM 80 MG: 80 TABLET, FILM COATED ORAL at 22:50

## 2020-07-30 RX ADMIN — OXYBUTYNIN CHLORIDE 10 MG: 10 TABLET, EXTENDED RELEASE ORAL at 08:59

## 2020-07-30 RX ADMIN — MECLIZINE HYDROCHLORIDE 25 MG: 12.5 TABLET, FILM COATED ORAL at 15:22

## 2020-07-30 RX ADMIN — ASPIRIN 81 MG 81 MG: 81 TABLET ORAL at 11:26

## 2020-07-30 RX ADMIN — FUROSEMIDE 20 MG: 20 TABLET ORAL at 08:59

## 2020-07-30 RX ADMIN — PANTOPRAZOLE SODIUM 40 MG: 40 TABLET, DELAYED RELEASE ORAL at 05:59

## 2020-07-30 RX ADMIN — METOPROLOL TARTRATE 25 MG: 25 TABLET ORAL at 00:28

## 2020-07-30 RX ADMIN — ATORVASTATIN CALCIUM 80 MG: 80 TABLET, FILM COATED ORAL at 00:28

## 2020-07-30 RX ADMIN — METOPROLOL TARTRATE 25 MG: 25 TABLET ORAL at 22:50

## 2020-07-30 RX ADMIN — METOPROLOL TARTRATE 25 MG: 25 TABLET ORAL at 08:59

## 2020-07-30 RX ADMIN — MECLIZINE HYDROCHLORIDE 25 MG: 12.5 TABLET, FILM COATED ORAL at 22:50

## 2020-07-30 RX ADMIN — INSULIN LISPRO 1 UNITS: 100 INJECTION, SOLUTION INTRAVENOUS; SUBCUTANEOUS at 18:13

## 2020-07-30 RX ADMIN — APIXABAN 5 MG: 5 TABLET, FILM COATED ORAL at 08:59

## 2020-07-30 RX ADMIN — Medication 10 ML: at 00:28

## 2020-07-30 RX ADMIN — Medication 10 ML: at 08:59

## 2020-07-30 RX ADMIN — APIXABAN 5 MG: 5 TABLET, FILM COATED ORAL at 22:50

## 2020-07-30 ASSESSMENT — PAIN DESCRIPTION - DESCRIPTORS
DESCRIPTORS: ACHING;DISCOMFORT;SORE;TENDER
DESCRIPTORS: ACHING

## 2020-07-30 ASSESSMENT — PAIN DESCRIPTION - PROGRESSION: CLINICAL_PROGRESSION: NOT CHANGED

## 2020-07-30 ASSESSMENT — PAIN SCALES - GENERAL
PAINLEVEL_OUTOF10: 0
PAINLEVEL_OUTOF10: 6
PAINLEVEL_OUTOF10: 5
PAINLEVEL_OUTOF10: 0
PAINLEVEL_OUTOF10: 0

## 2020-07-30 ASSESSMENT — PAIN DESCRIPTION - PAIN TYPE
TYPE: ACUTE PAIN
TYPE: ACUTE PAIN

## 2020-07-30 ASSESSMENT — PAIN DESCRIPTION - ONSET: ONSET: ON-GOING

## 2020-07-30 ASSESSMENT — PAIN DESCRIPTION - LOCATION
LOCATION: ABDOMEN;GROIN
LOCATION: HEAD

## 2020-07-30 ASSESSMENT — PAIN - FUNCTIONAL ASSESSMENT: PAIN_FUNCTIONAL_ASSESSMENT: PREVENTS OR INTERFERES SOME ACTIVE ACTIVITIES AND ADLS

## 2020-07-30 ASSESSMENT — PAIN DESCRIPTION - ORIENTATION
ORIENTATION: MID
ORIENTATION: MID;LOWER

## 2020-07-30 ASSESSMENT — PAIN DESCRIPTION - FREQUENCY
FREQUENCY: CONTINUOUS
FREQUENCY: CONTINUOUS

## 2020-07-30 NOTE — ED NOTES
Pt. Updated on plan of care. No other questions at this time. Call light within reach, bed in lowest position. Will continue to monitor.      Cheo Perez RN  07/29/20 [id number]

## 2020-07-30 NOTE — ED NOTES
Pt. Ester Raphael in room 36 on stretcher. Pt. Updated on plan of care. All questions answered at this time. Call light within reach and bed in lowest position.       Bhavani Silva RN  07/29/20 [id number]

## 2020-07-30 NOTE — ED NOTES
Pt resting on stretcher, no respiratory distress noted, pt updated on plan of care, will continue to monitor, call light in reach.        Maninder Riggins RN  07/29/20 0298

## 2020-07-30 NOTE — PROGRESS NOTES
Senior note      69-year-old lady past medical history of morbid obesity, obstructive sleep apnea, essential hypertension, type 2 diabetes mellitus without insulin use, CAD and A. fib on Eliquis presented to ED complaining of vertigo, as per patient she feels like the room is spinning around her and she also feels lightheadedness. Worsened by changing position. Started 3 days ago it has been going on. She has never had these symptoms before. Patient was asked to come to the ER. History of left frontal meningioma on MRI 2017. CT head done without any acute abnormality. Assessment and plan. 1. Vasodepressive syndrome. Diagnosed tilt table test 8/15/2018. Worsened with polypharmacy and marginal blood pressure. Follow-up on orthostatics. Need to discontinue amitriptyline. Follow-up neurology recommendations. 2. Paroxysmal A. fib. Resumed Eliquis 5 mg, Lopressor 25 mg twice daily. 3. History of CAD with stents. Stent placed in 2016. Patient not taking aspirin or Plavix. Will start the patient on aspirin 81 mg. Resumed Lopressor 25 mg, Lipitor 80 mg.    4. Obstructive sleep apnea. BiPAP overnight. 5.  Type 2 diabetes mellitus not on insulin. At home on metformin. Continue low-dose sliding scale with hypoglycemia protocol. 6.  Essential hypertension. Resumed home dose of Lopressor 25 mg twice daily given the history of atrial fibrillation, will hold Norvasc 10 mg and will monitor blood pressure and will readjust medications.     PT OT  Eliquis for DVT prophylaxis  Discharge planning after neurology recommendation    Loreto Aguilar MD  PGY-2, Internal medicine resident  [facility], [place], [place]

## 2020-07-30 NOTE — PROGRESS NOTES
Occupational Therapy   Occupational Therapy Initial Assessment  Date: 2020   Patient Name: Milvia Sorto  MRN: [id number]     : 1950    Date of Service: 2020    Discharge Recommendations: Further therapy recommended at discharge. Pt's main deficit is dizziness, if dizziness can be controlled pt will be safe to return home, CTA. OT Equipment Recommendations  Equipment Needed: Yes  Mobility Devices: ADL Assistive Devices; Walker  Walker: Rolling  ADL Assistive Devices: Sock-Aid Hard;Reacher;Long-handled Shoe Horn    Assessment   Performance deficits / Impairments: Decreased functional mobility ; Decreased endurance;Decreased balance;Decreased high-level IADLs;Decreased ADL status  Prognosis: Good  Decision Making: Medium Complexity  OT Education: OT Role;Plan of Care;ADL Adaptive Strategies;Transfer Training  REQUIRES OT FOLLOW UP: Yes  Activity Tolerance  Activity Tolerance: Patient Tolerated treatment well;Treatment limited secondary to medical complications (free text)  Activity Tolerance: Dizziness, clock-wise rotation of room according to pt, pt's main deficit is this vertigo  Safety Devices  Safety Devices in place: Yes  Type of devices: All fall risk precautions in place; Bed alarm in place; Left in bed;Nurse notified;Gait belt  Restraints  Initially in place: No         Patient Diagnosis(es): The encounter diagnosis was Vertigo.      has a past medical history of HOSSEIN (acute kidney injury) ([address] Arthritis, Chronic diastolic congestive heart failure ([address] Chronic kidney disease, Coronary artery disease involving native coronary artery of native heart without angina pectoris, Epistaxis, GERD (gastroesophageal reflux disease), Headache, Hyperlipidemia, Hypertension, MI (myocardial infarction) ([address] Neuromuscular disorder ([address] Osteoarthritis, Patient in clinical research study, Sleep apnea, Snores, Type II or unspecified type diabetes mellitus without mention of complication, not stated as uncontrolled, Use of cane as ambulatory aid, and Wears contact lenses. has a past surgical history that includes Hysterectomy (1979); Knee arthroscopy (Left); Total knee arthroplasty (Right, 07/21/2016); Coronary angioplasty with stent (2015); Total knee arthroplasty (Left, 03/2016); CNTRL NOSE BLEED LIGATION & NASAL ENDO (Right, 04/08/2019); Nasal sinus surgery (Right, 4/8/2019); and Turbinoplasties (Right, 4/8/2019).          Restrictions  Restrictions/Precautions  Restrictions/Precautions: General Precautions, Fall Risk  Required Braces or Orthoses?: No  Position Activity Restriction  Other position/activity restrictions: up with A, dizziness    Subjective   General  Patient assessed for rehabilitation services?: Yes  Family / Caregiver Present: No  Diagnosis: Dizziness, nausea, vasodepressive syndrome  Patient Currently in Pain: No  Pain Assessment  Pain Assessment: 0-10  Pain Level: 0  Oxygen Therapy  SpO2: 100 %  Social/Functional History  Social/Functional History  Lives With: Alone  Type of Home: Apartment(Senior living)  Home Layout: One level  Home Access: Level entry  Entrance Stairs - Number of Steps: 0  Bathroom Shower/Tub: Walk-in shower  Bathroom Toilet: Handicap height  Bathroom Equipment: Grab bars in shower, Shower chair  Bathroom Accessibility: Accessible  Home Equipment: Cane(uses PRN for longer distances, has pull cords in bathroom/bedroom)  ADL Assistance: Independent  Homemaking Assistance: Independent  Homemaking Responsibilities: Yes  Ambulation Assistance: Independent  Transfer Assistance: Independent  Active : Yes  Mode of Transportation: Car  Occupation: Retired  Leisure & Hobbies: 4 grandkids, [employer], puzzles  IADL Comments: Uses electric cart at store  Additional Comments: Dtr works but can check in on pt     Objective   Vision: Within Functional Limits  Vision Exceptions: Wears glasses for reading  Hearing: Within functional limits(However L ruptured eardrum per chart)  Hearing Exceptions: Hard of hearing/hearing concerns    Orientation  Overall Orientation Status: Within Functional Limits     Balance  Sitting Balance: Supervision  Standing Balance: Stand by assistance  Standing Balance  Time: 10 min  Activity: Pt stood bedside, sinkside, func mob to/from bathroom  Functional Mobility  Functional - Mobility Device: Rolling Walker  Activity: To/from bathroom  Assist Level: Stand by assistance  Functional Mobility Comments: RW improved balance, slow pace, dizziness actually improved when standing compared to sitting/supine  ADL  Feeding: Modified independent ;Setup  Grooming: Supervision;Setup; Increased time to complete  UE Bathing: Supervision; Increased time to complete  LE Bathing: Contact guard assistance; Increased time to complete  UE Dressing: Stand by assistance; Increased time to complete  LE Dressing: Moderate assistance; Increased time to complete(Pt simulated donning socks/pants sitting EOB, pt would highly benefit from a sock-aid/reacher)  Toileting: Increased time to complete;Contact guard assistance  Additional Comments: Pt stood sinkside for oral care, limited mainly by dizziness, vertigo did not increase when bending down to sink, pt was finishing breakfast upon arrival while supine with HOB elevated  Tone RUE  RUE Tone: Normotonic  Tone LUE  LUE Tone: Normotonic  Coordination  Movements Are Fluid And Coordinated: No  Coordination and Movement description: Decreased speed     Bed mobility  Supine to Sit: Contact guard assistance  Sit to Supine: Stand by assistance  Scooting: Stand by assistance  Comment: Took 3 attempts to sit EOB d/t dizziness, pt encouraged to keep eyes open, dzziness improved once standing EOB this date  Transfers  Sit to stand: Minimal assistance  Stand to sit: Contact guard assistance     Cognition  Overall Cognitive Status: WFL     Sensation  Overall Sensation Status: WFL     LUE AROM (degrees)  LUE AROM : WFL  RUE AROM (degrees)  RUE AROM : WFL  LUE

## 2020-07-30 NOTE — ED NOTES
Neurology at bedside     Huang Chan, [address] [facility]  07/29/20 [id number]

## 2020-07-30 NOTE — CARE COORDINATION
Case Management Initial Discharge Plan  Tr Monzon,         Met with:patient to discuss discharge plans. Information verified: address, contacts, phone number, , insurance Yes    Emergency Contact/Next of Kin name & number: Jenny Esparza [phone]    PCP: Vandana Harding MD  Date of last visit: Virtual visit in March or April of this year    Insurance Provider: [facility]    Discharge Planning    Living Arrangements:  Alone   Support Systems:  Friends/Neighbors, Family Members    Home is an apartment and pt lives on the main level  0 stairs to climb to get into front door    Patient able to perform ADL's:Independent    Current Services (outpatient & in home) none  DME equipment: C-PAP, cane, glucometer  DME provider:  n/a    Receiving oral anticoagulation therapy? Yes, Eliquis    If indicated:   Physician managing anticoagulation treatment: Dr. Cecilia Toledo  Where does patient obtain lab work for ATC treatment? n/a      Potential Assistance Needed:  Durable Medical Equipment    Patient agreeable to home care: No  [place] of choice provided:  n/a    Prior SNF/Rehab Placement and Facility: [address] [place] [address] to SNF/Rehab: No  [place] of choice provided: n/a     Evaluation: n/a    Expected Discharge date:  20    Patient expects to be discharged to:  Home independently  Follow Up Appointment: Best Day/ Time: Tuesday AM    Transportation provider: family  Transportation arrangements needed for discharge: No    Readmission Risk              Risk of Unplanned Readmission:        11             Does patient have a readmission risk score greater than 14?: No  If yes, follow-up appointment must be made within 7 days of discharge.      Goals of Care:       Discharge Plan: Home independently, needs new glucometer and strips, asking OP PT,           Electronically signed by Jovanna Hale RN on 20 at 11:37 AM EDT

## 2020-07-30 NOTE — CONSULTS
Department of Neurology/Neurosurgery                                          Resident Consult Note      Reason for Consult:  Vertigo, + hints exam, hx of meningioma   Requesting Physician:  Dr. Goldie Reynolds  Neurologist/Neurosurgeon: Dr. Danya Holcomb  History Obtained From:  patient, electronic medical record    CHIEF COMPLAINT:       Dizziness    HISTORY OF PRESENT ILLNESS:       79 y/o f h/o meningioma, chronic daily headaches, T2DM, HEMALATHA, HTN,  AF on eliquis, diastolic CHF presents with 3 day history of progressively worsening dizziness. The dizziness is like the room is spinning, and lasted initially for 1 minute at a time, now is about 3 minutes at a time. It is brought on  by standing, and she feels it goes away after she lays flat for a while. It is associated with nausea. She also had about 3 episodes of dramatic non-pulsatile tinnitus that lasted a brief time then stopped. When she stands and walks, she has to hold onto things, and close her eyes. Her legs also feel very heavy, and she feels short of breath with walking- these are new for her. Not assoc with sharp head movements. It is associated with a moderate HA, slightly worse than her daily headache. She feels this HA started 1 week ago when she got new glasses. No phono/photophobia or vomiting. No chest pain. No ear pain. She has not tried any medication for the dizziness. She reports having dizziness in the past assoc with HTN, but that she has had normal BP, and that this is different in that it is more severe. She's a patient of Dr. Danya Holcomb for migraine and HEMALATHA    On exam, she keeps her eyes mostly closed, and is holding her head in pain. She appears to have a left ruptured ear drum and erythematous surrounding IAC. HINTS with no nystagmus, a few beats b/l nystagmus on head impulse, and + skew on R eye. Admittted to IM for vertigo.      PAST MEDICAL HISTORY :       Past Medical History:        Diagnosis Date    [facility]IN (acute [facility]) ([facility] 3/3/2017    Arthritis     shoulders    Chronic diastolic congestive heart failure ([facility] 3/3/2017    Chronic kidney disease     Coronary artery disease involving native coronary artery of native heart without angina pectoris 10/13/2016    Epistaxis since Jan. 17 2019    GERD (gastroesophageal reflux disease)     Headache     Hyperlipidemia     Hypertension     MI (myocardial infarction) ([facility] 2015    Dr. Kallie Plummer Neuromuscular disorder [facility])     Osteoarthritis     Patient in clinical research study 03/06/2017    AbsorbIV    Sleep apnea     CPAP    Snores     Type II or unspecified type diabetes mellitus without mention of complication, not stated as uncontrolled     Use of cane as ambulatory aid     Wears contact lenses     LEFT EYE ONLY       Past Surgical History:        Procedure Laterality Date    CONTROL OF NOSE BLEED LIGATION & NASAL ENDOSCOPY Right 04/08/2019    turbinoplasty    CORONARY ANGIOPLASTY WITH STENT PLACEMENT  2015    2 stents/ Dr. Maribel Crocker, [address] [place] [address] ARTHROSCOPY Left     NASAL SINUS SURGERY Right 4/8/2019    NASAL ENDOSCOPIC, NASAL CAUTERY performed by Milton Serrano MD at [address] [place] [address] Right 07/21/2016    TOTAL KNEE ARTHROPLASTY Left 03/2016    TURBINOPLASTIES Right 4/8/2019    TURBINOPLASTY performed by Milton Serrano MD at [place] [address] History:   TOBACCO: denies  DRUGS:  Denies marijuana or cocaine  Patient currently lives home alone independent     Family History:       Problem Relation Age of Onset    Stroke Mother     Heart Disease Mother     Heart Disease Father     Heart Surgery Father 39    Migraines Sister     Heart Disease Brother     Heart Attack Brother 79    Stroke Brother     Other Son         Hit by drunken        Allergies:  Lisinopril; Pcn [penicillins];  Fish-derived products; and Losartan    Home Medications:  Prior to Admission medications Medication Sig Start Date End Date Taking? Authorizing Provider   meclizine (ANTIVERT) 12.5 MG tablet Take 1 tablet by mouth 3 times daily as needed for Dizziness 7/29/20 8/8/20 Yes Angel Horta MD   metFORMIN (GLUCOPHAGE) 1000 MG tablet TAKE 1 TABLET BY MOUTH TWICE DAILY WITH MEALS 7/14/20  Yes Anayeli Bruce MD   metoprolol tartrate (LOPRESSOR) 25 MG tablet TAKE 1 TABLET BY MOUTH TWICE DAILY 6/18/20  Yes Anayeli Bruce MD   amitriptyline (ELAVIL) 50 MG tablet TAKE 1 TABLET BY MOUTH EVERY NIGHT 6/18/20  Yes Anayeli Bruce MD   furosemide (LASIX) 20 MG tablet TAKE 1 TABLET BY MOUTH DAILY 6/18/20  Yes Anayeli Bruce MD   pantoprazole (PROTONIX) 40 MG tablet TAKE 1 TABLET EVERY MORNING (BEFORE BREAKFAST) 6/18/20  Yes Anayeli Bruce MD   amLODIPine (NORVASC) 10 MG tablet TAKE 1 TABLET BY MOUTH DAILY 6/9/20  Yes Anayeli Bruce MD   blood glucose monitor strips Test 3 times a day. Diag: DM II 5/21/20  Yes Anayeli Bruce MD   atorvastatin (LIPITOR) 80 MG tablet TAKE 1 TABLET BY MOUTH EVERY NIGHT 12/13/19  Yes Trevor Knapp MD   ELIQUIS 5 MG TABS tablet Take 5 mg by mouth 2 times daily 9/20/19  Yes Historical Provider, MD   oxybutynin (DITROPAN-XL) 10 MG extended release tablet Take 1 tablet by mouth daily 10/10/19  Yes Anayeli Bruce MD   nystatin (MYCOSTATIN) 690782 UNIT/GM powder Apply 3 times daily. 10/10/19  Yes Anayeli Bruce MD   Lancets MISC 1 each by Does not apply route daily Use 3 times daily Diagnisis:250.0  Diabetes Mellitus Non-Insulin Dependent 5/30/19  Yes Anayeli Bruce MD   nitroGLYCERIN (NITROSTAT) 0.4 MG SL tablet Place 1 tablet under the tongue every 5 minutes as needed for Chest pain up to max of 3 total doses.  If no relief after 1 dose, call 911. 5/30/18  Yes Carole Mendez MD   magnesium hydroxide (MILK OF MAGNESIA) 400 MG/5ML suspension Take 5 mLs by mouth daily as needed for Constipation   Yes Historical Provider, MD       Current Medications:   Current Facility-Administered Medications: aspirin chewable tablet 81 mg, 81 mg, Oral, Once  [Held by provider] amitriptyline (ELAVIL) tablet 50 mg, 50 mg, Oral, Nightly  [Held by provider] amLODIPine (NORVASC) tablet 10 mg, 10 mg, Oral, Daily  atorvastatin (LIPITOR) tablet 80 mg, 80 mg, Oral, Nightly  apixaban (ELIQUIS) tablet 5 mg, 5 mg, Oral, BID  furosemide (LASIX) tablet 20 mg, 20 mg, Oral, Daily  metoprolol tartrate (LOPRESSOR) tablet 25 mg, 25 mg, Oral, BID  oxybutynin (DITROPAN-XL) extended release tablet 10 mg, 10 mg, Oral, Daily  pantoprazole (PROTONIX) tablet 40 mg, 40 mg, Oral, QAM AC  sodium chloride flush 0.9 % injection 10 mL, 10 mL, Intravenous, 2 times per day  sodium chloride flush 0.9 % injection 10 mL, 10 mL, Intravenous, PRN  potassium chloride (KLOR-CON M) extended release tablet 40 mEq, 40 mEq, Oral, PRN **OR** potassium bicarb-citric acid (EFFER-K) effervescent tablet 40 mEq, 40 mEq, Oral, PRN **OR** potassium chloride 10 mEq/100 mL IVPB (Peripheral Line), 10 mEq, Intravenous, PRN  magnesium sulfate 1 g in dextrose 5% 100 mL IVPB, 1 g, Intravenous, PRN  acetaminophen (TYLENOL) tablet 650 mg, 650 mg, Oral, Q6H PRN **OR** acetaminophen (TYLENOL) suppository 650 mg, 650 mg, Rectal, Q6H PRN  polyethylene glycol (GLYCOLAX) packet 17 g, 17 g, Oral, Daily PRN  promethazine (PHENERGAN) tablet 12.5 mg, 12.5 mg, Oral, Q6H PRN **OR** ondansetron (ZOFRAN) injection 4 mg, 4 mg, Intravenous, Q6H PRN  nicotine (NICODERM CQ) 21 MG/24HR 1 patch, 1 patch, Transdermal, Daily PRN  glucose (GLUTOSE) 40 % oral gel 15 g, 15 g, Oral, PRN  dextrose 50 % IV solution, 12.5 g, Intravenous, PRN  glucagon (rDNA) injection 1 mg, 1 mg, Intramuscular, PRN  dextrose 5 % solution, 100 mL/hr, Intravenous, PRN  insulin lispro (HUMALOG) injection vial 0-6 Units, 0-6 Units, Subcutaneous, TID WC  insulin lispro (HUMALOG) injection vial 0-3 Units, 0-3 Units, Subcutaneous, Nightly    REVIEW OF SYSTEMS:       Constitutional: no fever, chills, fatigue  HENT: No change in vision or hearing   Respiratory: No cough, wheezing. +SOUZA  Cardiovascular:  No chest pain, palpitations, leg swelling. Gastrointestinal: No vomiting, diarrhea. +nausea  Genitourinary: No increased frequency, urgency. Musculoskeletal: No myalgia or arthralgia. Neurological: No paresthesia, or focal weakness. +headache, bilateral leg weakness. Endo/Heme/Allergies: Negative for itchy eyes or runny nose. Psychiatric/Behavioral: No anxiety or depressed mood. Review of systems otherwise negative. PHYSICAL EXAM:       /76   Pulse 62   Temp 98.3 °F (36.8 °C) (Oral)   Resp 16   Ht 5' 9\" (1.753 m)   Wt 297 lb (134.7 kg)   SpO2 98%   BMI 43.86 kg/m²    General Examination    General Resting comfortably in bed   Head Normocephalic, without obvious abnormality   Neck Supple, symmetrical. Good ROM. No midline or paraspinal tenderness. Lungs Respirations unlabored, no wheezing   Chest Wall No deformity   Heart RRR, no murmur   Abdomen Soft. Non-tender, non-distended   Extremities No cyanosis or warmth. Pulses 2+ and symmetric   Skin: Skin  turgor normal, no rashes or lesions     Mental status  Speech Alert. Oriented to person, place, and time. Speech is fluent without paraphasic errors  Good repetition and naming  Can do 1 step, 2 step, commands  Language appropriate. Cranial nerves   II - VFF  III, IV, VI - extra-ocular muscles full. No nystagmus. Pupils symmetric and responsive. V - sensation symmetric         VII -  No facial droop or asymmetric NLF  VIII - intact hearing to conversational tone          IX, X - symmetrical palate elevation   XI - 5/5 strength symmetric  XII - tongue midline    HINTS: head impulse 2 beats b/l symmetric. No nystagmus. + skew in right eye.    Motor function  Strength: grossly 5/5 in b/l     grossly 5/5 in b/l Deltoid, biceps, triceps, wrist flexion, wrist extension    4/5 Hip flexion/extension, 5/5  knee flexion/extension, plantar flexion  Bulk: grossly normal no atrophy  Tone: symmetric b/l arms and legs  Abnormal movements: No abnormal movements or tremor   Sensory function Symmetric to touch in all extremities bilaterally   Cerebellar No dysmetria or dysdiadochocinesia    Reflex function DTR: 2+ b/l symmetric in biceps, brachioradialis, patellar, calcaneal  Babinski b/l plantar downgoing   Gait                  Not assessed     LABS AND IMAGING:       CBC with Differential:    Lab Results   Component Value Date    WBC 8.4 07/29/2020    RBC 4.75 07/29/2020    RBC 4.16 11/14/2019    HGB 13.7 07/29/2020    HCT 44.0 07/29/2020     07/29/2020    MCV 92.6 07/29/2020    MCH 28.8 07/29/2020    MCHC 31.1 07/29/2020    RDW 14.4 07/29/2020    NRBC 0 11/14/2019    LYMPHOPCT 34 07/29/2020    LYMPHOPCT 29.3 11/14/2019    MONOPCT 10 07/29/2020    MONOPCT 8.1 11/14/2019    BASOPCT 0 07/29/2020    BASOPCT 0.3 11/14/2019    MONOSABS 0.83 07/29/2020    MONOSABS 0.6 11/14/2019    LYMPHSABS 2.81 07/29/2020    LYMPHSABS 2.1 11/14/2019    EOSABS 0.11 07/29/2020    EOSABS 0.2 11/14/2019    BASOSABS 0.03 07/29/2020    DIFFTYPE NOT REPORTED 07/29/2020     CMP:    Lab Results   Component Value Date     07/29/2020    K 4.1 07/29/2020     07/29/2020    CO2 24 07/29/2020    BUN 17 07/29/2020    CREATININE 0.85 07/29/2020    GFRAA >60 07/29/2020    LABGLOM >60 07/29/2020    GLUCOSE 101 07/29/2020    GLUCOSE 178 11/14/2019    PROT 7.1 11/14/2019    LABALBU 3.7 11/14/2019    CALCIUM 9.6 07/29/2020    BILITOT 0.3 11/14/2019    ALKPHOS 96 11/14/2019    AST 23 11/14/2019    ALT 15 11/14/2019     BMP:    Lab Results   Component Value Date     07/29/2020    K 4.1 07/29/2020     07/29/2020    CO2 24 07/29/2020    BUN 17 07/29/2020    LABALBU 3.7 11/14/2019    CREATININE 0.85 07/29/2020    CALCIUM 9.6 07/29/2020    GFRAA >60 07/29/2020    LABGLOM >60 07/29/2020    GLUCOSE 101 07/29/2020    GLUCOSE 178 11/14/2019       Radiology Review:      [facility] 7/29/2020- No acute intracranial [phone]

## 2020-07-30 NOTE — H&P
[facility]     Department of Internal Medicine - Staff Internal Medicine Teaching Service          ADMISSION NOTE/HISTORY AND PHYSICAL EXAMINATION   Date: 7/30/2020  Patient Name: Nayely Pineda  Date of admission: 7/29/2020  4:33 PM  YOB: 1950  PCP: Letty Son MD  History Obtained From:  patient, electronic medical record    CHIEF COMPLAINT     Chief complaint: Spinning sensation    HISTORY OF PRESENTING ILLNESS     The patient is a pleasant 78 y.o. female presented to the ER for evaluation of dizziness and nausea for last 3 days  as recommended by Dr. Zohaib Lopez, during her office phone visit. Patient states that she is not able to get out of her bed for the last 3 days. She has a spinning sensation when she tries to get out of bed. Patient describes dizziness as room spinning, she states that when she gets up she nearly falls over and has to hold onto the wall and then immediately needs to sit back down, however when she does stand she feels the room is spinning. There is history of nausea and tinnitus but no vomiting. Patient feels weak. Patient states this has never happened before. Patient denies any syncopal episode. Patient tried over-the-counter motion sickness medication but did not get any relief. Patient has history of A. fib, on Eliquis. History of hypertension(on amlodipine and Lopressor), diastolic CHF(on Lasix) with ejection fraction more than 65%(ECHO 2/19) . No history of recent viral URI symptoms, denies any head trauma, denied any previous history of vertigo. Patient has history of meningioma and goes yearly MRI to monitor for any growth. History of DM type II. Previous MI on Eliquis, atrial fibrillation. Last MRI appears to be in 2017 showed a stable left frontal meningioma. Patient recently took antibiotics for right lower extremity Cellulitis. In ER chest Xray show Cardiomegaly.    CT Head wo contrast: No acute intracranial findings. CTA Head Neck w contrast: No acute arterial abnormality. B sugar: 154, PTT 31.9. Amitriptyline and amlodipine were held. Orthostatics were negative. PAST MEDICAL HISTORY     Past Medical History:   Diagnosis Date    HOSSEIN (acute kidney injury) ([facility] 3/3/2017    Arthritis     shoulders    Chronic diastolic congestive heart failure ([facility] 3/3/2017    Chronic kidney disease     Coronary artery disease involving native coronary artery of native heart without angina pectoris 10/13/2016    Epistaxis since Jan. 17 2019    GERD (gastroesophageal reflux disease)     Headache     Hyperlipidemia     Hypertension     MI (myocardial infarction) ([facility] 2015    Dr. Onesimo Gao Neuromuscular disorder [facility])     Osteoarthritis     Patient in clinical research study 03/06/2017    AbsorbIV    Sleep apnea     CPAP    Snores     Type II or unspecified type diabetes mellitus without mention of complication, not stated as uncontrolled     Use of cane as ambulatory aid     Wears contact lenses     LEFT EYE ONLY       PAST SURGICAL HISTORY     Past Surgical History:   Procedure Laterality Date    CONTROL OF NOSE BLEED LIGATION & NASAL ENDOSCOPY Right 04/08/2019    turbinoplasty    CORONARY ANGIOPLASTY WITH STENT PLACEMENT  2015    2 stents/ Dr. Kylee Christy, [address] [place] [address] ARTHROSCOPY Left     NASAL SINUS SURGERY Right 4/8/2019    NASAL ENDOSCOPIC, NASAL CAUTERY performed by Mushtaq Adkins MD at [address] Right 07/21/2016    TOTAL KNEE ARTHROPLASTY Left 03/2016    TURBINOPLASTIES Right 4/8/2019    TURBINOPLASTY performed by Mushtaq Adkins MD at [address]     Lisinopril; Pcn [penicillins]; Fish-derived products; and Losartan    MEDICATIONS PRIOR TO ADMISSION     Prior to Admission medications    Medication Sig Start Date End Date Taking?  Authorizing Provider   meclizine (ANTIVERT) 12.5 MG tablet Take 1 tablet by mouth 3 times daily as needed for Dizziness 7/29/20 8/8/20 Yes Sanjuanita Frazier MD   metFORMIN (GLUCOPHAGE) 1000 MG tablet TAKE 1 TABLET BY MOUTH TWICE DAILY WITH MEALS 7/14/20  Yes Belkis Maria MD   metoprolol tartrate (LOPRESSOR) 25 MG tablet TAKE 1 TABLET BY MOUTH TWICE DAILY 6/18/20  Yes Belkis Maria MD   amitriptyline (ELAVIL) 50 MG tablet TAKE 1 TABLET BY MOUTH EVERY NIGHT 6/18/20  Yes Belkis Maria MD   furosemide (LASIX) 20 MG tablet TAKE 1 TABLET BY MOUTH DAILY 6/18/20  Yes Belkis Maria MD   pantoprazole (PROTONIX) 40 MG tablet TAKE 1 TABLET EVERY MORNING (BEFORE BREAKFAST) 6/18/20  Yes Belkis Maria MD   amLODIPine (NORVASC) 10 MG tablet TAKE 1 TABLET BY MOUTH DAILY 6/9/20  Yes Belkis Maria MD   blood glucose monitor strips Test 3 times a day. Diag: DM II 5/21/20  Yes Blekis Maria MD   atorvastatin (LIPITOR) 80 MG tablet TAKE 1 TABLET BY MOUTH EVERY NIGHT 12/13/19  Yes Cecelia Pleitez MD   ELIQUIS 5 MG TABS tablet Take 5 mg by mouth 2 times daily 9/20/19  Yes Historical Provider, MD   oxybutynin (DITROPAN-XL) 10 MG extended release tablet Take 1 tablet by mouth daily 10/10/19  Yes Belkis Maria MD   nystatin (MYCOSTATIN) 038830 UNIT/GM powder Apply 3 times daily. 10/10/19  Yes Belkis Maria MD   Lancets MISC 1 each by Does not apply route daily Use 3 times daily Diagnisis:250.0  Diabetes Mellitus Non-Insulin Dependent 5/30/19  Yes Belkis Maria MD   nitroGLYCERIN (NITROSTAT) 0.4 MG SL tablet Place 1 tablet under the tongue every 5 minutes as needed for Chest pain up to max of 3 total doses. If no relief after 1 dose, call 911. 5/30/18  Yes Cheri Clark MD   magnesium hydroxide (MILK OF MAGNESIA) 400 MG/5ML suspension Take 5 mLs by mouth daily as needed for Constipation   Yes Historical Provider, MD       SOCIAL HISTORY     Tobacco: smoking for last 30 yrs.       FAMILY HISTORY     Family History   Problem Relation Age of Onset    Stroke Mother     Heart Disease Mother     Heart Disease Father    [employer] Father 39    Migraines Sister     Heart Disease Brother     Heart Attack Brother 79    Stroke Brother     Other Son         Hit by drunken        PHYSICAL EXAM     Vitals: /76   Pulse 62   Temp 98.3 °F (36.8 °C) (Oral)   Resp 16   Ht 5' 9\" (1.753 m)   Wt 297 lb (134.7 kg)   SpO2 98%   BMI 43.86 kg/m²   Tmax: Temp (24hrs), Av.2 °F (36.8 °C), Min:98.2 °F (36.8 °C), Max:98.3 °F (36.8 °C)    Last Body weight:   Wt Readings from Last 3 Encounters:   20 297 lb (134.7 kg)   20 296 lb 9.6 oz (134.5 kg)   10/10/19 293 lb 9.6 oz (133.2 kg)     Body Mass Index : Body mass index is 43.86 kg/m². PHYSICAL EXAMINATION:  Constitutional: This is a well developed, well nourished, Greater than 40 - Morbid Obesity / Extreme Obesity / Grade III 78y.o. year old female who is alert, oriented, cooperative and in no apparent distress. Head:normocephalic and atraumatic. EENT: Pt has hearing loss on left ear. Neck: Supple without thyromegaly. Respiratory: Chest was symmetrical without dullness to percussion. Cardiovascular: Regular without murmur, clicks, gallops or rubs. Abdomen: Slightly rounded and soft without organomegaly. Musculoskeletal: Normal curvature of the spine. Pt unable to raise her legs against gravity. Extremities:   lower extremity edema and tenderness present. no varicosities or erythema. Scar anabel b/l knee s/p knee replacement surgery (2016). Skin:  Warm and dry.     Neurological/Psychiatric: The patient's general behavior, level of consciousness, thought content and emotional status is normal.          INVESTIGATIONS     Laboratory Testing:     Recent Results (from the past 24 hour(s))   Basic Metabolic Panel w/ Reflex to MG    Collection Time: 20  4:57 PM   Result Value Ref Range    Glucose 101 (H) 70 - 99 mg/dL    BUN 17 8 - 23 mg/dL    CREATININE 0.85 0.50 - 0.90 mg/dL    Bun/Cre Ratio NOT REPORTED 9 - 20    Calcium 9.6 8.6 - 10.4 mg/dL    Sodium 140 135 - 144 mmol/L    Potassium 4.1 3.7 - 5.3 mmol/L    Chloride 101 98 - 107 mmol/L    CO2 24 20 - 31 mmol/L    Anion Gap 15 9 - 17 mmol/L    GFR Non-African American >60 >60 mL/min    GFR African American >60 >60 mL/min    GFR Comment          GFR Staging NOT REPORTED    Troponin    Collection Time: 07/29/20  4:57 PM   Result Value Ref Range    Troponin, High Sensitivity 9 0 - 14 ng/L    Troponin T NOT REPORTED <0.03 ng/mL    Troponin Interp NOT REPORTED    Protime-INR    Collection Time: 07/29/20  4:57 PM   Result Value Ref Range    Protime 11.0 9.0 - 12.0 sec    INR 1.0    APTT    Collection Time: 07/29/20  4:57 PM   Result Value Ref Range    PTT 31.9 (H) 20.5 - 30.5 sec   CBC Auto Differential    Collection Time: 07/29/20  4:57 PM   Result Value Ref Range    WBC 8.4 3.5 - 11.3 k/uL    RBC 4.75 3.95 - 5.11 m/uL    Hemoglobin 13.7 11.9 - 15.1 g/dL    Hematocrit 44.0 36.3 - 47.1 %    MCV 92.6 82.6 - 102.9 fL    MCH 28.8 25.2 - 33.5 pg    MCHC 31.1 28.4 - 34.8 g/dL    RDW 14.4 11.8 - 14.4 %    Platelets 097 326 - 596 k/uL    MPV 9.8 8.1 - 13.5 fL    NRBC Automated 0.0 0.0 per 100 WBC    Differential Type NOT REPORTED     Seg Neutrophils 55 36 - 65 %    Lymphocytes 34 24 - 43 %    Monocytes 10 3 - 12 %    Eosinophils % 1 1 - 4 %    Basophils 0 0 - 2 %    Immature Granulocytes 0 0 %    Segs Absolute 4.55 1.50 - 8.10 k/uL    Absolute Lymph # 2.81 1.10 - 3.70 k/uL    Absolute Mono # 0.83 0.10 - 1.20 k/uL    Absolute Eos # 0.11 0.00 - 0.44 k/uL    Basophils Absolute 0.03 0.00 - 0.20 k/uL    Absolute Immature Granulocyte <0.03 0.00 - 0.30 k/uL    WBC Morphology NOT REPORTED     RBC Morphology NOT REPORTED     Platelet Estimate NOT REPORTED        Imaging:   Xr Chest (2 Vw)    Result Date: 7/29/2020  Stable chest with cardiomegaly. No acute cardiopulmonary process. Ct Head Wo Contrast    Result Date: 7/29/2020  No acute intracranial findings.      Cta Head Neck W Contrast    Result Date: 7/29/2020  No acute arterial abnormality or hemodynamically significant arterial stenosis in the head or neck. ASSESSMENT & PLAN     1. Vasodepressive syndrome: Diagnostic table test 8/15/2018. Possibly worsened with polypharmacy. Follow-up on orthostatics. Consider discontinuing amitriptyline. Recommend MRI Brain. Neurology recommended work up for Suspected Otitis Media with rupture. Will follow up out patient to evaluate for Pseudotumor Cerbri. Outpatient follow up with Neurosurgery for Meningioma. Orthostatics were negative. 2.  Paroxysmal A. Fib: Resume Eliquis chong 5 mg, Lopressor 25 mg twice daily. 3.  CAD s/p stent placement: Stent placed in 2016 and 2018. Patient not on aspirin or Plavix. Aspirin 81 mg started. Resumed Lopressor 25 mg, Lipitor 80 mg. Recommend ECHO. 4.  HEMALATHA: Patient uses BiPAP overnight. 5.  Type II DM: Blood sugar on admission was 154. Takes metformin at home. Continue low-dose sliding scale with hypoglycemia protocol. 6.  Essential hypertension: Started on home dose of Lopressor 25 mg twice daily, will hold Norvasc 10 mg and will monitor blood pressure. DVT ppx: Eliquis  GI ppx: Protonix    PT/OT/SW: Working  Discharge Planning: As per neurology recommendation    Sarah Martin MD  Internal Medicine Resident, PGY-1  [facility];  [place], [place]  7/30/2020, 2:27 AM

## 2020-07-30 NOTE — ED NOTES
Pt. Ambulates to restroom with wheelchair for support with an upright and steady gait.      Jean-Pierre Huizar RN  07/29/20 [id number]

## 2020-07-30 NOTE — PROGRESS NOTES
Physical Therapy    Facility/Department: [facility] RENAL//MED SURG  Initial Assessment    NAME: Jenny Castro  : 1950  MRN: [id number]    Date of Service: 2020  Discharge Recommendations:  Patient would benefit from continued therapy after discharge   Assessment   Body structures, Functions, Activity limitations: Decreased functional mobility ; Decreased endurance;Decreased strength;Decreased balance  Assessment: The pt ambulated 25 ft with a RW x CGA, She was hampered by dizziness during mobilization. She could benefit from a continuation of PT following her DC  Prognosis: Good  Decision Making: Medium Complexity  PT Education: Goals;PT Role;Plan of Care  REQUIRES PT FOLLOW UP: Yes  Activity Tolerance  Activity Tolerance: Patient limited by fatigue   Patient Diagnosis(es): The encounter diagnosis was Vertigo. has a past medical history of HOSSEIN (acute kidney injury) ([address] Arthritis, Chronic diastolic congestive heart failure ([address] Chronic kidney disease, Coronary artery disease involving native coronary artery of native heart without angina pectoris, Epistaxis, GERD (gastroesophageal reflux disease), Headache, Hyperlipidemia, Hypertension, MI (myocardial infarction) ([address] Neuromuscular disorder ([address] Osteoarthritis, Patient in clinical research study, Sleep apnea, Snores, Type II or unspecified type diabetes mellitus without mention of complication, not stated as uncontrolled, Use of cane as ambulatory aid, and Wears contact lenses. has a past surgical history that includes Hysterectomy (); Knee arthroscopy (Left); Total knee arthroplasty (Right, 2016); Coronary angioplasty with stent (); Total knee arthroplasty (Left, 2016); CNTRL NOSE BLEED LIGATION & NASAL ENDO (Right, 2019); Nasal sinus surgery (Right, 2019); and Turbinoplasties (Right, 2019).   Restrictions  Restrictions/Precautions  Restrictions/Precautions: General Precautions, Fall Risk  Required Braces or Orthoses?: No  Required Braces or Orthoses  Other: Abdominal Binder(.)  Position Activity Restriction  Sternal Precautions: 10# Lifting Restrictions  Other position/activity restrictions:  Up with assist  Vision/Hearing  Vision: Impaired  Vision Exceptions: Wears glasses for reading  Hearing: Within functional limits  Hearing Exceptions: Hard of hearing/hearing concerns     Subjective  General  Patient assessed for rehabilitation services?: Yes  Response To Previous Treatment: Not applicable  Family / Caregiver Present: No  Follows Commands: Within Functional Limits  Subjective  Subjective: RN and pt agreeable to therapy eval  Pain Screening  Patient Currently in Pain: Denies  Pain Assessment  Pain Assessment: 0-10  Pain Level: 0  Vital Signs  Patient Currently in Pain: Denies     Orientation  Orientation  Overall Orientation Status: Within Normal Limits  Social/Functional History  Social/Functional History  Lives With: Alone  Type of Home: Apartment(Senior living)  Home Layout: One level  Home Access: Level entry  Entrance Stairs - Number of Steps: 0  Bathroom Shower/Tub: Walk-in shower  Bathroom Toilet: Handicap height  Bathroom Equipment: Grab bars in shower, Shower chair  Bathroom Accessibility: Accessible  Home Equipment: Cane(uses PRN for longer distances, has pull cords in bathroom/bedroom)  ADL Assistance: [address] [place] [address] Avenue: Independent  Homemaking Responsibilities: Yes  Ambulation Assistance: Independent  Transfer Assistance: Independent  Active : Yes  Mode of Transportation: Car  Occupation: Retired  Leisure & Hobbies: 4 grandkids, Muslim, puzzles  IADL Comments: Uses electric cart at store  Additional Comments: Dtr works but can check in on pt  Cognition      Objective     AROM RLE (degrees)  RLE AROM: WFL  AROM LLE (degrees)  LLE AROM : WFL  AROM RUE (degrees)  RUE AROM : WFL  AROM LUE (degrees)  LUE AROM : WFL  Strength RLE  Strength RLE: WFL  Strength LLE  Strength LLE: WFL  Strength RUE  Strength RUE: WFL  Strength LUE  Strength LUE: WFL     Sensation  Overall Sensation Status: WFL  Bed mobility  Supine to Sit: Contact guard assistance  Sit to Supine: Contact guard assistance  Scooting: Contact guard assistance  Transfers  Sit to Stand: Contact guard assistance  Stand to sit: Contact guard assistance  Ambulation  Ambulation?: Yes  Ambulation 1  Surface: level tile  Device: Rolling Walker  Assistance: Contact guard assistance  Distance: amb 25 ft with a RW x CGA   C/o dizziness with transfers  Balance  Posture: Good  Sitting - Static: Good  Sitting - Dynamic: Poor  Standing - Static: Fair  Standing - Dynamic: [facility]  Times per week: 5-6x wk  Current Treatment Recommendations: Strengthening, Gait Training, Stair training, Balance Training, Functional Mobility Training, Endurance Training, Safety Education & Training  Safety Devices  Type of devices: Left in bed, Nurse notified, Call light within reach    G-Code     OutComes Score     AM-PAC Score  AM-PAC Inpatient Mobility Raw Score : 18 (07/30/20 1537)  AM-PAC Inpatient T-Scale Score : 43.63 (07/30/20 1537)  Mobility Inpatient CMS 0-100% Score: 46.58 (07/30/20 1537)  Mobility Inpatient CMS G-Code Modifier : CK (07/30/20 1537)     Goals  Short term goals  Time Frame for Short term goals: 10 visits  Short term goal 1: transfers with SBA  Short term goal 2: amb 150 ft with a RW x SBA  Short term goal 3: ascend/descend 4 steps with SBA  Short term goal 4: exercise program x SBA  Patient Goals   Patient goals : Rerurn home     Therapy Time   Individual Concurrent Group Co-treatment   Time In 5980         Time Out 1330         Minutes 25             1 of [facility], PT

## 2020-07-30 NOTE — ED PROVIDER NOTES
[facility] RENAL//MED SURG  Emergency Department  Emergency Medicine Resident Sign-out     Care of Itz Feldman was assumed from Dr. Dianna Mccoy and is being seen for Headache and Dizziness  . The patient's initial evaluation and plan have been discussed with the prior provider who initially evaluated the patient.      EMERGENCY DEPARTMENT COURSE / MEDICAL DECISION MAKING:       MEDICATIONS GIVEN:  Orders Placed This Encounter   Medications    meclizine (ANTIVERT) tablet 25 mg    ondansetron (ZOFRAN) injection 4 mg    iohexol (OMNIPAQUE 350) solution 90 mL    acetaminophen (TYLENOL) tablet 1,000 mg    metoclopramide (REGLAN) injection 10 mg    diphenhydrAMINE (BENADRYL) injection 12.5 mg    amitriptyline (ELAVIL) tablet 50 mg    amLODIPine (NORVASC) tablet 10 mg    atorvastatin (LIPITOR) tablet 80 mg    apixaban (ELIQUIS) tablet 5 mg    furosemide (LASIX) tablet 20 mg    metoprolol tartrate (LOPRESSOR) tablet 25 mg    oxybutynin (DITROPAN-XL) extended release tablet 10 mg    pantoprazole (PROTONIX) tablet 40 mg    sodium chloride flush 0.9 % injection 10 mL    sodium chloride flush 0.9 % injection 10 mL    OR Linked Order Group     potassium chloride (KLOR-CON M) extended release tablet 40 mEq     potassium bicarb-citric acid (EFFER-K) effervescent tablet 40 mEq     potassium chloride 10 mEq/100 mL IVPB (Peripheral Line)    magnesium sulfate 1 g in dextrose 5% 100 mL IVPB    OR Linked Order Group     acetaminophen (TYLENOL) tablet 650 mg     acetaminophen (TYLENOL) suppository 650 mg    polyethylene glycol (GLYCOLAX) packet 17 g    OR Linked Order Group     promethazine (PHENERGAN) tablet 12.5 mg     ondansetron (ZOFRAN) injection 4 mg    nicotine (NICODERM CQ) 21 MG/24HR 1 patch    glucose (GLUTOSE) 40 % oral gel 15 g    dextrose 50 % IV solution    glucagon (rDNA) injection 1 mg    dextrose 5 % solution    insulin lispro (HUMALOG) injection vial 0-6 Units    insulin lispro (HUMALOG) injection vial 0-3 Units    aspirin chewable tablet 81 mg       LABS / RADIOLOGY:     Labs Reviewed   BASIC METABOLIC PANEL W/ REFLEX TO MG FOR LOW K - Abnormal; Notable for the following components:       Result Value    Glucose 101 (*)     All other components within normal limits   APTT - Abnormal; Notable for the following components:    PTT 31.9 (*)     All other components within normal limits   POC GLUCOSE FINGERSTICK - Abnormal; Notable for the following components:    POC Glucose 154 (*)     All other components within normal limits   TROPONIN   PROTIME-INR   CBC WITH AUTO DIFFERENTIAL   BASIC METABOLIC PANEL W/ REFLEX TO MG FOR LOW K   CBC   PROTIME-INR   HEMOGLOBIN A1C   POCT GLUCOSE   POCT GLUCOSE   POCT GLUCOSE       Xr Chest (2 Vw)    Result Date: 7/29/2020  EXAMINATION: TWO XRAY VIEWS OF THE CHEST 7/29/2020 5:18 pm COMPARISON: Chest x-ray, 02/07/2019 HISTORY: ORDERING SYSTEM PROVIDED HISTORY: vertigo TECHNOLOGIST PROVIDED HISTORY: vertigo FINDINGS: The cardiomediastinal contours are stable with cardiomegaly. The lungs are clear bilaterally. There is no evidence of focal consolidation, pulmonary edema, pleural effusion or pneumothorax. There is no blunting of the costophrenic angles on the lateral view. Stable chest with cardiomegaly. No acute cardiopulmonary process. Ct Head Wo Contrast    Result Date: 7/29/2020  EXAMINATION: CT OF THE HEAD WITHOUT CONTRAST 7/29/2020 7:54 pm TECHNIQUE: CT of the head was performed without the administration of intravenous contrast. Dose modulation, iterative reconstruction, and/or weight based adjustment of the mA/kV was utilized to reduce the radiation dose to as low as reasonably achievable. COMPARISON: September 8, 2017 HISTORY: ORDERING SYSTEM PROVIDED HISTORY: Vertigo, on elliquis, Hx of brain mass TECHNOLOGIST PROVIDED HISTORY: Vertigo, on elliquis, Hx of brain mass FINDINGS: No acute intracranial hemorrhage. No mass effect.   No midline shift. Ventricles and sulci are within normal limits. Partially empty sella. No acute soft tissue abnormality. No acute orbital abnormality. No acute osseous abnormality. No significant paranasal sinus disease. No acute intracranial findings. Cta Head Neck W Contrast    Result Date: 7/29/2020  EXAMINATION: CTA OF THE HEAD AND NECK WITH CONTRAST 7/29/2020 7:54 pm: TECHNIQUE: CTA of the head and neck was performed with the administration of intravenous contrast. Multiplanar reformatted images are provided for review. MIP images are provided for review. Stenosis of the internal carotid arteries measured using NASCET criteria. Dose modulation, iterative reconstruction, and/or weight based adjustment of the mA/kV was utilized to reduce the radiation dose to as low as reasonably achievable. COMPARISON: None. HISTORY: ORDERING SYSTEM PROVIDED HISTORY: vertigo on [facility] TECHNOLOGIST PROVIDED HISTORY: vertigo on [facility] Reason for Exam: vertigo Acuity: Acute Type of Exam: Initial FINDINGS: CTA NECK: AORTIC ARCH/ARCH VESSELS: No dissection or arterial injury. No significant stenosis of the brachiocephalic or subclavian arteries. CAROTID ARTERIES: No dissection, arterial injury, or hemodynamically significant stenosis by NASCET criteria. VERTEBRAL ARTERIES: No dissection, arterial injury, or significant stenosis. SOFT TISSUES: The lung apices are clear. No cervical or superior mediastinal lymphadenopathy. The larynx and pharynx are unremarkable. No acute abnormality of the salivary and thyroid glands. BONES: There is no acute fracture or suspect osseous lesion. There are mild degenerative changes in the cervical spine. CTA HEAD: ANTERIOR CIRCULATION: No significant stenosis of the intracranial internal carotid, anterior cerebral, or middle cerebral arteries. No aneurysm. POSTERIOR CIRCULATION: No significant stenosis of the vertebral, basilar, or posterior cerebral arteries. No aneurysm.   The posterior cerebral arteries have fetal origins. OTHER: No dural venous sinus thrombosis on this non-dedicated study. BRAIN: The patient's known tiny left frontal meningioma is not evident on the current study due to technique. No mass effect or midline shift. No extra-axial fluid collection. The gray-white differentiation is maintained. No acute arterial abnormality or hemodynamically significant arterial stenosis in the head or neck. RECENT VITALS:     Temp: 98.3 °F (36.8 °C),  Pulse: 62, Resp: 16, BP: 137/76, SpO2: 98 %      This patient is a 78 y.o. Female with central vertigo for last week and a half. On Eliquis for A. fib. CT is negative. Admitted to internal medicine for further evaluation and management. Neurology also consulted. ED Course as of Jul 30 0458   Wed Jul 29, 2020 2031 Patient has had one episode of dizziness so far. Sitting comfortably in bed. No acute distress. [CS]   2035 Pt observed walking to bathroom without difficulty    [CS]   2106 CT head, CTA head and neck are unremarkable. [CS]   2120 Developed a HA; will give reglan, benadryl, tylenol. Neuro wants IM as primary as she has HEMALATHA, CHF, and other medical comorbidities      [CS]   2127 Discussed case with Neuro, will likely admit for MRIs    [CS]   2157 Pt admitted to [facility]    [CS]      ED Course User Index  [CS] Chester Sharma DO       OUTSTANDING TASKS / RECOMMENDATIONS:    1. Awaiting transfer to floor     FINAL IMPRESSION:     1.  Vertigo        DISPOSITION:         DISPOSITION:  []  Discharge   []  Transfer -    [x]  Admission -  Internal medicine   []  Against Medical Advice   []  Eloped   FOLLOW-UP: Andrae Vitale MD  [address] [facility] [id number]  [address] [place]/ [address] [facility] [phone]           DISCHARGE MEDICATIONS: Current Discharge Medication List

## 2020-07-30 NOTE — FLOWSHEET NOTE
07/30/20 1509   Encounter Summary   Services provided to: Patient   Referral/Consult From: [facility] Family members   Continue Visiting   (07/30/2020)   Complexity of Encounter Low   Length of Encounter 30 minutes   Spiritual Assessment Completed Yes   Routine   Type Initial   Assessment Calm; Approachable;Passive   Intervention Active listening;Prayer;Sustaining presence/ Ministry of presence   Outcome Expressed gratitude;Comfort

## 2020-07-31 ENCOUNTER — APPOINTMENT (OUTPATIENT)
Dept: MRI IMAGING | Age: 70
DRG: 149 | End: 2020-07-31
Payer: MEDICARE

## 2020-07-31 LAB
GLUCOSE BLD-MCNC: 129 MG/DL (ref 65–105)
GLUCOSE BLD-MCNC: 137 MG/DL (ref 65–105)
GLUCOSE BLD-MCNC: 145 MG/DL (ref 65–105)
GLUCOSE BLD-MCNC: 166 MG/DL (ref 65–105)
LV EF: 52 %
LVEF MODALITY: NORMAL

## 2020-07-31 PROCEDURE — 93306 TTE W/DOPPLER COMPLETE: CPT

## 2020-07-31 PROCEDURE — 6370000000 HC RX 637 (ALT 250 FOR IP): Performed by: PSYCHIATRY & NEUROLOGY

## 2020-07-31 PROCEDURE — 6360000004 HC RX CONTRAST MEDICATION: Performed by: PODIATRIST

## 2020-07-31 PROCEDURE — 2580000003 HC RX 258: Performed by: NURSE PRACTITIONER

## 2020-07-31 PROCEDURE — 70553 MRI BRAIN STEM W/O & W/DYE: CPT

## 2020-07-31 PROCEDURE — 6370000000 HC RX 637 (ALT 250 FOR IP): Performed by: STUDENT IN AN ORGANIZED HEALTH CARE EDUCATION/TRAINING PROGRAM

## 2020-07-31 PROCEDURE — 96365 THER/PROPH/DIAG IV INF INIT: CPT

## 2020-07-31 PROCEDURE — 99232 SBSQ HOSP IP/OBS MODERATE 35: CPT | Performed by: PSYCHIATRY & NEUROLOGY

## 2020-07-31 PROCEDURE — 2580000003 HC RX 258: Performed by: STUDENT IN AN ORGANIZED HEALTH CARE EDUCATION/TRAINING PROGRAM

## 2020-07-31 PROCEDURE — A9576 INJ PROHANCE MULTIPACK: HCPCS | Performed by: PODIATRIST

## 2020-07-31 PROCEDURE — 82947 ASSAY GLUCOSE BLOOD QUANT: CPT

## 2020-07-31 PROCEDURE — 1200000000 HC SEMI PRIVATE

## 2020-07-31 PROCEDURE — 97116 GAIT TRAINING THERAPY: CPT

## 2020-07-31 PROCEDURE — 6370000000 HC RX 637 (ALT 250 FOR IP): Performed by: NURSE PRACTITIONER

## 2020-07-31 PROCEDURE — 6360000002 HC RX W HCPCS: Performed by: STUDENT IN AN ORGANIZED HEALTH CARE EDUCATION/TRAINING PROGRAM

## 2020-07-31 PROCEDURE — 99233 SBSQ HOSP IP/OBS HIGH 50: CPT | Performed by: INTERNAL MEDICINE

## 2020-07-31 PROCEDURE — 94660 CPAP INITIATION&MGMT: CPT

## 2020-07-31 RX ORDER — SODIUM CHLORIDE 0.9 % (FLUSH) 0.9 %
10 SYRINGE (ML) INJECTION PRN
Status: DISCONTINUED | OUTPATIENT
Start: 2020-07-31 | End: 2020-08-04 | Stop reason: HOSPADM

## 2020-07-31 RX ORDER — OXYMETAZOLINE HYDROCHLORIDE 0.05 G/100ML
2 SPRAY NASAL 2 TIMES DAILY
Status: DISPENSED | OUTPATIENT
Start: 2020-07-31 | End: 2020-08-02

## 2020-07-31 RX ADMIN — METOPROLOL TARTRATE 25 MG: 25 TABLET ORAL at 08:44

## 2020-07-31 RX ADMIN — APIXABAN 5 MG: 5 TABLET, FILM COATED ORAL at 08:44

## 2020-07-31 RX ADMIN — MECLIZINE HYDROCHLORIDE 25 MG: 12.5 TABLET, FILM COATED ORAL at 20:27

## 2020-07-31 RX ADMIN — APIXABAN 5 MG: 5 TABLET, FILM COATED ORAL at 20:27

## 2020-07-31 RX ADMIN — CEFTRIAXONE SODIUM 2 G: 2 INJECTION, POWDER, FOR SOLUTION INTRAMUSCULAR; INTRAVENOUS at 17:20

## 2020-07-31 RX ADMIN — INSULIN LISPRO 1 UNITS: 100 INJECTION, SOLUTION INTRAVENOUS; SUBCUTANEOUS at 13:12

## 2020-07-31 RX ADMIN — GADOTERIDOL 20 ML: 279.3 INJECTION, SOLUTION INTRAVENOUS at 12:12

## 2020-07-31 RX ADMIN — INSULIN LISPRO 1 UNITS: 100 INJECTION, SOLUTION INTRAVENOUS; SUBCUTANEOUS at 20:39

## 2020-07-31 RX ADMIN — MECLIZINE HYDROCHLORIDE 25 MG: 12.5 TABLET, FILM COATED ORAL at 14:40

## 2020-07-31 RX ADMIN — Medication 10 ML: at 20:28

## 2020-07-31 RX ADMIN — ATORVASTATIN CALCIUM 80 MG: 80 TABLET, FILM COATED ORAL at 20:28

## 2020-07-31 RX ADMIN — PREDNISONE 30 MG: 20 TABLET ORAL at 20:28

## 2020-07-31 RX ADMIN — FUROSEMIDE 20 MG: 20 TABLET ORAL at 08:44

## 2020-07-31 RX ADMIN — OXYBUTYNIN CHLORIDE 10 MG: 10 TABLET, EXTENDED RELEASE ORAL at 08:44

## 2020-07-31 RX ADMIN — MECLIZINE HYDROCHLORIDE 25 MG: 12.5 TABLET, FILM COATED ORAL at 08:44

## 2020-07-31 RX ADMIN — Medication 10 ML: at 08:44

## 2020-07-31 RX ADMIN — METOPROLOL TARTRATE 25 MG: 25 TABLET ORAL at 20:27

## 2020-07-31 RX ADMIN — PANTOPRAZOLE SODIUM 40 MG: 40 TABLET, DELAYED RELEASE ORAL at 06:27

## 2020-07-31 ASSESSMENT — PAIN SCALES - GENERAL
PAINLEVEL_OUTOF10: 4
PAINLEVEL_OUTOF10: 2

## 2020-07-31 NOTE — PROGRESS NOTES
Itz Feldman was evaluated today and a DME order was entered for a wheeled walker with seat because she requires this to successfully complete daily living tasks of ambulating. A wheeled walker with seat is necessary due to the patient's unsteady gait, upper body weakness, inability to  and ambulation device, ambulating only short distances by pushing a walker, and the need to sit for a short time before resuming ambulation. These tasks cannot be completed with a lesser ambulation device such as a cane, crutch, or standard walker. The need for this equipment was discussed with the patient and she understands and is in agreement.       Electronically signed by Dayna Stroud MD on 7/31/2020 at 12:04 PM

## 2020-07-31 NOTE — PROGRESS NOTES
over and has to hold onto the wall and then immediately needs to sit back down, however when she does stand she feels the room is spinning. There is h/o nausea and tinnitus but no vomiting. Patient feels weak. Patient states this has never happened before. Patient denies any syncopal episode. Patient tried over-the-counter motion sickness medication but did not get any relief. Patient has history of A. fib, on Eliquis. History of hypertension(on amlodipine and Lopressor), diastolic CHF(on Lasix) with ejection fraction more than 65%(ECHO ) . No history of recent viral URI symptoms, denies any head trauma, denied any previous history of vertigo. Patient has history of meningioma and goes yearly MRI to monitor for any growth. History of DM type II. Previous MI on Eliquis, atrial fibrillation. Last MRI appears to be in  showed a stable left frontal meningioma. Patient recently took antibiotics for right lower extremity Cellulitis. In ER chest Xray show Cardiomegaly. CT Head wo contrast: No acute intracranial findings. CTA Head Neck w contrast: No acute arterial abnormality. B sugar: 154, PTT 31.9. Amitriptyline and amlodipine were held. Orthostatics were negative. OBJECTIVE     Vital Signs:  /81   Pulse 73   Temp 97.5 °F (36.4 °C)   Resp 16   Ht 5' 9\" (1.753 m)   Wt (!) 301 lb (136.5 kg)   SpO2 100%   BMI 44.45 kg/m²     Temp (24hrs), Av.7 °F (36.5 °C), Min:97.3 °F (36.3 °C), Max:97.9 °F (36.6 °C)    No intake/output data recorded. Physical Exam:    Constitutional: This is a well developed, well nourished, Greater than 40 - Morbid Obesity / Extreme Obesity / Grade III 78y.o. year old female who is alert, oriented, cooperative and in no apparent distress. Head:normocephalic and atraumatic. EENT: Pt has hearing loss on left ear. Patient gets dizzy if turned to the right side. Neck: Supple without thyromegaly.    Respiratory: Chest was symmetrical without dullness to percussion. Cardiovascular: Regular without murmur, clicks, gallops or rubs. Abdomen: Slightly rounded and soft without organomegaly. Musculoskeletal: Normal curvature of the spine. Pt unable to raise her legs against gravity. Extremities:   lower extremity edema and tenderness present. no varicosities or erythema. Scar anabel b/l knee s/p knee replacement surgery (2016). Skin:  Warm and dry.     Neurological/Psychiatric: The patient's general behavior, level of consciousness, thought content and emotional status is normal.     Medications:  Scheduled Medications:    meclizine  25 mg Oral TID    [Held by provider] amitriptyline  50 mg Oral Nightly    [Held by provider] amLODIPine  10 mg Oral Daily    atorvastatin  80 mg Oral Nightly    apixaban  5 mg Oral BID    furosemide  20 mg Oral Daily    metoprolol tartrate  25 mg Oral BID    oxybutynin  10 mg Oral Daily    pantoprazole  40 mg Oral QAM AC    sodium chloride flush  10 mL Intravenous 2 times per day    insulin lispro  0-6 Units Subcutaneous TID WC    insulin lispro  0-3 Units Subcutaneous Nightly     Continuous Infusions:    dextrose       PRN Medicationssodium chloride flush, 10 mL, PRN  sodium chloride flush, 10 mL, PRN  potassium chloride, 40 mEq, PRN    Or  potassium alternative oral replacement, 40 mEq, PRN    Or  potassium chloride, 10 mEq, PRN  magnesium sulfate, 1 g, PRN  acetaminophen, 650 mg, Q6H PRN    Or  acetaminophen, 650 mg, Q6H PRN  polyethylene glycol, 17 g, Daily PRN  promethazine, 12.5 mg, Q6H PRN    Or  ondansetron, 4 mg, Q6H PRN  nicotine, 1 patch, Daily PRN  glucose, 15 g, PRN  dextrose, 12.5 g, PRN  glucagon (rDNA), 1 mg, PRN  dextrose, 100 mL/hr, PRN        Diagnostic Labs:  CBC:   Recent Labs     07/29/20  1657 07/30/20  0613   WBC 8.4 6.6   RBC 4.75 4.31   HGB 13.7 12.6   HCT 44.0 40.7   MCV 92.6 94.4   RDW 14.4 14.6*    182     BMP:   Recent Labs     07/29/20  1657 07/30/20  0613    139   K 4.1 4.2   CL 101 103   CO2 24 23   BUN 17 17   CREATININE 0.85 0.89         ASSESSMENT & PLAN      1. Vertigo: Possibly due to mastoditis, Pt on Rocephin and Antivert. Although could be supeimpossed Benign Paroxysmal Positional Vertigo, Past h/o Vasodepressive syndrome:   2. Paroxysmal A. Fib: Resume Eliquis chong 5 mg, Lopressor 25 mg twice daily. 3.  CAD s/p stent placement: Stent placed in 2016 and 2018. Patient not on aspirin or Plavix. Aspirin 81 mg started. Resumed Lopressor 25 mg, Lipitor 80 mg. Recommend ECHO. 4.  HEMALATHA: Patient uses BiPAP overnight. 5.  Type II DM: Blood sugar on admission was 154. Takes metformin at home. Continue low-dose sliding scale with hypoglycemia protocol. 6.  Essential hypertension: Started on home dose of Lopressor 25 mg twice daily, will hold Norvasc 10 mg and will monitor blood pressure.  DVT ppx: Eliquis  GI ppx: Protonix     PT/OT/SW: Working  Discharge Planning: As per  recommendation.       Joni Martinez MD  Internal Medicine Resident, PGY-1  [facility]; [place], [place]  7/31/2020, 12:39 PM      Attending Physician Statement  I have discussed the case, including pertinent history and exam findings with the resident and the team.  I have seen and examined the patient and the key elements of the encounter have been performed by me. I agree with the assessment, plan and orders as documented by the resident.       Mbonu Glady Ganser, MD   Attending Physician, Internal Medicine Residency Program  7/31/2020, 6:38 PM

## 2020-07-31 NOTE — PROGRESS NOTES
Physical Therapy  Facility/Department: [facility] RENAL//MED SURG  Daily Treatment Note  NAME: Gio Harris  : 1950  MRN: [id number]    Date of Service: 2020    Discharge Recommendations:  Patient would benefit from continued therapy after discharge   PT Equipment Recommendations  Equipment Needed: Yes  Mobility Devices: Jeremy Poisson: Rollator (4 Wheeled)  Other: Pt would benefit from Rollator d/t dizziness and fatigue, pt reporting difficulty ambulating to her apartment from the car at baseline, pt reports she normally takes seated rest breaks but the apartment complex removed the chairs in the hallway d/t the Covid 19 virus    Assessment   Body structures, Functions, Activity limitations: Decreased functional mobility ; Decreased endurance;Decreased strength;Decreased balance  Assessment: The pt ambulated 30 ft x2 with a RW SBA, She fatigues quickly with dizziness during mobilization limiting distance. She could benefit from a continuation of PT following her DC  Prognosis: Good  Decision Making: Medium Complexity  PT Education: PT Role;Gait Training;Energy Conservation  Patient Education: Importance of PT and benefits and continued mobility  REQUIRES PT FOLLOW UP: Yes  Activity Tolerance  Activity Tolerance: Patient limited by fatigue;Patient limited by endurance;Treatment limited secondary to medical complications (free text)  Activity Tolerance: dizziness     Patient Diagnosis(es): The encounter diagnosis was Vertigo.      has a past medical history of HOSSEIN (acute kidney injury) ([address] Arthritis, Chronic diastolic congestive heart failure ([address] Chronic kidney disease, Coronary artery disease involving native coronary artery of native heart without angina pectoris, Epistaxis, GERD (gastroesophageal reflux disease), Headache, Hyperlipidemia, Hypertension, MI (myocardial infarction) ([address] Neuromuscular disorder ([address] Osteoarthritis, Patient in clinical research study, Sleep apnea, Snores, Type II or unspecified type diabetes mellitus without mention of complication, not stated as uncontrolled, Use of cane as ambulatory aid, and Wears contact lenses. has a past surgical history that includes Hysterectomy (1979); Knee arthroscopy (Left); Total knee arthroplasty (Right, 07/21/2016); Coronary angioplasty with stent (2015); Total knee arthroplasty (Left, 03/2016); CNTRL NOSE BLEED LIGATION & NASAL ENDO (Right, 04/08/2019); Nasal sinus surgery (Right, 4/8/2019); and Turbinoplasties (Right, 4/8/2019). Restrictions  Restrictions/Precautions  Restrictions/Precautions: General Precautions, Fall Risk  Required Braces or Orthoses?: No  Required Braces or Orthoses  Other: Abdominal Binder  Position Activity Restriction  Sternal Precautions: 10# Lifting Restrictions  Other position/activity restrictions: Up with assist  Subjective   General  Response To Previous Treatment: Patient with no complaints from previous session. Family / Caregiver Present: No  Subjective  Subjective: RN and pt agreeable to PT, pt awake standing next to her bed with RN present upon arrival, Pt reluctant to participate but agreeable with encouragment  Pain Screening  Patient Currently in Pain: Denies  Vital Signs  Patient Currently in Pain: Denies       Orientation  Orientation  Overall Orientation Status: Within Normal Limits       Objective      Transfers  Sit to Stand: Stand by assistance  Stand to sit: Stand by assistance  Ambulation  Ambulation?: Yes  Ambulation 1  Surface: level tile  Device: Rolling Walker  Assistance: Stand by assistance  Gait Deviations: Increased CEE; Slow Ella;Decreased step length;Decreased step height  Distance: 30ft x 2  Comments: Seated rest break needed d/t fatigue and dizziness  Stairs/Curb  Stairs?: No     Balance  Posture: Good  Sitting - Static: Good  Sitting - Dynamic: Fair  Standing - Static: Fair;+  Standing - Dynamic: Fair;-  Comments: pt sat EOB SBA  Exercises  Comments: Pt leaving with transport Goals  Short term goals  Time Frame for Short term goals: 10 visits  Short term goal 1: transfers with SBA  Short term goal 2: amb 150 ft with a RW x SBA  Short term goal 3: ascend/descend 4 steps with SBA  Short term goal 4: exercise program x SBA  Patient Goals   Patient goals : [facility] Ortega home    Plan    Plan  Times per week: 5-6x wk  Current Treatment Recommendations: Strengthening, Gait Training, Stair training, Balance Training, Functional Mobility Training, Endurance Training, Safety Education & Training  Safety Devices  Type of devices: Left in chair(Nurse present upon exiting room, RN removing Cardiac monitor and pt leaving with transport)     Therapy Time   Individual Concurrent Group Co-treatment   Time In 1036         Time Out 1051         Minutes 15          Time coded minutes ISAURO Arceo

## 2020-07-31 NOTE — PROGRESS NOTES
Physician Progress Note      PATIENT:               Ashley Espinal  CSN #:                  [id number]  :                       1950  ADMIT DATE:       2020 4:33 PM  [address] [facility] DATE:  RESPONDING  PROVIDER #:        Eloina Huynh MD          QUERY TEXT:    Pt admitted with dizziness. Noted documentation of benign positional vertigo   on  by ordered Neurology consultant. If possible, please document in   progress notes and discharge summary:    The medical record reflects the following:  Risk Factors: Hx vasodepressive syndrome  Clinical Indicators: CTH negative for acute process, CTA H/N no large vessel   occlusion, c/o room spinning/dizziness x 1 week. Neuro consult note:   \"Impression Dizziness . Probable benign positional vertigo\"  Treatment: Neuro consult, CTH, MRI pending. Antivert, planned OP vestibular   rehab    Thank-you,  Brooke Schwab RN, CDS  Please Epic inbox/Perfect Serve for questions. Options provided:  -- Benign positional vertigo  -- Otitis media, no benign positional vertigo  -- Other - I will add my own diagnosis  -- Disagree - Clinically unable to determine / Unknown  -- Refer to Clinical Documentation Reviewer    PROVIDER RESPONSE TEXT:    The diagnosis of benign positional vertigo was confirmed.     Query created by: Florencio Barth on 2020 7:34 AM      Electronically signed by:  Eloina Huynh MD 2020 12:17 PM

## 2020-07-31 NOTE — CONSULTS
[facility] Neurology   IN-PATIENT SERVICE      NEUROLOGY CONSULT PROGRESS  NOTE            Date:   7/31/2020  Patient name:  Annamarie Salomon  Date of admission:  7/29/2020  YOB: 1950      Interval History:   Annamarie Salomon is a  78 y.o. female admitted on 7/29/2020 with Dizziness [R42]  Dizziness [R42]  Vertigo [R42]. This is a follow-up neurology progress note. The patient was seen and examined and the chart was reviewed. Over night: No any acute event. Patient denies any complaints. Patient feels still dizzy when changing position. ent       History of Present Illness:         77 y/o f h/o meningioma, chronic daily headaches, T2DM, HEMALATHA, HTN,  AF on eliquis, diastolic CHF presents with 3 day history of progressively worsening dizziness. The dizziness is like the room is spinning, and lasted initially for 1 minute at a time, now is about 3 minutes at a time. It is brought on  by standing, and she feels it goes away after she lays flat for a while. It is associated with nausea. She also had about 3 episodes of dramatic non-pulsatile tinnitus that lasted a brief time then stopped. When she stands and walks, she has to hold onto things, and close her eyes. Her legs also feel very heavy, and she feels short of breath with walking- these are new for her. Not assoc with sharp head movements. It is associated with a moderate HA, slightly worse than her daily headache. She feels this HA started 1 week ago when she got new glasses. No phono/photophobia or vomiting. No chest pain. No ear pain. She has not tried any medication for the dizziness. She reports having dizziness in the past assoc with HTN, but that she has had normal BP, and that this is different in that it is more severe. She's a patient of Dr. Sai Winn for migraine and HEMALATHA     On exam, she keeps her eyes mostly closed, and is holding her head in pain.  She appears to have a left ruptured ear drum and erythematous surrounding IAC. HINTS with no nystagmus, a few beats b/l nystagmus on head impulse, and + skew on R eye. Admittted to IM for vertigo.         Past Medical History:     Past Medical History:   Diagnosis Date    HOSSEIN (acute kidney injury) ([facility] [address] 3/3/2017    Arthritis     shoulders    Chronic diastolic congestive heart failure ([facility] [address] 3/3/2017    Chronic kidney disease     Coronary artery disease involving native coronary artery of native heart without angina pectoris 10/13/2016    Epistaxis since Jan. 17 2019    GERD (gastroesophageal reflux disease)     Headache     Hyperlipidemia     Hypertension     MI (myocardial infarction) ([facility] [address] 2015    Dr. Cosme Arguelles Neuromuscular disorder [facility])     Osteoarthritis     Patient in clinical research study 03/06/2017    AbsorbIV    Sleep apnea     CPAP    Snores     Type II or unspecified type diabetes mellitus without mention of complication, not stated as uncontrolled     Use of cane as ambulatory aid     Wears contact lenses     LEFT EYE ONLY        Past Surgical History:     Past Surgical History:   Procedure Laterality Date    CONTROL OF NOSE BLEED LIGATION & NASAL ENDOSCOPY Right 04/08/2019    turbinoplasty    CORONARY ANGIOPLASTY WITH STENT PLACEMENT  2015    2 stents/ Dr. Rebeca Hodgkins, [facility] ARTHROSCOPY Left     NASAL SINUS SURGERY Right 4/8/2019    NASAL ENDOSCOPIC, NASAL CAUTERY performed by Tino Forrester MD at [address] [place] [address] Right 07/21/2016    TOTAL KNEE ARTHROPLASTY Left 03/2016    TURBINOPLASTIES Right 4/8/2019    TURBINOPLASTY performed by Tino Forrester MD at [place] [address]        Medications during admission:      meclizine  25 mg Oral TID    [Held by provider] amitriptyline  50 mg Oral Nightly    [Held by provider] amLODIPine  10 mg Oral Daily    atorvastatin  80 mg Oral Nightly    apixaban  5 mg Oral BID    furosemide  20 mg Oral Daily    metoprolol tartrate  25 mg function Intact to touch, pin, vibration, proprioception throughout     Cerebellar Intact finger-nose-finger testing. Intact heel-shin testing. No dysdiadochokinesia present. No tremors                        Reflex function 2/4 symmetric throughout . Downgoing plantar response bilaterally.  (-)Tierney's sign bilaterally      Gait                  Not tested              Diagnostics:      Laboratory Testing:  CBC:   Recent Labs     07/29/20  1657 07/30/20  0613   WBC 8.4 6.6   HGB 13.7 12.6    182       BMP:    Recent Labs     07/29/20  1657 07/30/20  0613    139   K 4.1 4.2    103   CO2 24 23   BUN 17 17   CREATININE 0.85 0.89   GLUCOSE 101* 161*         Lab Results   Component Value Date    CHOL 130 02/20/2020    LDLCHOLESTEROL 41 02/20/2020    HDL 56 02/20/2020    TRIG 163 (H) 02/20/2020    ALT 15 11/14/2019    AST 23 11/14/2019    TSH 0.76 02/20/2020    INR 1.0 07/30/2020    LABA1C 7.7 (H) 07/30/2020    LABMICR CANNOT BE CALCULATED 03/20/2019         No results found for: PHENYTOIN, PHENYTOIN, VALPROATE, CBMZ        Imaging/Diagnostics:           CT head : no acute abnormality       CTA head and neck : No LVO or stenosis       MRI Brain : NO acute infarct       2D ECHO: EF  52 %       Impression:         Vertigo   Migraine    Meningioma           Plan:      Antivert 25 mf TID    PT/OT   Out patient Vestibular rehabilitation    Rest of the management as per primary         Patient was staffed with the attending Dr. Vidhi Rondon    Electronically signed by Sima Noguera MD on 7/31/2020 at 1:16 PM      Clemente Prasad MD  PGY-2 Neurology Resident   Neurology [facility]

## 2020-07-31 NOTE — CARE COORDINATION
TRANSITIONAL CARE PLANNING/ 2 Rehab [facility] Day: 2    Reason for Admission: Dizziness [R42]  Dizziness [R42]  Vertigo [R42]     Treatment Plan of Care: PT/OT  Antivert 25 mf TID     Tests/Procedures still needed: ECHO, MRI today  CT head : no acute abnormality     CTA head and neck : No LVO or    stenosis     MRI Brain : NO acute infarct     2D ECHO: EF  52 %     Barriers to Discharge:     Readmission Risk              Risk of Unplanned Readmission:        13       Patient goals/Treatment, neurology plan for OP Vestibular Rehab   Preferences/Transitional Plan:     Referrals Made:  DME order for Rollator sent to [facility]  Spoke to Lara Levin with [facility],  they will deliver today.      Follow Up needed:

## 2020-07-31 NOTE — PLAN OF CARE
Problem: Falls - Risk of:  Goal: Will remain free from falls  Description: Will remain free from falls  Outcome: Met This Shift  Goal: Absence of physical injury  Description: Absence of physical injury  Outcome: Met This Shift     Problem: Pain:  Goal: Pain level will decrease  Description: Pain level will decrease  Outcome: Ongoing  Goal: Control of acute pain  Description: Control of acute pain  Outcome: Ongoing  Goal: Control of chronic pain  Description: Control of chronic pain  Outcome: Ongoing     Problem: Skin Integrity:  Goal: Will show no infection signs and symptoms  Description: Will show no infection signs and symptoms  Outcome: Ongoing  Goal: Absence of new skin breakdown  Description: Absence of new skin breakdown  Outcome: Ongoing     Problem: Musculor/Skeletal Functional Status  Goal: Highest potential functional level  Outcome: Ongoing

## 2020-07-31 NOTE — PLAN OF CARE
Problem: Pain:  Goal: Pain level will decrease  Description: Pain level will decrease  7/31/2020 1525 by Roby Mitchell RN  Outcome: Ongoing  7/31/2020 0519 by Gonzalo Wallace RN  Outcome: Ongoing  Goal: Control of acute pain  Description: Control of acute pain  7/31/2020 1525 by Roby Mitchell RN  Outcome: Ongoing  7/31/2020 0519 by Gonzalo Wallace RN  Outcome: Ongoing  Goal: Control of chronic pain  Description: Control of chronic pain  7/31/2020 1525 by Roby Mitchell RN  Outcome: Ongoing  7/31/2020 0519 by Gonzalo Wallaec RN  Outcome: Ongoing     Problem: Skin Integrity:  Goal: Will show no infection signs and symptoms  Description: Will show no infection signs and symptoms  7/31/2020 1525 by Roby Mitchell RN  Outcome: Ongoing  7/31/2020 0519 by Gonzalo Wallace RN  Outcome: Ongoing  Goal: Absence of new skin breakdown  Description: Absence of new skin breakdown  7/31/2020 1525 by Roby Mitchell RN  Outcome: Ongoing  7/31/2020 0519 by Gonzalo Wallace RN  Outcome: Ongoing     Problem: Falls - Risk of:  Goal: Will remain free from falls  Description: Will remain free from falls  7/31/2020 1525 by Roby Mitchell RN  Outcome: Ongoing  7/31/2020 0519 by Gonzalo Wallace RN  Outcome: Met This Shift  Goal: Absence of physical injury  Description: Absence of physical injury  7/31/2020 1525 by Roby Mitchell RN  Outcome: Ongoing  7/31/2020 0519 by Gonzalo Wallace RN  Outcome: Met This Shift     Problem: Musculor/Skeletal Functional Status  Goal: Highest potential functional level  7/31/2020 1525 by Roby Mitchell RN  Outcome: Ongoing  7/31/2020 0519 by Gonzalo Wallace RN  Outcome: Ongoing

## 2020-08-01 PROBLEM — H70.002 ACUTE MASTOIDITIS OF LEFT SIDE: Status: ACTIVE | Noted: 2020-08-01

## 2020-08-01 LAB
ABSOLUTE EOS #: <0.03 K/UL (ref 0–0.44)
ABSOLUTE IMMATURE GRANULOCYTE: <0.03 K/UL (ref 0–0.3)
ABSOLUTE LYMPH #: 1.16 K/UL (ref 1.1–3.7)
ABSOLUTE MONO #: 0.23 K/UL (ref 0.1–1.2)
ANION GAP SERPL CALCULATED.3IONS-SCNC: 13 MMOL/L (ref 9–17)
BASOPHILS # BLD: 0 % (ref 0–2)
BASOPHILS ABSOLUTE: <0.03 K/UL (ref 0–0.2)
BUN BLDV-MCNC: 18 MG/DL (ref 8–23)
BUN/CREAT BLD: ABNORMAL (ref 9–20)
CALCIUM SERPL-MCNC: 9.4 MG/DL (ref 8.6–10.4)
CHLORIDE BLD-SCNC: 100 MMOL/L (ref 98–107)
CO2: 25 MMOL/L (ref 20–31)
CREAT SERPL-MCNC: 0.85 MG/DL (ref 0.5–0.9)
DIFFERENTIAL TYPE: ABNORMAL
EOSINOPHILS RELATIVE PERCENT: 0 % (ref 1–4)
GFR AFRICAN AMERICAN: >60 ML/MIN
GFR NON-AFRICAN AMERICAN: >60 ML/MIN
GFR SERPL CREATININE-BSD FRML MDRD: ABNORMAL ML/MIN/{1.73_M2}
GFR SERPL CREATININE-BSD FRML MDRD: ABNORMAL ML/MIN/{1.73_M2}
GLUCOSE BLD-MCNC: 101 MG/DL (ref 65–105)
GLUCOSE BLD-MCNC: 165 MG/DL (ref 65–105)
GLUCOSE BLD-MCNC: 174 MG/DL (ref 70–99)
GLUCOSE BLD-MCNC: 182 MG/DL (ref 65–105)
GLUCOSE BLD-MCNC: 246 MG/DL (ref 65–105)
HCT VFR BLD CALC: 43.7 % (ref 36.3–47.1)
HEMOGLOBIN: 13.7 G/DL (ref 11.9–15.1)
IMMATURE GRANULOCYTES: 0 %
LYMPHOCYTES # BLD: 17 % (ref 24–43)
MCH RBC QN AUTO: 28.5 PG (ref 25.2–33.5)
MCHC RBC AUTO-ENTMCNC: 31.4 G/DL (ref 28.4–34.8)
MCV RBC AUTO: 91 FL (ref 82.6–102.9)
MONOCYTES # BLD: 3 % (ref 3–12)
NRBC AUTOMATED: 0 PER 100 WBC
PDW BLD-RTO: 14.2 % (ref 11.8–14.4)
PLATELET # BLD: 198 K/UL (ref 138–453)
PLATELET ESTIMATE: ABNORMAL
PMV BLD AUTO: 10.1 FL (ref 8.1–13.5)
POTASSIUM SERPL-SCNC: 4.1 MMOL/L (ref 3.7–5.3)
RBC # BLD: 4.8 M/UL (ref 3.95–5.11)
RBC # BLD: ABNORMAL 10*6/UL
SEG NEUTROPHILS: 80 % (ref 36–65)
SEGMENTED NEUTROPHILS ABSOLUTE COUNT: 5.56 K/UL (ref 1.5–8.1)
SODIUM BLD-SCNC: 138 MMOL/L (ref 135–144)
WBC # BLD: 7 K/UL (ref 3.5–11.3)
WBC # BLD: ABNORMAL 10*3/UL

## 2020-08-01 PROCEDURE — 1200000000 HC SEMI PRIVATE

## 2020-08-01 PROCEDURE — 96366 THER/PROPH/DIAG IV INF ADDON: CPT

## 2020-08-01 PROCEDURE — 6370000000 HC RX 637 (ALT 250 FOR IP): Performed by: NURSE PRACTITIONER

## 2020-08-01 PROCEDURE — 99232 SBSQ HOSP IP/OBS MODERATE 35: CPT | Performed by: PSYCHIATRY & NEUROLOGY

## 2020-08-01 PROCEDURE — 6370000000 HC RX 637 (ALT 250 FOR IP): Performed by: STUDENT IN AN ORGANIZED HEALTH CARE EDUCATION/TRAINING PROGRAM

## 2020-08-01 PROCEDURE — 80048 BASIC METABOLIC PNL TOTAL CA: CPT

## 2020-08-01 PROCEDURE — 6370000000 HC RX 637 (ALT 250 FOR IP): Performed by: PSYCHIATRY & NEUROLOGY

## 2020-08-01 PROCEDURE — 85025 COMPLETE CBC W/AUTO DIFF WBC: CPT

## 2020-08-01 PROCEDURE — 6360000002 HC RX W HCPCS: Performed by: STUDENT IN AN ORGANIZED HEALTH CARE EDUCATION/TRAINING PROGRAM

## 2020-08-01 PROCEDURE — 2580000003 HC RX 258: Performed by: NURSE PRACTITIONER

## 2020-08-01 PROCEDURE — 36415 COLL VENOUS BLD VENIPUNCTURE: CPT

## 2020-08-01 PROCEDURE — 2580000003 HC RX 258: Performed by: STUDENT IN AN ORGANIZED HEALTH CARE EDUCATION/TRAINING PROGRAM

## 2020-08-01 PROCEDURE — 99233 SBSQ HOSP IP/OBS HIGH 50: CPT | Performed by: INTERNAL MEDICINE

## 2020-08-01 PROCEDURE — 82947 ASSAY GLUCOSE BLOOD QUANT: CPT

## 2020-08-01 RX ADMIN — ATORVASTATIN CALCIUM 80 MG: 80 TABLET, FILM COATED ORAL at 21:22

## 2020-08-01 RX ADMIN — MECLIZINE HYDROCHLORIDE 25 MG: 12.5 TABLET, FILM COATED ORAL at 21:22

## 2020-08-01 RX ADMIN — APIXABAN 5 MG: 5 TABLET, FILM COATED ORAL at 08:44

## 2020-08-01 RX ADMIN — MECLIZINE HYDROCHLORIDE 25 MG: 12.5 TABLET, FILM COATED ORAL at 16:00

## 2020-08-01 RX ADMIN — OXYBUTYNIN CHLORIDE 10 MG: 10 TABLET, EXTENDED RELEASE ORAL at 08:44

## 2020-08-01 RX ADMIN — PREDNISONE 30 MG: 20 TABLET ORAL at 21:21

## 2020-08-01 RX ADMIN — CEFTRIAXONE SODIUM 2 G: 2 INJECTION, POWDER, FOR SOLUTION INTRAMUSCULAR; INTRAVENOUS at 17:28

## 2020-08-01 RX ADMIN — PREDNISONE 30 MG: 20 TABLET ORAL at 08:45

## 2020-08-01 RX ADMIN — MECLIZINE HYDROCHLORIDE 25 MG: 12.5 TABLET, FILM COATED ORAL at 08:44

## 2020-08-01 RX ADMIN — INSULIN LISPRO 2 UNITS: 100 INJECTION, SOLUTION INTRAVENOUS; SUBCUTANEOUS at 17:28

## 2020-08-01 RX ADMIN — Medication 10 ML: at 08:46

## 2020-08-01 RX ADMIN — METOPROLOL TARTRATE 25 MG: 25 TABLET ORAL at 08:44

## 2020-08-01 RX ADMIN — FUROSEMIDE 20 MG: 20 TABLET ORAL at 08:44

## 2020-08-01 RX ADMIN — APIXABAN 5 MG: 5 TABLET, FILM COATED ORAL at 21:22

## 2020-08-01 RX ADMIN — Medication 10 ML: at 21:22

## 2020-08-01 RX ADMIN — INSULIN LISPRO 1 UNITS: 100 INJECTION, SOLUTION INTRAVENOUS; SUBCUTANEOUS at 08:47

## 2020-08-01 RX ADMIN — PANTOPRAZOLE SODIUM 40 MG: 40 TABLET, DELAYED RELEASE ORAL at 06:06

## 2020-08-01 RX ADMIN — CEFTRIAXONE SODIUM 2 G: 2 INJECTION, POWDER, FOR SOLUTION INTRAMUSCULAR; INTRAVENOUS at 05:07

## 2020-08-01 RX ADMIN — METOPROLOL TARTRATE 25 MG: 25 TABLET ORAL at 21:22

## 2020-08-01 RX ADMIN — Medication 2 SPRAY: at 21:22

## 2020-08-01 RX ADMIN — INSULIN LISPRO 1 UNITS: 100 INJECTION, SOLUTION INTRAVENOUS; SUBCUTANEOUS at 21:22

## 2020-08-01 ASSESSMENT — PAIN SCALES - GENERAL: PAINLEVEL_OUTOF10: 0

## 2020-08-01 NOTE — PROGRESS NOTES
[facility]  Internal Medicine Teaching Residency Program  Inpatient Daily Progress Note  ______________________________________________________________________________    Patient: Nayely Pineda  YOB: 1950   [facility]:[id number]    Acct: [de-identified]     Room: [facility]2/0322-02  Admit date: 7/29/2020  Today's date: 08/01/20  Number of days in the hospital: 3    SUBJECTIVE     Admitting Diagnosis:   CC: Dizziness    Pt examined at bedside. Chart & results reviewed. Patient mentioned that she continues to have dizziness however it has decreased from yesterday. No falls overnight. Denies fever, ear pain or discharge. Afebrile hemodynamically stable    Saturating well on room air    Echo reviewed EF 52%. Grade 1 diastolic dysfunction. Monitor rate concentric LVH. MRI brain: Left mastoid effusion and small meningioma left frontal convexity 5 x 10 mm. No acute intracranial abnormality. Labs reviewed    BRIEF HISTORY         69 y.o. female presented to the ER for evaluation of dizziness and nausea for last 3 days  as recommended by Dr. Mac De Los Santos her office phone visit. Patient states that she is not able to get out of her bed for the last 3 days. Luann Ferrara has a spinning sensation when she tries to get out of bed.  Patient describes dizziness as room spinning, she states that when she gets up she nearly falls over and has to hold onto the wall and then immediately needs to sit back down, however when she does stand she feels the room is spinning. [place] is h/o nausea and tinnitus but no vomiting.  Patient feels weak. Patient states this has never happened before. Rizwana Oconnor denies any syncopal episode. Patient tried over-the-counter motion sickness medication but did not get any relief.   Patient has history of A. fib, on Eliquis.  History of hypertension(on amlodipine and Lopressor), diastolic CHF(on Lasix) with ejection fraction more than 65%(ECHO 2/19) .  No history of recent viral URI symptoms, denies any head trauma, denied any previous history of vertigo.  Patient has history of meningioma and goes yearly MRI to monitor for any growth.  History of DM type II.  Previous MI on Eliquis, atrial fibrillation. Last MRI appears to be in 2017 showed a stable left frontal meningioma.  Patient recently took antibiotics for right lower extremity Cellulitis. In ER chest Xray show Cardiomegaly. CT Head wo contrast: No acute intracranial findings. CTA Head Neck w contrast: No acute arterial abnormality. B sugar: 154, PTT 31.9. Amitriptyline and amlodipine were held. Orthostatics were negative. OBJECTIVE     Vital Signs:  /83   Pulse 79   Temp 97.7 °F (36.5 °C) (Oral)   Resp 18   Ht 5' 9\" (1.753 m)   Wt (!) 310 lb 12.8 oz (141 kg)   SpO2 99%   BMI 45.90 kg/m²     Temp (24hrs), Av.1 °F (36.7 °C), Min:97.7 °F (36.5 °C), Max:98.4 °F (36.9 °C)    No intake/output data recorded. Physical Exam:  Constitutional: This is a well developed, well nourished, Greater than 40 - Morbid Obesity / Extreme Obesity / Grade III 78y.o. year old female who is alert, oriented, cooperative and in no apparent distress. Head:normocephalic and atraumatic. EENT: Left mastoid area is tender  Neck: Supple without thyromegaly. No elevated JVP. Trachea was midline. Respiratory: Chest was symmetrical without dullness to percussion. Breath sounds bilaterally were clear to auscultation. Cardiovascular: Regular without murmur, clicks, gallops or rubs. Abdomen: Slightly rounded and soft without organomegaly. No rebound, rigidity or guarding was appreciated.     Neurological/Psychiatric: The patient's general behavior, level of consciousness, thought content and emotional status is normal.        Medications:  Scheduled Medications:    cefTRIAXone (ROCEPHIN) IV  2 g Intravenous Q12H    oxymetazoline  2 spray Each Nostril BID    predniSONE  30 mg Oral BID    meclizine 25 mg Oral TID    [Held by provider] amitriptyline  50 mg Oral Nightly    [Held by provider] amLODIPine  10 mg Oral Daily    atorvastatin  80 mg Oral Nightly    apixaban  5 mg Oral BID    furosemide  20 mg Oral Daily    metoprolol tartrate  25 mg Oral BID    oxybutynin  10 mg Oral Daily    pantoprazole  40 mg Oral QAM AC    sodium chloride flush  10 mL Intravenous 2 times per day    insulin lispro  0-6 Units Subcutaneous TID WC    insulin lispro  0-3 Units Subcutaneous Nightly     Continuous Infusions:    dextrose       PRN Medicationssodium chloride flush, 10 mL, PRN  sodium chloride flush, 10 mL, PRN  potassium chloride, 40 mEq, PRN    Or  potassium alternative oral replacement, 40 mEq, PRN    Or  potassium chloride, 10 mEq, PRN  magnesium sulfate, 1 g, PRN  acetaminophen, 650 mg, Q6H PRN    Or  acetaminophen, 650 mg, Q6H PRN  polyethylene glycol, 17 g, Daily PRN  promethazine, 12.5 mg, Q6H PRN    Or  ondansetron, 4 mg, Q6H PRN  nicotine, 1 patch, Daily PRN  glucose, 15 g, PRN  dextrose, 12.5 g, PRN  glucagon (rDNA), 1 mg, PRN  dextrose, 100 mL/hr, PRN        Diagnostic Labs:  CBC:   Recent Labs     07/29/20  1657 07/30/20  0613 08/01/20  0834   WBC 8.4 6.6 7.0   RBC 4.75 4.31 4.80   HGB 13.7 12.6 13.7   HCT 44.0 40.7 43.7   MCV 92.6 94.4 91.0   RDW 14.4 14.6* 14.2    182 198     BMP:   Recent Labs     07/29/20  1657 07/30/20  0613 08/01/20  0834    139 138   K 4.1 4.2 4.1    103 100   CO2 24 23 25   BUN 17 17 18   CREATININE 0.85 0.89 0.85        ASSESSMENT & PLAN   1. Vertigo likely secondary to acute mastoiditis: On Rocephin day 2. Prednisone 30 mg twice daily. Afrin nasal spray. Antivert 25 mg thrice daily. ENT consulted. Amitriptyline  held. Neurology recommends outpatient vestibular rehab  2. Chronic diastolic CHF. Continue Lopressor 25 mg BID and Lasix 20 mg daily  3. Paroxysmal A. Fib: Rate controlled. Eliquis 5 mg twice daily. Lopressor 25 mg twice daily.   4. Type 2

## 2020-08-01 NOTE — PROGRESS NOTES
1 tablet by mouth daily 30 tablet 3    nystatin (MYCOSTATIN) 199591 UNIT/GM powder Apply 3 times daily. 60 g 5    Lancets MISC 1 each by Does not apply route daily Use 3 times daily Diagnisis:250.0  Diabetes Mellitus Non-Insulin Dependent 100 each 11    nitroGLYCERIN (NITROSTAT) 0.4 MG SL tablet Place 1 tablet under the tongue every 5 minutes as needed for Chest pain up to max of 3 total doses. If no relief after 1 dose, call 911. 25 tablet 3    magnesium hydroxide (MILK OF MAGNESIA) 400 MG/5ML suspension Take 5 mLs by mouth daily as needed for Constipation         Allergies: Tr Monzon is allergic to lisinopril; pcn [penicillins]; fish-derived products; and losartan.     Past Medical History:   Diagnosis Date    HOSSEIN (acute kidney injury) ([facility] [address] 3/3/2017    Arthritis     shoulders    Chronic diastolic congestive heart failure ([facility] [address] 3/3/2017    Chronic kidney disease     Coronary artery disease involving native coronary artery of native heart without angina pectoris 10/13/2016    Epistaxis since Jan. 17 2019    GERD (gastroesophageal reflux disease)     Headache     Hyperlipidemia     Hypertension     MI (myocardial infarction) ([facility] [address] 2015    Dr. Malone [facility] Dr. Rachael Schrader Neuromuscular disorder [facility])     Osteoarthritis     Patient in clinical research study 03/06/2017    AbsorbIV    Sleep apnea     CPAP    Snores     Type II or unspecified type diabetes mellitus without mention of complication, not stated as uncontrolled     Use of cane as ambulatory aid     Wears contact lenses     LEFT EYE ONLY       Past Surgical History:   Procedure Laterality Date    CONTROL OF NOSE BLEED LIGATION & NASAL ENDOSCOPY Right 04/08/2019    turbinoplasty    CORONARY ANGIOPLASTY WITH STENT PLACEMENT  2015    2 stents/ Dr. Hayden Ngo, [address] [place] [address] ARTHROSCOPY Left     NASAL SINUS SURGERY Right 4/8/2019    NASAL ENDOSCOPIC, NASAL CAUTERY performed by Liv Quiros MD at [address] [place] [address] TOTAL KNEE ARTHROPLASTY Right 07/21/2016    TOTAL KNEE ARTHROPLASTY Left 03/2016    TURBINOPLASTIES Right 4/8/2019    TURBINOPLASTY performed by Nette Whaley MD at [place] [address]           Medications:     cefTRIAXone (ROCEPHIN) IV  2 g Intravenous Q12H    oxymetazoline  2 spray Each Nostril BID    predniSONE  30 mg Oral BID    meclizine  25 mg Oral TID    [Held by provider] amitriptyline  50 mg Oral Nightly    amLODIPine  10 mg Oral Daily    atorvastatin  80 mg Oral Nightly    apixaban  5 mg Oral BID    furosemide  20 mg Oral Daily    metoprolol tartrate  25 mg Oral BID    oxybutynin  10 mg Oral Daily    pantoprazole  40 mg Oral QAM AC    sodium chloride flush  10 mL Intravenous 2 times per day    insulin lispro  0-6 Units Subcutaneous TID WC    insulin lispro  0-3 Units Subcutaneous Nightly     PRN Meds include: sodium chloride flush, sodium chloride flush, potassium chloride **OR** potassium alternative oral replacement **OR** potassium chloride, magnesium sulfate, acetaminophen **OR** acetaminophen, polyethylene glycol, promethazine **OR** ondansetron, nicotine, glucose, dextrose, glucagon (rDNA), dextrose    Objective:   /83   Pulse 79   Temp 97.7 °F (36.5 °C) (Oral)   Resp 18   Ht 5' 9\" (1.753 m)   Wt (!) 310 lb 12.8 oz (141 kg)   SpO2 99%   BMI 45.90 kg/m²     Blood pressure range: Systolic (23BVO), UPB:799 , Min:132 , XZL:611   ; Diastolic (10BAB), PPW:49, Min:79, Max:83        NEUROLOGIC EXAMINATION  GENERAL  Appears comfortable and in no distress   HEENT  NC/ AT   cardiovascular  S1 and S2 heard; palpation of pulses: radial pulse    NECK  Supple and no bruits heard   MENTAL STATUS:  Alert, oriented, intact memory, no confusion, normal speech, normal language, no hallucination or delusion   CRANIAL NERVES: II     -      PERRLA, optic discs; clear; Vsual fields intact to confrontation  III,IV,VI -  EOMs full, no afferent defect, no TRESSA, no ptosis  V     -     Normal facial sensation VII    -     Normal facial symmetry  VIII   -     Intact hearing   IX,X -     Symmetrical palate  XI    -     Symmetrical shoulder shrug  XII   -     Midline tongue, no atrophy    MOTOR FUNCTION:  significant for good strength of grade 5/5 in bilateral proximal and distal muscle groups of both upper and lower extremities with normal bulk, normal tone and no involuntary movements, no tremor   SENSORY FUNCTION:  Normal touch, normal pin, normal vibration, normal proprioception   CEREBELLAR FUNCTION:  Intact fine motor control over upper limbs   REFLEX FUNCTION:  Symmetric, no perverted reflex, no Babinski sign   STATION and GAIT  Not tested         Data:    Lab Results:   CBC:   Recent Labs     07/29/20 1657 07/30/20  0613 08/01/20  0834   WBC 8.4 6.6 7.0   HGB 13.7 12.6 13.7    182 198     BMP:    Recent Labs     07/29/20 1657 07/30/20  0613 08/01/20  0834    139 138   K 4.1 4.2 4.1    103 100   CO2 24 23 25   BUN 17 17 18   CREATININE 0.85 0.89 0.85   GLUCOSE 101* 161* 174*         Lab Results   Component Value Date    CHOL 130 02/20/2020    LDLCHOLESTEROL 41 02/20/2020    HDL 56 02/20/2020    TRIG 163 (H) 02/20/2020    ALT 15 11/14/2019    AST 23 11/14/2019    TSH 0.76 02/20/2020    INR 1.0 07/30/2020    LABA1C 7.7 (H) 07/30/2020    LABMICR CANNOT BE CALCULATED 03/20/2019     MRI brain (with/without) 7/31/2020: No acute intracranial abnormality. Left mastoid effusion with associated enhancement. Small extra-axial enhancing lesion along left frontal convexity measuring 5 x 10 mm representing small meningioma. Impression and Plan: Ms. Ravindra Miller is a 78 y.o. female with   Positional vertigo; likely vestibular neuronitis with mastoid effusion: On symptomatic therapy with meclizine and steroids and ceftriaxone. ENT consulted.   Left frontal meningioma: Stable on comparison to 4/25/2017 study  Comorbid conditions include hypertension, paroxysmal A. fib, DM, HEMALATHA on nightly Dana. Will follow with you.

## 2020-08-01 NOTE — PLAN OF CARE
Problem: Falls - Risk of:  Goal: Will remain free from falls  Description: Will remain free from falls  8/1/2020 0513 by Kelvin Alanis RN  Outcome: Met This Shift  7/31/2020 1525 by George Hernandez RN  Outcome: Ongoing  Goal: Absence of physical injury  Description: Absence of physical injury  8/1/2020 0513 by Kelvin Alanis RN  Outcome: Met This Shift  7/31/2020 1525 by George Hernandez RN  Outcome: Ongoing     Problem: Pain:  Goal: Pain level will decrease  Description: Pain level will decrease  8/1/2020 0513 by Kelvin Alanis RN  Outcome: Ongoing  7/31/2020 1525 by George Hernandez RN  Outcome: Ongoing  Goal: Control of acute pain  Description: Control of acute pain  8/1/2020 0513 by Kelvin Alanis RN  Outcome: Ongoing  7/31/2020 1525 by George Hernandez RN  Outcome: Ongoing  Goal: Control of chronic pain  Description: Control of chronic pain  8/1/2020 0513 by Kelvin Alanis RN  Outcome: Ongoing  7/31/2020 1525 by George Hernandez RN  Outcome: Ongoing     Problem: Skin Integrity:  Goal: Will show no infection signs and symptoms  Description: Will show no infection signs and symptoms  8/1/2020 0513 by Kelvin Alanis RN  Outcome: Ongoing  7/31/2020 1525 by George Hernandez RN  Outcome: Ongoing  Goal: Absence of new skin breakdown  Description: Absence of new skin breakdown  8/1/2020 0513 by Kelvin Alanis RN  Outcome: Ongoing  7/31/2020 1525 by George Hernandez RN  Outcome: Ongoing     Problem: Musculor/Skeletal Functional Status  Goal: Highest potential functional level  8/1/2020 0513 by Kelvin Alanis RN  Outcome: Ongoing  7/31/2020 1525 by George Hernandez RN  Outcome: Ongoing

## 2020-08-02 LAB
ABSOLUTE EOS #: <0.03 K/UL (ref 0–0.44)
ABSOLUTE IMMATURE GRANULOCYTE: 0.03 K/UL (ref 0–0.3)
ABSOLUTE LYMPH #: 1.07 K/UL (ref 1.1–3.7)
ABSOLUTE MONO #: 0.28 K/UL (ref 0.1–1.2)
ANION GAP SERPL CALCULATED.3IONS-SCNC: 13 MMOL/L (ref 9–17)
BASOPHILS # BLD: 0 % (ref 0–2)
BASOPHILS ABSOLUTE: <0.03 K/UL (ref 0–0.2)
BUN BLDV-MCNC: 23 MG/DL (ref 8–23)
BUN/CREAT BLD: ABNORMAL (ref 9–20)
CALCIUM SERPL-MCNC: 9.5 MG/DL (ref 8.6–10.4)
CHLORIDE BLD-SCNC: 98 MMOL/L (ref 98–107)
CO2: 24 MMOL/L (ref 20–31)
CREAT SERPL-MCNC: 0.9 MG/DL (ref 0.5–0.9)
DIFFERENTIAL TYPE: ABNORMAL
EOSINOPHILS RELATIVE PERCENT: 0 % (ref 1–4)
GFR AFRICAN AMERICAN: >60 ML/MIN
GFR NON-AFRICAN AMERICAN: >60 ML/MIN
GFR SERPL CREATININE-BSD FRML MDRD: ABNORMAL ML/MIN/{1.73_M2}
GFR SERPL CREATININE-BSD FRML MDRD: ABNORMAL ML/MIN/{1.73_M2}
GLUCOSE BLD-MCNC: 180 MG/DL (ref 65–105)
GLUCOSE BLD-MCNC: 217 MG/DL (ref 65–105)
GLUCOSE BLD-MCNC: 232 MG/DL (ref 70–99)
GLUCOSE BLD-MCNC: 252 MG/DL (ref 65–105)
GLUCOSE BLD-MCNC: 254 MG/DL (ref 65–105)
HCT VFR BLD CALC: 41.5 % (ref 36.3–47.1)
HEMOGLOBIN: 13.5 G/DL (ref 11.9–15.1)
IMMATURE GRANULOCYTES: 0 %
LYMPHOCYTES # BLD: 12 % (ref 24–43)
MCH RBC QN AUTO: 29.6 PG (ref 25.2–33.5)
MCHC RBC AUTO-ENTMCNC: 32.5 G/DL (ref 28.4–34.8)
MCV RBC AUTO: 91 FL (ref 82.6–102.9)
MONOCYTES # BLD: 3 % (ref 3–12)
NRBC AUTOMATED: 0 PER 100 WBC
PDW BLD-RTO: 14.2 % (ref 11.8–14.4)
PLATELET # BLD: 223 K/UL (ref 138–453)
PLATELET ESTIMATE: ABNORMAL
PMV BLD AUTO: 11.7 FL (ref 8.1–13.5)
POTASSIUM SERPL-SCNC: 4.6 MMOL/L (ref 3.7–5.3)
RBC # BLD: 4.56 M/UL (ref 3.95–5.11)
RBC # BLD: ABNORMAL 10*6/UL
SEG NEUTROPHILS: 85 % (ref 36–65)
SEGMENTED NEUTROPHILS ABSOLUTE COUNT: 7.73 K/UL (ref 1.5–8.1)
SODIUM BLD-SCNC: 135 MMOL/L (ref 135–144)
WBC # BLD: 9.1 K/UL (ref 3.5–11.3)
WBC # BLD: ABNORMAL 10*3/UL

## 2020-08-02 PROCEDURE — 99233 SBSQ HOSP IP/OBS HIGH 50: CPT | Performed by: INTERNAL MEDICINE

## 2020-08-02 PROCEDURE — 6370000000 HC RX 637 (ALT 250 FOR IP): Performed by: STUDENT IN AN ORGANIZED HEALTH CARE EDUCATION/TRAINING PROGRAM

## 2020-08-02 PROCEDURE — 96366 THER/PROPH/DIAG IV INF ADDON: CPT

## 2020-08-02 PROCEDURE — 6370000000 HC RX 637 (ALT 250 FOR IP): Performed by: NURSE PRACTITIONER

## 2020-08-02 PROCEDURE — 6360000002 HC RX W HCPCS: Performed by: STUDENT IN AN ORGANIZED HEALTH CARE EDUCATION/TRAINING PROGRAM

## 2020-08-02 PROCEDURE — 6370000000 HC RX 637 (ALT 250 FOR IP): Performed by: PSYCHIATRY & NEUROLOGY

## 2020-08-02 PROCEDURE — 1200000000 HC SEMI PRIVATE

## 2020-08-02 PROCEDURE — 36415 COLL VENOUS BLD VENIPUNCTURE: CPT

## 2020-08-02 PROCEDURE — 80048 BASIC METABOLIC PNL TOTAL CA: CPT

## 2020-08-02 PROCEDURE — 82947 ASSAY GLUCOSE BLOOD QUANT: CPT

## 2020-08-02 PROCEDURE — 94660 CPAP INITIATION&MGMT: CPT

## 2020-08-02 PROCEDURE — 99232 SBSQ HOSP IP/OBS MODERATE 35: CPT | Performed by: PSYCHIATRY & NEUROLOGY

## 2020-08-02 PROCEDURE — 85025 COMPLETE CBC W/AUTO DIFF WBC: CPT

## 2020-08-02 PROCEDURE — 2580000003 HC RX 258: Performed by: STUDENT IN AN ORGANIZED HEALTH CARE EDUCATION/TRAINING PROGRAM

## 2020-08-02 PROCEDURE — 2580000003 HC RX 258: Performed by: NURSE PRACTITIONER

## 2020-08-02 RX ADMIN — METFORMIN HYDROCHLORIDE 1000 MG: 500 TABLET ORAL at 21:00

## 2020-08-02 RX ADMIN — INSULIN LISPRO 3 UNITS: 100 INJECTION, SOLUTION INTRAVENOUS; SUBCUTANEOUS at 08:20

## 2020-08-02 RX ADMIN — APIXABAN 5 MG: 5 TABLET, FILM COATED ORAL at 21:00

## 2020-08-02 RX ADMIN — FUROSEMIDE 20 MG: 20 TABLET ORAL at 08:18

## 2020-08-02 RX ADMIN — AMLODIPINE BESYLATE 10 MG: 10 TABLET ORAL at 08:18

## 2020-08-02 RX ADMIN — ATORVASTATIN CALCIUM 80 MG: 80 TABLET, FILM COATED ORAL at 21:00

## 2020-08-02 RX ADMIN — PANTOPRAZOLE SODIUM 40 MG: 40 TABLET, DELAYED RELEASE ORAL at 05:29

## 2020-08-02 RX ADMIN — METFORMIN HYDROCHLORIDE 1000 MG: 500 TABLET ORAL at 15:37

## 2020-08-02 RX ADMIN — METOPROLOL TARTRATE 25 MG: 25 TABLET ORAL at 21:00

## 2020-08-02 RX ADMIN — PREDNISONE 30 MG: 20 TABLET ORAL at 08:17

## 2020-08-02 RX ADMIN — INSULIN LISPRO 2 UNITS: 100 INJECTION, SOLUTION INTRAVENOUS; SUBCUTANEOUS at 13:32

## 2020-08-02 RX ADMIN — APIXABAN 5 MG: 5 TABLET, FILM COATED ORAL at 08:17

## 2020-08-02 RX ADMIN — MECLIZINE HYDROCHLORIDE 25 MG: 12.5 TABLET, FILM COATED ORAL at 13:31

## 2020-08-02 RX ADMIN — CEFTRIAXONE SODIUM 2 G: 2 INJECTION, POWDER, FOR SOLUTION INTRAMUSCULAR; INTRAVENOUS at 05:29

## 2020-08-02 RX ADMIN — Medication 10 ML: at 21:01

## 2020-08-02 RX ADMIN — MECLIZINE HYDROCHLORIDE 25 MG: 12.5 TABLET, FILM COATED ORAL at 21:00

## 2020-08-02 RX ADMIN — ACETAMINOPHEN 650 MG: 325 TABLET ORAL at 11:10

## 2020-08-02 RX ADMIN — METOPROLOL TARTRATE 25 MG: 25 TABLET ORAL at 08:18

## 2020-08-02 RX ADMIN — MECLIZINE HYDROCHLORIDE 25 MG: 12.5 TABLET, FILM COATED ORAL at 08:18

## 2020-08-02 RX ADMIN — OXYBUTYNIN CHLORIDE 10 MG: 10 TABLET, EXTENDED RELEASE ORAL at 08:17

## 2020-08-02 RX ADMIN — Medication 10 ML: at 08:18

## 2020-08-02 ASSESSMENT — PAIN SCALES - GENERAL
PAINLEVEL_OUTOF10: 3
PAINLEVEL_OUTOF10: 0
PAINLEVEL_OUTOF10: 2

## 2020-08-02 NOTE — PROGRESS NOTES
NEUROLOGY INPATIENT PROGRESS NOTE    8/2/2020         Subjective: Tressa Francisco is a  78 y.o. female admitted on 7/29/2020 with Dizziness [R42]  Dizziness [R42]  Vertigo [R42]  Chart reviewed. Discussed with caregivers. No acute issues overnight. Says \"feeling much better\". Briefly, this is a  78 y.o. female admitted on 7/29/2020 with three day hx of dizziness/vertigo with nausea with unsteady gait stating that she nearly falls over and has to hold onto the wall. She admits to nausea but denies vomiting. There is vertiginous spells last for a minute or 2 but occur with movement of the head and lying on the left side. She also has decreased hearing and tinnitus on the left side. She had MRI demonstrating mastoiditis on left side and she has been on steroids and ceftriaxone with \"some improvement\" but not resolved. She lives alone; she is afraid to go back because of ongoing vertigo with fear of falling. No current facility-administered medications on file prior to encounter. Current Outpatient Medications on File Prior to Encounter   Medication Sig Dispense Refill    meclizine (ANTIVERT) 12.5 MG tablet Take 1 tablet by mouth 3 times daily as needed for Dizziness 30 tablet 0    metFORMIN (GLUCOPHAGE) 1000 MG tablet TAKE 1 TABLET BY MOUTH TWICE DAILY WITH MEALS 180 tablet 3    metoprolol tartrate (LOPRESSOR) 25 MG tablet TAKE 1 TABLET BY MOUTH TWICE DAILY 180 tablet 3    amitriptyline (ELAVIL) 50 MG tablet TAKE 1 TABLET BY MOUTH EVERY NIGHT 90 tablet 3    furosemide (LASIX) 20 MG tablet TAKE 1 TABLET BY MOUTH DAILY 60 tablet 3    pantoprazole (PROTONIX) 40 MG tablet TAKE 1 TABLET EVERY MORNING (BEFORE BREAKFAST) 90 tablet 3    amLODIPine (NORVASC) 10 MG tablet TAKE 1 TABLET BY MOUTH DAILY 30 tablet 5    blood glucose monitor strips Test 3 times a day.  Diag: DM  strip 11    atorvastatin (LIPITOR) 80 MG tablet TAKE 1 TABLET BY MOUTH EVERY NIGHT 30 tablet 0    ELIQUIS 5 MG TABS tablet Take 5 mg by mouth 2 times daily  3    oxybutynin (DITROPAN-XL) 10 MG extended release tablet Take 1 tablet by mouth daily 30 tablet 3    nystatin (MYCOSTATIN) 169633 UNIT/GM powder Apply 3 times daily. 60 g 5    Lancets MISC 1 each by Does not apply route daily Use 3 times daily Diagnisis:250.0  Diabetes Mellitus Non-Insulin Dependent 100 each 11    nitroGLYCERIN (NITROSTAT) 0.4 MG SL tablet Place 1 tablet under the tongue every 5 minutes as needed for Chest pain up to max of 3 total doses. If no relief after 1 dose, call 911. 25 tablet 3    magnesium hydroxide (MILK OF MAGNESIA) 400 MG/5ML suspension Take 5 mLs by mouth daily as needed for Constipation         Allergies: José Antonioorismoisés Back is allergic to lisinopril; pcn [penicillins]; fish-derived products; and losartan.     Past Medical History:   Diagnosis Date    HOSSEIN (acute kidney injury) ([facility] [facility] 3/3/2017    Arthritis     shoulders    Chronic diastolic congestive heart failure ([facility] [facility] 3/3/2017    Chronic kidney disease     Coronary artery disease involving native coronary artery of native heart without angina pectoris 10/13/2016    Epistaxis since Jan. 17 2019    GERD (gastroesophageal reflux disease)     Headache     Hyperlipidemia     Hypertension     MI (myocardial infarction) ([facility] [address] 2015    Dr. Cordelia Hernandez [facility]romuscular [facility])     Osteoarthritis     Patient in clinical research study 03/06/2017    AbsorbIV    Sleep apnea     CPAP    Snores     Type II or unspecified type diabetes mellitus without mention of complication, not stated as uncontrolled     Use of cane as ambulatory aid     Wears contact lenses     LEFT EYE ONLY       Past Surgical History:   Procedure Laterality Date    CONTROL OF NOSE BLEED LIGATION & NASAL ENDOSCOPY Right 04/08/2019    turbinoplasty    CORONARY ANGIOPLASTY WITH STENT PLACEMENT  2015    2 stents/ Dr. Hervey Hamman, [facility] ARTHROSCOPY Left     NASAL SINUS SURGERY Right 4/8/2019    NASAL ENDOSCOPIC, NASAL CAUTERY performed by Diaz Cantu MD at [address] Right 07/21/2016    TOTAL KNEE ARTHROPLASTY Left 03/2016    TURBINOPLASTIES Right 4/8/2019    TURBINOPLASTY performed by [facility] at [facility]           Medications:     metFORMIN  1,000 mg Oral BID    cefTRIAXone (ROCEPHIN) IV  2 g Intravenous Q12H    predniSONE  30 mg Oral BID    meclizine  25 mg Oral TID    [Held by provider] amitriptyline  50 mg Oral Nightly    amLODIPine  10 mg Oral Daily    atorvastatin  80 mg Oral Nightly    apixaban  5 mg Oral BID    furosemide  20 mg Oral Daily    metoprolol tartrate  25 mg Oral BID    oxybutynin  10 mg Oral Daily    pantoprazole  40 mg Oral QAM AC    sodium chloride flush  10 mL Intravenous 2 times per day    insulin lispro  0-6 Units Subcutaneous TID WC    insulin lispro  0-3 Units Subcutaneous Nightly     PRN Meds include: sodium chloride flush, sodium chloride flush, potassium chloride **OR** potassium alternative oral replacement **OR** potassium chloride, magnesium sulfate, acetaminophen **OR** acetaminophen, polyethylene glycol, promethazine **OR** ondansetron, nicotine, glucose, dextrose, glucagon (rDNA), dextrose    Objective:   /87   Pulse 72   Temp 97.9 °F (36.6 °C) (Oral)   Resp 18   Ht 5' 9\" (1.753 m)   Wt (!) 310 lb (140.6 kg)   SpO2 98%   BMI 45.78 kg/m²     Blood pressure range: Systolic (08FOH), IGU:516 , Min:135 , UKJ:304   ; Diastolic (73VQP), DWM:98, Min:83, Max:87        NEUROLOGIC EXAMINATION  GENERAL  Appears comfortable and in no distress   HEENT  NC/ AT   cardiovascular  S1 and S2 heard; palpation of pulses: radial pulse    NECK  Supple and no bruits heard   MENTAL STATUS:  Alert, oriented, intact memory, no confusion, normal speech, normal language, no hallucination or delusion   CRANIAL NERVES: II     -      PERRLA, optic discs; clear; posterior segments  Visual fields intact to confrontation  III,IV,VI -  EOMs full, no afferent defect, no TRESSA, no ptosis  V     -     Normal facial sensation   VII    -     Normal facial symmetry  VIII   -     Intact hearing   IX,X -     Symmetrical palate  XI    -     Symmetrical shoulder shrug  XII   -     Midline tongue, no atrophy    MOTOR FUNCTION:  significant for good strength of grade 5/5 in bilateral proximal and distal muscle groups of both upper and lower extremities with normal bulk, normal tone and no involuntary movements, no tremor   SENSORY FUNCTION:  Normal touch, normal pin, normal vibration, normal proprioception   CEREBELLAR FUNCTION:  Intact fine motor control over upper limbs   REFLEX FUNCTION:  Symmetric, no perverted reflex, no Babinski sign   STATION and GAIT  Not tested         Data:    Lab Results:   CBC:   Recent Labs     08/01/20  0834 08/02/20  0438   WBC 7.0 9.1   HGB 13.7 13.5    223     BMP:    Recent Labs     08/01/20  0834 08/02/20  0438    135   K 4.1 4.6    98   CO2 25 24   BUN 18 23   CREATININE 0.85 0.90   GLUCOSE 174* 232*         Lab Results   Component Value Date    CHOL 130 02/20/2020    LDLCHOLESTEROL 41 02/20/2020    HDL 56 02/20/2020    TRIG 163 (H) 02/20/2020    ALT 15 11/14/2019    AST 23 11/14/2019    TSH 0.76 02/20/2020    INR 1.0 07/30/2020    LABA1C 7.7 (H) 07/30/2020    LABMICR CANNOT BE CALCULATED 03/20/2019     MRI brain (with/without) 7/31/2020: No acute intracranial abnormality. Left mastoid effusion with associated enhancement. Small extra-axial enhancing lesion along left frontal convexity measuring 5 x 10 mm representing small meningioma. Impression and Plan: Ms. Gio Harris is a 78 y.o. female with   Positional vertigo; question vestibular neuronitis; on symptomatic therapy with meclizine;  ENT consulted; recommended to continue symptomatic therapy.   Left frontal meningioma: Stable on comparison to 4/25/2017 study  Comorbid conditions include hypertension,

## 2020-08-02 NOTE — PROGRESS NOTES
[facility]  Internal Medicine Teaching Residency Program  Inpatient Daily Progress Note  ______________________________________________________________________________    Patient: Itz Feldman  YOB: 1950   [id number]    Acct: [de-identified]     Room: [facility]/0322-02  Admit date: 7/29/2020  Today's date: 08/02/20  Number of days in the hospital: 4    SUBJECTIVE     Admitting Diagnosis:   CC: Dizziness     Patient seen and examined in her room. She continues to have dizziness. She she stated that she had one bad spell this morning while lying in bed the dizziness lasted for half an hour. Denies fall or head injury. Afebrile and hemodynamically stable saturating well on room air    Echo reviewed EF 52%. Grade 1 diastolic dysfunction. Monitor rate concentric LVH. MRI brain: Left mastoid effusion and small meningioma left frontal convexity 5 x 10 mm. No acute intracranial abnormality. Labs reviewed  Fasting blood glucose 232, starting on home dose metformin    Patient may need to go to SNF before she can go home due to high risk for fall    BRIEF HISTORY         69 y.o. female presented to the ER for evaluation of dizziness and nausea for last 3 days  as recommended by Dr. Lexa Ware her office phone visit. Patient states that she is not able to get out of her bed for the last 3 days. Marcela Velarde has a spinning sensation when she tries to get out of bed.  Patient describes dizziness as room spinning, she states that when she gets up she nearly falls over and has to hold onto the wall and then immediately needs to sit back down, however when she does stand she feels the room is spinning. Rey Hodgeman is h/o nausea and tinnitus but no vomiting.  Patient feels weak. Patient states this has never happened before. Jamaal Carranza denies any syncopal episode. Patient tried over-the-counter motion sickness medication but did not get any relief.   Patient has history  cefTRIAXone (ROCEPHIN) IV  2 g Intravenous Q12H    predniSONE  30 mg Oral BID    meclizine  25 mg Oral TID    [Held by provider] amitriptyline  50 mg Oral Nightly    amLODIPine  10 mg Oral Daily    atorvastatin  80 mg Oral Nightly    apixaban  5 mg Oral BID    furosemide  20 mg Oral Daily    metoprolol tartrate  25 mg Oral BID    oxybutynin  10 mg Oral Daily    pantoprazole  40 mg Oral QAM AC    sodium chloride flush  10 mL Intravenous 2 times per day    insulin lispro  0-6 Units Subcutaneous TID WC    insulin lispro  0-3 Units Subcutaneous Nightly     Continuous Infusions:    dextrose       PRN Medicationssodium chloride flush, 10 mL, PRN  sodium chloride flush, 10 mL, PRN  potassium chloride, 40 mEq, PRN    Or  potassium alternative oral replacement, 40 mEq, PRN    Or  potassium chloride, 10 mEq, PRN  magnesium sulfate, 1 g, PRN  acetaminophen, 650 mg, Q6H PRN    Or  acetaminophen, 650 mg, Q6H PRN  polyethylene glycol, 17 g, Daily PRN  promethazine, 12.5 mg, Q6H PRN    Or  ondansetron, 4 mg, Q6H PRN  nicotine, 1 patch, Daily PRN  glucose, 15 g, PRN  dextrose, 12.5 g, PRN  glucagon (rDNA), 1 mg, PRN  dextrose, 100 mL/hr, PRN        Diagnostic Labs:  CBC:   Recent Labs     08/01/20  0834 08/02/20  0438   WBC 7.0 9.1   RBC 4.80 4.56   HGB 13.7 13.5   HCT 43.7 41.5   MCV 91.0 91.0   RDW 14.2 14.2    223     BMP:   Recent Labs     08/01/20  0834 08/02/20  0438    135   K 4.1 4.6    98   CO2 25 24   BUN 18 23   CREATININE 0.85 0.90        ASSESSMENT & PLAN   1. Vertigo due to BPPV: No clinical signs of acute mastoiditis, ENT ruled out mastoiditis. Discontinue antibiotic steroid and Afrin nasal spray. Continue Antivert 25 mg thrice daily. Appreciate ENT recommendations. Amitriptyline  held. Neurology recommends outpatient vestibular rehab. Vestibular physical therapy outpatient.   ENT recommends following up outpatient for VNG if patient does not respond to vestibular rehab.  2. Chronic diastolic CHF. Continue Lopressor 25 mg BID and Lasix 20 mg daily  3. Paroxysmal A. Fib: Rate controlled. Continue Eliquis 5 mg twice daily and Lopressor 25 mg twice daily. 4. Type 2 diabetes mellitus: Fasting blood glucose 232. A1c 7/30/2020: 7.7. Sliding scale insulin. Starting on home dose metformin hypoglycemia protocol. 5. HEMALATHA: BiPAP at night. 6. Morbid obesity. Counseled   7. Essential hypertension in control. Continue Lopressor 25 mg twice daily  8. CAD s/p stent placement: 2016 and 2018. Continue aspirin 81 mg, on Lopressor and Lipitor. Echo: EF 80% grade 1 diastolic dysfunction    DVT ppx : Eliquis for A. fib  GI ppx: Protonix 40 mg daily    PT/OT: Consulted  Discharge Planning / SW: Home, independently    Cordelia Steen MD  Internal Medicine Resident, PGY-2  [facility]; [place], [place]  8/2/2020, 1:13 PM      Attending Physician Statement  I have discussed the case, including pertinent history and exam findings with the resident and the team.  I have seen and examined the patient and the key elements of the encounter have been performed by me. I agree with the assessment, plan and orders as documented by the resident.       Mbonu Glady Ganser, MD   Attending Physician, Internal Medicine Residency Program  8/2/2020, 6:29 PM

## 2020-08-02 NOTE — CONSULTS
pectoris 10/13/2016    Epistaxis since Jan. 17 2019    GERD (gastroesophageal reflux disease)     Headache     Hyperlipidemia     Hypertension     MI (myocardial infarction) ([facility] [address] 2015    Dr. Chino Ceja Neuromuscular disorder [facility])     Osteoarthritis     Patient in clinical research study 03/06/2017    AbsorbIV    Sleep apnea     CPAP    Snores     Type II or unspecified type diabetes mellitus without mention of complication, not stated as uncontrolled     Use of cane as ambulatory aid     Wears contact lenses     LEFT EYE ONLY          Surgical   History:       Procedure Laterality Date    CONTROL OF NOSE BLEED LIGATION & NASAL ENDOSCOPY Right 04/08/2019    turbinoplasty    CORONARY ANGIOPLASTY WITH STENT PLACEMENT  2015    2 stents/ Dr. Kenyetta Coppola, [address] [facility] ARTHROSCOPY Left     NASAL SINUS SURGERY Right 4/8/2019    NASAL ENDOSCOPIC, NASAL CAUTERY performed by Chanda Christian MD at [address] [place] [address] Right 07/21/2016    TOTAL KNEE ARTHROPLASTY Left 03/2016    TURBINOPLASTIES Right 4/8/2019    TURBINOPLASTY performed by Chanda Christian MD at [place] [address]          Medications: Current Facility-Administered Medications: sodium chloride flush 0.9 % injection 10 mL, 10 mL, Intravenous, PRN  cefTRIAXone (ROCEPHIN) 2 g IVPB in D5W 50ml minibag, 2 g, Intravenous, Q12H  predniSONE (DELTASONE) tablet 30 mg, 30 mg, Oral, BID  meclizine (ANTIVERT) tablet 25 mg, 25 mg, Oral, TID  [Held by provider] amitriptyline (ELAVIL) tablet 50 mg, 50 mg, Oral, Nightly  amLODIPine (NORVASC) tablet 10 mg, 10 mg, Oral, Daily  atorvastatin (LIPITOR) tablet 80 mg, 80 mg, Oral, Nightly  apixaban (ELIQUIS) tablet 5 mg, 5 mg, Oral, BID  furosemide (LASIX) tablet 20 mg, 20 mg, Oral, Daily  metoprolol tartrate (LOPRESSOR) tablet 25 mg, 25 mg, Oral, BID  oxybutynin (DITROPAN-XL) extended release tablet 10 mg, 10 mg, Oral, Daily  pantoprazole (PROTONIX) tablet 40 mg, 40 mg, Oral, QAM AC  sodium chloride flush 0.9 % injection 10 mL, 10 mL, Intravenous, 2 times per day  sodium chloride flush 0.9 % injection 10 mL, 10 mL, Intravenous, PRN  potassium chloride (KLOR-CON M) extended release tablet 40 mEq, 40 mEq, Oral, PRN **OR** potassium bicarb-citric acid (EFFER-K) effervescent tablet 40 mEq, 40 mEq, Oral, PRN **OR** potassium chloride 10 mEq/100 mL IVPB (Peripheral Line), 10 mEq, Intravenous, PRN  magnesium sulfate 1 g in dextrose 5% 100 mL IVPB, 1 g, Intravenous, PRN  acetaminophen (TYLENOL) tablet 650 mg, 650 mg, Oral, Q6H PRN **OR** acetaminophen (TYLENOL) suppository 650 mg, 650 mg, Rectal, Q6H PRN  polyethylene glycol (GLYCOLAX) packet 17 g, 17 g, Oral, Daily PRN  promethazine (PHENERGAN) tablet 12.5 mg, 12.5 mg, Oral, Q6H PRN **OR** ondansetron (ZOFRAN) injection 4 mg, 4 mg, Intravenous, Q6H PRN  nicotine (NICODERM CQ) 21 MG/24HR 1 patch, 1 patch, Transdermal, Daily PRN  glucose (GLUTOSE) 40 % oral gel 15 g, 15 g, Oral, PRN  dextrose 50 % IV solution, 12.5 g, Intravenous, PRN  glucagon (rDNA) injection 1 mg, 1 mg, Intramuscular, PRN  dextrose 5 % solution, 100 mL/hr, Intravenous, PRN  insulin lispro (HUMALOG) injection vial 0-6 Units, 0-6 Units, Subcutaneous, TID WC  insulin lispro (HUMALOG) injection vial 0-3 Units, 0-3 Units, Subcutaneous, Nightly     Allergies:      Lisinopril; Pcn [penicillins];  Fish-derived products; and Losartan    Social History:    Social History     Socioeconomic History    Marital status:      Spouse name: None    Number of children: 2    Years of education: None    Highest education level: None   Occupational History    Occupation: retired   Social Needs    Financial resource strain: None    Food insecurity     Worry: None     Inability: None    Transportation needs     Medical: None     Non-medical: None   Tobacco Use    Smoking status: Never Smoker    Smokeless tobacco: Never Used   Substance and Sexual Activity    Alcohol use: Never Frequency: Never    Drug use: Never    Sexual activity: None   Lifestyle    Physical activity     Days per week: None     Minutes per session: None    Stress: None   Relationships    Social connections     Talks on phone: None     Gets together: None     Attends [employer]al service: None     Active member of club or organization: None     Attends meetings of clubs or organizations: None     Relationship status: None    Intimate partner violence     Fear of current or ex partner: None     Emotionally abused: None     Physically abused: None     Forced sexual activity: None   Other Topics Concern    None   Social History Narrative    None       Family   History:       Problem Relation Age of Onset    Stroke Mother     Heart Disease Mother     Heart Disease Father     Heart Surgery Father 39    Migraines Sister     Heart Disease Brother     Heart Attack Brother 79    Stroke Brother     Other Son         Hit by drunken        REVIEW   OF   SYSTEMS:      As   above   and:   CONSTITUTIONAL:  negative for fevers, chills, sweats, fatigue, weight loss  HEENT:  negative for vision, hearing changes, runny nose, throat pain +vertigo  RESPIRATORY:  negative for shortness of breath, cough, congestion, wheezing  CARDIOVASCULAR:  negative for chest pain, palpitations  GASTROINTESTINAL:  negative for nausea, vomiting, diarrhea, constipation, change in bowel habits, abdominal pain   GENITOURINARY:  negative for difficulty of urination, burning with urination, frequency   INTEGUMENT:  negative for rash, skin lesions, easy bruising   HEMATOLOGIC/LYMPHATIC:  negative for swelling/edema   ALLERGIC/IMMUNOLOGIC:  negative for urticaria , itching  ENDOCRINE:  negative increase in drinking, increase in urination, hot or cold intolerance  MUSCULOSKELETAL:  negative joint pains, muscle aches, swelling of joints  NEUROLOGICAL:  negative for headaches, dizziness, lightheadedness, numbness, pain, tingling extremities  BEHAVIOR/PSYCH:  negative for depression, anxiety    DIAGNOSTIC STUDIES REVIEWED:  Labs   and   Radiology   reports/films   Reviewed  MRI Brain   Impression    1. No acute intracranial abnormality.  No acute infarct. 2. There is a left mastoid effusion with associated enhancement.  Recommend    clinical correlation for mastoiditis. 3. No abnormality seen within the cerebellopontine angle cisterns or internal    auditory canals. 4. Small extra-axial enhancing lesion along the left frontal convexity    measuring 5 x 10 mm.  This may represent a small meningioma. CT Brain   FINDINGS:    No acute intracranial hemorrhage.  No mass effect.  No midline shift. Ventricles and sulci are within normal limits.         Partially empty sella.         No acute soft tissue abnormality.  No acute orbital abnormality.  No acute    osseous abnormality.  No significant paranasal sinus disease.              Impression    No acute intracranial findings. EXAM:   VITALS:      /87   Pulse 72   Temp 97.9 °F (36.6 °C) (Oral)   Resp 18   Ht 5' 9\" (1.753 m)   Wt (!) 310 lb (140.6 kg)   SpO2 98%   BMI 45.78 kg/m²     CONSTITUTIONAL:      awake,   alert,   cooperative,   no   apparent   distress,   and   appears   stated   age   No labored breathing, no stridor, normal voice  EYES:      Lids   and   lashes   normal,   pupils   equal,   round   and   reactive   to   light,   extra   ocular   muscles intact,   sclera   clear,   conjunctiva   Normal  HEAD:      Normocephalic,   without   obvious   abnormality,   atraumatic,   sinuses   nontender   on   palpation,   EARS:external   ears   without   lesions,   Minimal wax noted, TM's normal bilaterally with no reythema or bulging.   No mastoid tenderness  NOSE: patent nasal airway, no bleeding noted, no lesions  ORAL: oral cavity &  pharynx   with   moist   mucus   membranes,   tonsils   without erythema   or   exudates,   no fluctuance, uvula midline, gums   normal   and   good   Dentition. The floor of mouth is soft without induration, the tongue base is soft as well. NECK:      Supple,   symmetrical,   trachea   midline,   no   adenopathy,   thyroid   symmetric,   not   enlarged and   no   tenderness,   skin   Normal  CHEST: equal expansion and symmetric, lungs CTA  CV: Heart RRR  MUSCULOSKELETAL:      There   is   no   redness,   warmth,   or   swelling   of   the   joints. Full   range   of motion   noted. Motor   strength   is   5   out   of   5   all   extremities   bilaterally. Tone   is   normal.   NEUROLOGIC:      Awake,   alert,   oriented   to   name,   place   and   time. Cranial   nerves   II-XII   are grossly   intact. Motor   is   5   out   of   5   bilaterally. Sensory   is   Intact.   PSYCH: Normal Mood and affect  SKIN: Normal turgor, no rash      Electronically   signed   by   Fior Wallace MD   on   8/2/2020   at   12:43 PM  \

## 2020-08-03 LAB
ABSOLUTE EOS #: 0.06 K/UL (ref 0–0.44)
ABSOLUTE IMMATURE GRANULOCYTE: 0.04 K/UL (ref 0–0.3)
ABSOLUTE LYMPH #: 3.89 K/UL (ref 1.1–3.7)
ABSOLUTE MONO #: 1.09 K/UL (ref 0.1–1.2)
ANION GAP SERPL CALCULATED.3IONS-SCNC: 13 MMOL/L (ref 9–17)
BASOPHILS # BLD: 0 % (ref 0–2)
BASOPHILS ABSOLUTE: 0.03 K/UL (ref 0–0.2)
BUN BLDV-MCNC: 24 MG/DL (ref 8–23)
BUN/CREAT BLD: ABNORMAL (ref 9–20)
CALCIUM SERPL-MCNC: 9.1 MG/DL (ref 8.6–10.4)
CHLORIDE BLD-SCNC: 103 MMOL/L (ref 98–107)
CO2: 23 MMOL/L (ref 20–31)
CREAT SERPL-MCNC: 0.9 MG/DL (ref 0.5–0.9)
DIFFERENTIAL TYPE: ABNORMAL
EOSINOPHILS RELATIVE PERCENT: 1 % (ref 1–4)
GFR AFRICAN AMERICAN: >60 ML/MIN
GFR NON-AFRICAN AMERICAN: >60 ML/MIN
GFR SERPL CREATININE-BSD FRML MDRD: ABNORMAL ML/MIN/{1.73_M2}
GFR SERPL CREATININE-BSD FRML MDRD: ABNORMAL ML/MIN/{1.73_M2}
GLUCOSE BLD-MCNC: 106 MG/DL (ref 70–99)
GLUCOSE BLD-MCNC: 119 MG/DL (ref 65–105)
GLUCOSE BLD-MCNC: 121 MG/DL (ref 65–105)
GLUCOSE BLD-MCNC: 94 MG/DL (ref 65–105)
GLUCOSE BLD-MCNC: 96 MG/DL (ref 65–105)
HCT VFR BLD CALC: 42.7 % (ref 36.3–47.1)
HEMOGLOBIN: 13.4 G/DL (ref 11.9–15.1)
IMMATURE GRANULOCYTES: 0 %
LYMPHOCYTES # BLD: 32 % (ref 24–43)
MCH RBC QN AUTO: 29.5 PG (ref 25.2–33.5)
MCHC RBC AUTO-ENTMCNC: 31.4 G/DL (ref 28.4–34.8)
MCV RBC AUTO: 93.8 FL (ref 82.6–102.9)
MONOCYTES # BLD: 9 % (ref 3–12)
NRBC AUTOMATED: 0 PER 100 WBC
PDW BLD-RTO: 14.5 % (ref 11.8–14.4)
PLATELET # BLD: 217 K/UL (ref 138–453)
PLATELET ESTIMATE: ABNORMAL
PMV BLD AUTO: 10.2 FL (ref 8.1–13.5)
POTASSIUM SERPL-SCNC: 4 MMOL/L (ref 3.7–5.3)
RBC # BLD: 4.55 M/UL (ref 3.95–5.11)
RBC # BLD: ABNORMAL 10*6/UL
SEG NEUTROPHILS: 59 % (ref 36–65)
SEGMENTED NEUTROPHILS ABSOLUTE COUNT: 7.24 K/UL (ref 1.5–8.1)
SODIUM BLD-SCNC: 139 MMOL/L (ref 135–144)
WBC # BLD: 12.4 K/UL (ref 3.5–11.3)
WBC # BLD: ABNORMAL 10*3/UL

## 2020-08-03 PROCEDURE — 6370000000 HC RX 637 (ALT 250 FOR IP): Performed by: NURSE PRACTITIONER

## 2020-08-03 PROCEDURE — 76937 US GUIDE VASCULAR ACCESS: CPT

## 2020-08-03 PROCEDURE — 2580000003 HC RX 258: Performed by: NURSE PRACTITIONER

## 2020-08-03 PROCEDURE — 80048 BASIC METABOLIC PNL TOTAL CA: CPT

## 2020-08-03 PROCEDURE — 1200000000 HC SEMI PRIVATE

## 2020-08-03 PROCEDURE — 6370000000 HC RX 637 (ALT 250 FOR IP): Performed by: STUDENT IN AN ORGANIZED HEALTH CARE EDUCATION/TRAINING PROGRAM

## 2020-08-03 PROCEDURE — 94660 CPAP INITIATION&MGMT: CPT

## 2020-08-03 PROCEDURE — 97110 THERAPEUTIC EXERCISES: CPT

## 2020-08-03 PROCEDURE — 6370000000 HC RX 637 (ALT 250 FOR IP): Performed by: PSYCHIATRY & NEUROLOGY

## 2020-08-03 PROCEDURE — 85025 COMPLETE CBC W/AUTO DIFF WBC: CPT

## 2020-08-03 PROCEDURE — 99232 SBSQ HOSP IP/OBS MODERATE 35: CPT | Performed by: INTERNAL MEDICINE

## 2020-08-03 PROCEDURE — 97530 THERAPEUTIC ACTIVITIES: CPT

## 2020-08-03 PROCEDURE — 36415 COLL VENOUS BLD VENIPUNCTURE: CPT

## 2020-08-03 PROCEDURE — 82947 ASSAY GLUCOSE BLOOD QUANT: CPT

## 2020-08-03 RX ADMIN — PANTOPRAZOLE SODIUM 40 MG: 40 TABLET, DELAYED RELEASE ORAL at 06:19

## 2020-08-03 RX ADMIN — MECLIZINE HYDROCHLORIDE 25 MG: 12.5 TABLET, FILM COATED ORAL at 20:40

## 2020-08-03 RX ADMIN — ACETAMINOPHEN 650 MG: 325 TABLET ORAL at 02:10

## 2020-08-03 RX ADMIN — MECLIZINE HYDROCHLORIDE 25 MG: 12.5 TABLET, FILM COATED ORAL at 09:18

## 2020-08-03 RX ADMIN — OXYBUTYNIN CHLORIDE 10 MG: 10 TABLET, EXTENDED RELEASE ORAL at 09:18

## 2020-08-03 RX ADMIN — FUROSEMIDE 20 MG: 20 TABLET ORAL at 09:18

## 2020-08-03 RX ADMIN — ACETAMINOPHEN 650 MG: 325 TABLET ORAL at 22:57

## 2020-08-03 RX ADMIN — METFORMIN HYDROCHLORIDE 1000 MG: 500 TABLET ORAL at 09:18

## 2020-08-03 RX ADMIN — AMLODIPINE BESYLATE 10 MG: 10 TABLET ORAL at 09:18

## 2020-08-03 RX ADMIN — APIXABAN 5 MG: 5 TABLET, FILM COATED ORAL at 09:18

## 2020-08-03 RX ADMIN — METOPROLOL TARTRATE 25 MG: 25 TABLET ORAL at 20:40

## 2020-08-03 RX ADMIN — MECLIZINE HYDROCHLORIDE 25 MG: 12.5 TABLET, FILM COATED ORAL at 15:34

## 2020-08-03 RX ADMIN — METOPROLOL TARTRATE 25 MG: 25 TABLET ORAL at 09:18

## 2020-08-03 RX ADMIN — Medication 10 ML: at 09:18

## 2020-08-03 RX ADMIN — METFORMIN HYDROCHLORIDE 1000 MG: 500 TABLET ORAL at 20:40

## 2020-08-03 RX ADMIN — APIXABAN 5 MG: 5 TABLET, FILM COATED ORAL at 20:40

## 2020-08-03 RX ADMIN — Medication 10 ML: at 20:41

## 2020-08-03 RX ADMIN — ACETAMINOPHEN 650 MG: 325 TABLET ORAL at 09:17

## 2020-08-03 RX ADMIN — ATORVASTATIN CALCIUM 80 MG: 80 TABLET, FILM COATED ORAL at 20:40

## 2020-08-03 ASSESSMENT — PAIN SCALES - GENERAL
PAINLEVEL_OUTOF10: 3
PAINLEVEL_OUTOF10: 3
PAINLEVEL_OUTOF10: 2
PAINLEVEL_OUTOF10: 0
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
## 2020-08-03 NOTE — PROGRESS NOTES
[facility]  Internal Medicine Teaching Residency Program  Inpatient Daily Progress Note  ______________________________________________________________________________    Patient: Torsten Mcguire  YOB: 1950   FGP:[id number]    Acct: [de-identified]     Room: 0322/0322-02  Admit date: 7/29/2020  Today's date: 08/03/20  Number of days in the hospital: 5    SUBJECTIVE     Admitting Diagnosis:   CC: Dizziness     Patient seen and examined in her room. Patient continues to have dizziness. She is tripping when she is walking due to dizziness. She is at high risk for fall. Afebrile hemodynamically stable. Labs reviewed WBC trending up 12.4    Echo reviewed EF 52%. Grade 1 diastolic dysfunction. Monitor rate concentric LVH. MRI brain: Left mastoid effusion and small meningioma left frontal convexity 5 x 10 mm. No acute intracranial abnormality. Labs reviewed  Fasting blood glucose 232, starting on home dose metformin    Patient may need to go to SNF before she can go home due to high risk for fall    BRIEF HISTORY         69 y.o. female presented to the ER for evaluation of dizziness and nausea for last 3 days  as recommended by Dr. Cummings Edu her office phone visit. Patient states that she is not able to get out of her bed for the last 3 days. Ford Potter has a spinning sensation when she tries to get out of bed.  Patient describes dizziness as room spinning, she states that when she gets up she nearly falls over and has to hold onto the wall and then immediately needs to sit back down, however when she does stand she feels the room is spinning. Luna Aragon is h/o nausea and tinnitus but no vomiting.  Patient feels weak. Patient states this has never happened before. Thomas Olivares denies any syncopal episode. Patient tried over-the-counter motion sickness medication but did not get any relief.   Patient has history of A. fib, on Eliquis.  History of meclizine  25 mg Oral TID    [Held by provider] amitriptyline  50 mg Oral Nightly    amLODIPine  10 mg Oral Daily    atorvastatin  80 mg Oral Nightly    apixaban  5 mg Oral BID    furosemide  20 mg Oral Daily    metoprolol tartrate  25 mg Oral BID    oxybutynin  10 mg Oral Daily    pantoprazole  40 mg Oral QAM AC    sodium chloride flush  10 mL Intravenous 2 times per day    insulin lispro  0-6 Units Subcutaneous TID WC    insulin lispro  0-3 Units Subcutaneous Nightly     Continuous Infusions:    dextrose       PRN Medicationssodium chloride flush, 10 mL, PRN  sodium chloride flush, 10 mL, PRN  potassium chloride, 40 mEq, PRN    Or  potassium alternative oral replacement, 40 mEq, PRN    Or  potassium chloride, 10 mEq, PRN  magnesium sulfate, 1 g, PRN  acetaminophen, 650 mg, Q6H PRN    Or  acetaminophen, 650 mg, Q6H PRN  polyethylene glycol, 17 g, Daily PRN  promethazine, 12.5 mg, Q6H PRN    Or  ondansetron, 4 mg, Q6H PRN  nicotine, 1 patch, Daily PRN  glucose, 15 g, PRN  dextrose, 12.5 g, PRN  glucagon (rDNA), 1 mg, PRN  dextrose, 100 mL/hr, PRN        Diagnostic Labs:  CBC:   Recent Labs     08/01/20  0834 08/02/20  0438 08/03/20  0616   WBC 7.0 9.1 12.4*   RBC 4.80 4.56 4.55   HGB 13.7 13.5 13.4   HCT 43.7 41.5 42.7   MCV 91.0 91.0 93.8   RDW 14.2 14.2 14.5*    223 217     BMP:   Recent Labs     08/01/20  0834 08/02/20  0438 08/03/20  0616    135 139   K 4.1 4.6 4.0    98 103   CO2 25 24 23   BUN 18 23 24*   CREATININE 0.85 0.90 0.90        ASSESSMENT & PLAN   1. Vertigo due to BPPV: No clinical signs of acute mastoiditis, ENT ruled out mastoiditis. Discontinued antibiotic steroid and Afrin nasal spray. Continue Antivert 25 mg thrice daily. Appreciate ENT recommendations. Amitriptyline  held. Neurology recommends outpatient vestibular rehab. Vestibular physical therapy outpatient.   ENT recommends following up outpatient for VNG if patient does not respond to vestibular rehab.  2. Chronic diastolic CHF. Continue Lopressor 25 mg BID and Lasix 20 mg daily  3. Paroxysmal A. Fib: Rate controlled. Continue Eliquis 5 mg twice daily and Lopressor 25 mg twice daily. 4. Type 2 diabetes mellitus: Fasting blood glucose 232. A1c 7/30/2020: 7.7. Sliding scale insulin. Continue home dose metformin. Hypoglycemia protocol. 5. HEMALATHA: BiPAP at night. 6. Morbid obesity. Counseled   7. Essential hypertension in control. Continue Lopressor 25 mg twice daily  8. CAD s/p stent placement: 2016 and 2018. Continue aspirin 81 mg, on Lopressor and Lipitor. Echo: EF 75% grade 1 diastolic dysfunction    DVT ppx : Eliquis for A. fib  GI ppx: Protonix 40 mg daily    PT/OT: Consulted  Discharge Planning / SW: Home, independently    Maribel Spring MD  Internal Medicine Resident, PGY-2  [facility]; [place], [place]  8/3/2020, 9:03 AM      Attending Physician Statement  I have discussed the case, including pertinent history and exam findings with the resident and the team.  I have seen and examined the patient and the key elements of the encounter have been performed by me. I agree with the assessment, plan and orders as documented by the resident.       Rafi Arellano MD   Attending Physician, Internal Medicine Residency Program  8/3/2020, 1:37 PM

## 2020-08-03 NOTE — PLAN OF CARE
Problem: Pain:  Goal: Pain level will decrease  Description: Pain level will decrease  Outcome: Ongoing  Goal: Control of acute pain  Description: Control of acute pain  Outcome: Ongoing  Goal: Control of chronic pain  Description: Control of chronic pain  Outcome: Ongoing     Problem: Skin Integrity:  Goal: Will show no infection signs and symptoms  Description: Will show no infection signs and symptoms  Outcome: Ongoing  Goal: Absence of new skin breakdown  Description: Absence of new skin breakdown  Outcome: Ongoing     Problem: Falls - Risk of:  Goal: Will remain free from falls  Description: Will remain free from falls  Outcome: Ongoing  Goal: Absence of physical injury  Description: Absence of physical injury  Outcome: Ongoing     Problem: Musculor/Skeletal Functional Status  Goal: Highest potential functional level  Outcome: Ongoing

## 2020-08-03 NOTE — PLAN OF CARE
Problem: RESPIRATORY  Intervention: Respiratory assessment  Note: NONINVASIVE VENTILATION    PROVIDE OPTIMAL VENTILATION/ACCEPTABLE SPO2   IMPLEMENT NONINVASIVE VENTILATION PROTOCOL   MAINTAIN ACCEPTABLE SPO2   ASSESS SKIN INTEGRITY/BREAKDOWN SCORE   PATIENT EDUCATION AS NEEDED   BIPAP AS NEEDED

## 2020-08-03 NOTE — DISCHARGE INSTR - COC
Continuity of Care Form    Patient Name: Ozzy Ortega   :  1950  MRN:  [id number]    Admit date:  2020  Discharge date: 20    Code Status Order: Full Code   Advance Directives:   [facility] Documentation     Date/Time Healthcare Directive Type of Healthcare Directive Copy in [facility] [address] Agent's Name Healthcare Agent's Phone Number    20 0032  No, patient does not have an advance directive for healthcare treatment -- -- -- -- --          Admitting Physician:  Rafi Matthews MD  PCP: Estevan Olivas MD    Discharging Nurse: Stephanie Dia. [address] Unit/Room#: 5159/7349-28  Discharging Unit Phone Number: [phone]    Emergency Contact:   Extended Emergency Contact Information  Primary Emergency Contact: Baltazar Singer  Address: N/A   [address] Phone: [phone]  Work Phone: [phone]  Mobile Phone: [phone]  Relation: Child  Hearing or visual needs: None  Other needs: None  Preferred language: English   needed? No  Secondary Emergency Contact: José Arzate  Address: N/A           [address] [place] [address] Phone: [phone]  Work Phone: [phone]  Mobile Phone: [phone]  Relation: Brother/Sister  Hearing or visual needs: None  Other needs: None  Preferred language: English   needed?  No    Past Surgical History:  Past Surgical History:   Procedure Laterality Date    CONTROL OF NOSE BLEED LIGATION & NASAL ENDOSCOPY Right 2019    turbinoplasty    CORONARY ANGIOPLASTY WITH STENT PLACEMENT  2015    2 stents/ Dr. Yuriy Paz, [facility] ARTHROSCOPY Left     NASAL SINUS SURGERY Right 2019    NASAL ENDOSCOPIC, NASAL CAUTERY performed by Snow Burch MD at [address] [place] [address] Right 2016    TOTAL KNEE ARTHROPLASTY Left 2016    TURBINOPLASTIES Right 2019    TURBINOPLASTY performed by [facility], MD at [address]       Immunization History:   Immunization History   Administered Date(s) Administered    Pneumococcal Conjugate 13-valent Ora Morita) 01/18/2018    Pneumococcal Polysaccharide ([id number]) 03/16/2017    Tdap (Boostrix, Adacel) 09/03/2015       Active Problems:  Patient Active Problem List   Diagnosis Code    Essential hypertension I10    Obstructive sleep apnea syndrome G47.33    Morbid obesity due to excess calories ([facility]) E66.01    Arthritis of knee M17.10    Cardiac murmur R01.1    Type 2 diabetes mellitus with diabetic polyneuropathy, without long-term current use of insulin ([facility]) E11.42    Status post right knee replacement Z96.651    Coronary artery disease involving native coronary artery of native heart with unstable angina pectoris ([facility]) I25.110    Meningioma ([facility]) D32.9    Chronic diastolic congestive heart failure ([facility]) I50.32    Chest pain R07.9    First degree AV block I44.0    Chronic daily headache R51    Eczema L30.9    Unstable angina ([facility]) I20.0    Chronic atrial fibrillation I48.20    Vertigo R42    Dizziness R42    Migraine without aura and without status migrainosus, not intractable G43.009    Acute mastoiditis of left side H70.002       Isolation/Infection:   Isolation          No Isolation        Patient Infection Status     None to display          Nurse Assessment:  Last Vital Signs: /83   Pulse 75   Temp 97.4 °F (36.3 °C)   Resp 14   Ht 5' 9\" (1.753 m)   Wt (!) 310 lb (140.6 kg)   SpO2 98%   BMI 45.78 kg/m²     Last documented pain score (0-10 scale): Pain Level: 3  Last Weight:   Wt Readings from Last 1 Encounters:   08/02/20 (!) 310 lb (140.6 kg)     Mental Status:  oriented and alert    IV Access:  - None    Nursing Mobility/ADLs:  Walking   Independent  Transfer  Independent  Bathing  Independent  Dressing  Independent  Toileting  Assisted  Feeding  Independent  Med Admin  Assisted  Med Delivery   whole    Wound Care

## 2020-08-03 NOTE — PROGRESS NOTES
Physical Therapy  Facility/Department: [facility] RENAL//MED SURG  Daily Treatment Note  NAME: Tressa Francisco  : 1950  MRN: [id number]    Chief Complaint   Patient presents with    Headache    Dizziness       Date of Service: 8/3/2020    Discharge Recommendations:  Further therapy recommended at discharge for vestibular deficits. PT Equipment Recommendations  Equipment Needed: Yes  Adolfo Peek: Rollator (4 Wheeled)  Other: Pt would benefit from Rollator d/t dizziness and fatigue, pt reporting difficulty ambulating to her apartment from the car at baseline, pt reports she normally takes seated rest breaks but the apartment complex removed the chairs in the hallway d/t the Covid 19 virus    Assessment   Body structures, Functions, Activity limitations: Decreased functional mobility ; Decreased endurance;Decreased strength;Vestibular Impairment  Assessment: Pt grossly CGA to SBA. Pt amb 300' RW CGA to SBA, 3x 20sec standing rest break d/t fatigue. Pt would benefit from continued acute PT. Prognosis: Good  PT Education: Plan of Care;PT Role  REQUIRES PT FOLLOW UP: Yes  Activity Tolerance  Activity Tolerance: Patient limited by fatigue;Patient limited by endurance  Activity Tolerance: dizziness requires ~2min break when moving supine-to-sit or sit-to-supine. Patient Diagnosis(es): The encounter diagnosis was Vertigo.      has a past medical history of HOSESIN (acute kidney injury) ([address] Arthritis, Chronic diastolic congestive heart failure ([address] Chronic kidney disease, Coronary artery disease involving native coronary artery of native heart without angina pectoris, Epistaxis, GERD (gastroesophageal reflux disease), Headache, Hyperlipidemia, Hypertension, MI (myocardial infarction) ([address] Neuromuscular disorder ([address] Osteoarthritis, Patient in clinical research study, Sleep apnea, Snores, Type II or unspecified type diabetes mellitus without mention of complication, not stated as uncontrolled, Use of cane as

## 2020-08-04 VITALS
HEIGHT: 69 IN | RESPIRATION RATE: 18 BRPM | SYSTOLIC BLOOD PRESSURE: 115 MMHG | WEIGHT: 293 LBS | HEART RATE: 66 BPM | DIASTOLIC BLOOD PRESSURE: 61 MMHG | OXYGEN SATURATION: 100 % | TEMPERATURE: 97.9 F | BODY MASS INDEX: 43.4 KG/M2

## 2020-08-04 LAB
ABSOLUTE EOS #: 0.19 K/UL (ref 0–0.44)
ABSOLUTE IMMATURE GRANULOCYTE: <0.03 K/UL (ref 0–0.3)
ABSOLUTE LYMPH #: 2.88 K/UL (ref 1.1–3.7)
ABSOLUTE MONO #: 0.7 K/UL (ref 0.1–1.2)
ANION GAP SERPL CALCULATED.3IONS-SCNC: 11 MMOL/L (ref 9–17)
BASOPHILS # BLD: 0 % (ref 0–2)
BASOPHILS ABSOLUTE: 0.03 K/UL (ref 0–0.2)
BUN BLDV-MCNC: 26 MG/DL (ref 8–23)
BUN/CREAT BLD: ABNORMAL (ref 9–20)
CALCIUM SERPL-MCNC: 8.9 MG/DL (ref 8.6–10.4)
CHLORIDE BLD-SCNC: 105 MMOL/L (ref 98–107)
CO2: 26 MMOL/L (ref 20–31)
CREAT SERPL-MCNC: 0.93 MG/DL (ref 0.5–0.9)
DIFFERENTIAL TYPE: ABNORMAL
EOSINOPHILS RELATIVE PERCENT: 2 % (ref 1–4)
GFR AFRICAN AMERICAN: >60 ML/MIN
GFR NON-AFRICAN AMERICAN: 60 ML/MIN
GFR SERPL CREATININE-BSD FRML MDRD: ABNORMAL ML/MIN/{1.73_M2}
GFR SERPL CREATININE-BSD FRML MDRD: ABNORMAL ML/MIN/{1.73_M2}
GLUCOSE BLD-MCNC: 103 MG/DL (ref 65–105)
GLUCOSE BLD-MCNC: 155 MG/DL (ref 65–105)
GLUCOSE BLD-MCNC: 169 MG/DL (ref 70–99)
GLUCOSE BLD-MCNC: 176 MG/DL (ref 65–105)
HCT VFR BLD CALC: 41.9 % (ref 36.3–47.1)
HEMOGLOBIN: 12.8 G/DL (ref 11.9–15.1)
IMMATURE GRANULOCYTES: 0 %
LYMPHOCYTES # BLD: 32 % (ref 24–43)
MCH RBC QN AUTO: 28.6 PG (ref 25.2–33.5)
MCHC RBC AUTO-ENTMCNC: 30.5 G/DL (ref 28.4–34.8)
MCV RBC AUTO: 93.7 FL (ref 82.6–102.9)
MONOCYTES # BLD: 8 % (ref 3–12)
NRBC AUTOMATED: 0 PER 100 WBC
PDW BLD-RTO: 14.5 % (ref 11.8–14.4)
PLATELET # BLD: 179 K/UL (ref 138–453)
PLATELET ESTIMATE: ABNORMAL
PMV BLD AUTO: 9.7 FL (ref 8.1–13.5)
POTASSIUM SERPL-SCNC: 3.8 MMOL/L (ref 3.7–5.3)
RBC # BLD: 4.47 M/UL (ref 3.95–5.11)
RBC # BLD: ABNORMAL 10*6/UL
SEG NEUTROPHILS: 58 % (ref 36–65)
SEGMENTED NEUTROPHILS ABSOLUTE COUNT: 5.16 K/UL (ref 1.5–8.1)
SODIUM BLD-SCNC: 142 MMOL/L (ref 135–144)
WBC # BLD: 9 K/UL (ref 3.5–11.3)
WBC # BLD: ABNORMAL 10*3/UL

## 2020-08-04 PROCEDURE — 80048 BASIC METABOLIC PNL TOTAL CA: CPT

## 2020-08-04 PROCEDURE — 97535 SELF CARE MNGMENT TRAINING: CPT

## 2020-08-04 PROCEDURE — 6370000000 HC RX 637 (ALT 250 FOR IP): Performed by: NURSE PRACTITIONER

## 2020-08-04 PROCEDURE — 36415 COLL VENOUS BLD VENIPUNCTURE: CPT

## 2020-08-04 PROCEDURE — 97530 THERAPEUTIC ACTIVITIES: CPT

## 2020-08-04 PROCEDURE — 6370000000 HC RX 637 (ALT 250 FOR IP): Performed by: STUDENT IN AN ORGANIZED HEALTH CARE EDUCATION/TRAINING PROGRAM

## 2020-08-04 PROCEDURE — 85025 COMPLETE CBC W/AUTO DIFF WBC: CPT

## 2020-08-04 PROCEDURE — 2580000003 HC RX 258: Performed by: NURSE PRACTITIONER

## 2020-08-04 PROCEDURE — 6360000002 HC RX W HCPCS: Performed by: NURSE PRACTITIONER

## 2020-08-04 PROCEDURE — 82947 ASSAY GLUCOSE BLOOD QUANT: CPT

## 2020-08-04 PROCEDURE — 6370000000 HC RX 637 (ALT 250 FOR IP): Performed by: PSYCHIATRY & NEUROLOGY

## 2020-08-04 PROCEDURE — 99239 HOSP IP/OBS DSCHRG MGMT >30: CPT | Performed by: INTERNAL MEDICINE

## 2020-08-04 PROCEDURE — 96376 TX/PRO/DX INJ SAME DRUG ADON: CPT

## 2020-08-04 RX ORDER — MECLIZINE HYDROCHLORIDE 25 MG/1
25 TABLET ORAL 3 TIMES DAILY
Qty: 30 TABLET | Refills: 0 | Status: CANCELLED | OUTPATIENT
Start: 2020-08-04 | End: 2020-08-14

## 2020-08-04 RX ADMIN — OXYBUTYNIN CHLORIDE 10 MG: 10 TABLET, EXTENDED RELEASE ORAL at 10:40

## 2020-08-04 RX ADMIN — FUROSEMIDE 20 MG: 20 TABLET ORAL at 10:38

## 2020-08-04 RX ADMIN — ONDANSETRON 4 MG: 2 INJECTION INTRAMUSCULAR; INTRAVENOUS at 08:42

## 2020-08-04 RX ADMIN — METFORMIN HYDROCHLORIDE 1000 MG: 500 TABLET ORAL at 10:38

## 2020-08-04 RX ADMIN — MECLIZINE HYDROCHLORIDE 25 MG: 12.5 TABLET, FILM COATED ORAL at 10:39

## 2020-08-04 RX ADMIN — PANTOPRAZOLE SODIUM 40 MG: 40 TABLET, DELAYED RELEASE ORAL at 06:06

## 2020-08-04 RX ADMIN — Medication 10 ML: at 08:42

## 2020-08-04 RX ADMIN — METOPROLOL TARTRATE 25 MG: 25 TABLET ORAL at 10:39

## 2020-08-04 RX ADMIN — MECLIZINE HYDROCHLORIDE 25 MG: 12.5 TABLET, FILM COATED ORAL at 15:48

## 2020-08-04 RX ADMIN — AMLODIPINE BESYLATE 10 MG: 10 TABLET ORAL at 10:40

## 2020-08-04 RX ADMIN — APIXABAN 5 MG: 5 TABLET, FILM COATED ORAL at 10:39

## 2020-08-04 NOTE — PROGRESS NOTES
& NASAL ENDO (Right, 04/08/2019); Nasal sinus surgery (Right, 4/8/2019); and Turbinoplasties (Right, 4/8/2019). Restrictions  Restrictions/Precautions  Restrictions/Precautions: General Precautions, Fall Risk  Required Braces or Orthoses?: No  Required Braces or Orthoses  Other: Abdominal Binder    Subjective   General  Chart Reviewed: Yes  Family / Caregiver Present: No  Subjective  Subjective: RN and pt agreeable to PT. Pt alert in bed upon arrival. Pt c/o vertigo today, but agreed to perform LE/UE exercises in bed.   Pain Screening  Patient Currently in Pain: Other (comment)(no pain reported)  Vital Signs  Patient Currently in Pain: Other (comment)(no pain reported)       Orientation  Orientation  Overall Orientation Status: Within Normal Limits    Objective    Exercises  Straight Leg Raise: Supine: BLE x15  Heelslides: Supine: BLE x15  Hip Abduction: Supine: BLE x15  Ankle Pumps: Supine: BLE x15  Shoulder Active Range of Motion: BUE x10       Goals  Short term goals  Time Frame for Short term goals: 10 visits  Short term goal 1: transfers with SBA  Short term goal 2: amb 150 ft with a RW x SBA  Short term goal 3: ascend/descend 4 steps with SBA  Short term goal 4: exercise program x SBA  Patient Goals   Patient goals : Rerurn home    Plan    Plan  Times per week: 5-6x wk  Current Treatment Recommendations: Strengthening, Gait Training, Stair training, Balance Training, Functional Mobility Training, Endurance Training, Safety Education & Training  Safety Devices  Type of devices: Call light within reach, Nurse notified, Left in bed, All fall risk precautions in place, Patient at risk for falls  Restraints  Initially in place: No     Therapy Time   Individual Concurrent Group Co-treatment   Time In 1409         Time Out 1425         Minutes 16         Timed Code Treatment Minutes: 230 Joanne Killian PTA

## 2020-08-04 NOTE — PLAN OF CARE
Problem: Pain:  Goal: Pain level will decrease  Description: Pain level will decrease  8/4/2020 1703 by Alton Castro RN  Outcome: Ongoing  8/4/2020 0437 by Tonia Nassar RN  Outcome: Ongoing  Goal: Control of acute pain  Description: Control of acute pain  8/4/2020 1703 by Alton Castro RN  Outcome: Ongoing  8/4/2020 0437 by Tonia Nassar RN  Outcome: Ongoing  Goal: Control of chronic pain  Description: Control of chronic pain  8/4/2020 1703 by Alton Castro RN  Outcome: Ongoing  8/4/2020 0437 by Tonia Nassar RN  Outcome: Ongoing     Problem: Skin Integrity:  Goal: Will show no infection signs and symptoms  Description: Will show no infection signs and symptoms  8/4/2020 1703 by Alton Castro RN  Outcome: Ongoing  8/4/2020 0437 by Tonia Nassar RN  Outcome: Ongoing  Goal: Absence of new skin breakdown  Description: Absence of new skin breakdown  8/4/2020 1703 by Alton Castro RN  Outcome: Ongoing  8/4/2020 0437 by Tonia Nassar RN  Outcome: Ongoing     Problem: Falls - Risk of:  Goal: Will remain free from falls  Description: Will remain free from falls  8/4/2020 1703 by Alton Castro RN  Outcome: Ongoing  8/4/2020 0437 by Tonia Nassar RN  Outcome: Ongoing  Goal: Absence of physical injury  Description: Absence of physical injury  8/4/2020 1703 by Alton Castro RN  Outcome: Ongoing  8/4/2020 0437 by Tonia Nassar RN  Outcome: Ongoing

## 2020-08-04 NOTE — PROGRESS NOTES
(Right, 4/8/2019). Restrictions  Restrictions/Precautions  Restrictions/Precautions: General Precautions, Fall Risk  Required Braces or Orthoses?: No  Required Braces or Orthoses  Other: Abdominal Binder  Position Activity Restriction  Sternal Precautions: 10# Lifting Restrictions  Other position/activity restrictions: Up with assist     Subjective   General  Patient assessed for rehabilitation services?: Yes  Family / Caregiver Present: No  Diagnosis: Dizziness, nausea, vasodepressive syndrome  General Comment  Comments: RN ok'd pt for OT tx this date. Pt agreeable and cooperative throughout  Vital Signs  Patient Currently in Pain: Denies  Oxygen Therapy  O2 Device: None (Room air)     Objective    ADL  Grooming: Setup; Increased time to complete;Stand by assistance(washed hands and face while standing sinkside with SBA for safety)  UE Dressing: Stand by assistance; Increased time to complete;Setup(doffed/donned personal shirt/nightgown with SBA and setup)  Toileting: Increased time to complete;Contact guard assistance;Setup(pt completed toileting with CGA and setup; VCs to wait for therapist prior to standing d/t c/o dizziness)  Additional Comments: Pt stood sinkside for grooming and hand hygiene this date. Pt requires VCs for safety awareness and to wait for therapist prior to standing from bed and toilet. Pt completed UB dressing while seated EOB this date  Instrumental ADL's  Instrumental ADLs: Yes  Light Housekeeping  Light Housekeeping Level of Assistance: Contact guard assistance  Light Housekeeping: Pt assisted with making bed this date with CGA for safety d/t dizziness.  Pt demo good dynamic standing with no gross LOB    Balance  Sitting Balance: Stand by assistance  Standing Balance: Contact guard assistance  Standing Balance  Time: ~5 minutes  Activity: sinkside and during IADL  Functional Mobility  Functional - Mobility Device: Rolling Walker  Activity: To/from bathroom  Assist Level: Contact guard assistance  Functional Mobility Comments: CGA throughout for safety. Pt completed part of functional mobility with RW and part with no device use.  Pt demo better balance with use of RW  Toilet Transfers  Toilet - Technique: Stand step  Equipment Used: Grab bars  Toilet Transfer: Contact guard assistance  Toilet Transfers Comments: pt requires VCs for safety throughout transfers  Bed mobility  Supine to Sit: Stand by assistance  Sit to Supine: Stand by assistance  Scooting: Stand by assistance  Comment: increased time d/t mild dizziness  Transfers  Sit to stand: Contact guard assistance  Stand to sit: Contact guard assistance                      Cognition  Overall Cognitive Status: Exceptions  Safety Judgement: Decreased awareness of need for assistance;Decreased awareness of need for safety  Insights: Decreased awareness of deficits                                        Plan   Plan  Times per week: 3-4x  Current Treatment Recommendations: Balance Training, Functional Mobility Training, Endurance Training, Safety Education & Training, Self-Care / ADL, Home Management Training, Equipment Evaluation, Education, & procurement, Patient/Caregiver Education & Training    AM-PAC Score        AM-[facility] Inpatient Daily Activity Raw Score: 18 (08/04/20 1326)  AM-PAC Inpatient ADL T-Scale Score : 38.66 (08/04/20 1326)  ADL Inpatient CMS 0-100% Score: 46.65 (08/04/20 1326)  ADL Inpatient CMS G-Code Modifier : CK (08/04/20 1326)    Goals  Short term goals  Time Frame for Short term goals: Pt will by discharge  Short term goal 1: demo supine<>sit transfer to EOB with SUP, on first attempt, with use of railings PRN  Short term goal 2: demo ADL UB bathing/dressing activity with increased time and mod I  Short term goal 3: demo ADL LB bathing/dressing activity with increased time, use of sock-aid/reacher, and CGA  Short term goal 4: demo good safety awareness during func mob around room using RW and mod I       Therapy Time Individual Concurrent Group Co-treatment   Time In 1017         Time Out 1041         Minutes 24         Timed Code Treatment Minutes: 23 Minutes       Bryanna Cortes, OTR/L

## 2020-08-04 NOTE — CARE COORDINATION
TRANSITIONAL CARE PLANNING/ 2 Rehab [facility] Day: 6    Reason for Admission: Dizziness [R42]  Dizziness [R42]  Vertigo [R42]     Treatment Plan of Care: supportive care, eliquis for A fib, PT/OT    Tests/Procedures still needed: none ordered at this time    Barriers to Discharge: accepting facility within network. Call from [place] this am reports can not accept as they are not in network and patient does not have out of network benefits. Call placed to [facility]. Reviewing case and bed availability. [facility] can accept but do not have female bed available at this time. Referral made to [facility]    Readmission Risk              Risk of Unplanned Readmission:        14       Patient goals/Treatment Preferences/Transitional Plan: Want to be able to go to rehab and start process of getting better. Referrals Made: [facility] RANGEL     Follow Up needed:     [facility] looking over case, have bed availability. Vernell Parikh calls back stating that they can accept patient and contacted [facility] to inquire about the already given auth # [id number] for [facility] [place]. Patient refuses to go to [facility] at this time hence subsequent referrals to [facility] and [employer] [employer] Tammie states [facility] has advised she needs to start another pre cert for their facility. SHAKA contacted Etelvina SWENSON to advise of sameera. Given numbers at [facility] to call to expedite precert. Zeke Keenan @[phone]  left. Taylor Bradford @ [phone]  left    Received call from [facility]- Has received precert. Attempted to arrange [facility] TP. Unable to reach person on phone- automated \"tree\" options continued to loop around no matter with option was chosen. [facility] call.  ETA 8pm.     Tammie called with ETA, All dc papers faxed to [facility] for [facility]

## 2020-08-04 NOTE — PROGRESS NOTES
nearly falls over and has to hold onto the wall and then immediately needs to sit back down, however when she does stand she feels the room is spinning.  There is h/o nausea and tinnitus but no vomiting.  Patient feels weak. Patient states this has never happened before. Brendan Oates denies any syncopal episode. Patient tried over-the-counter motion sickness medication but did not get any relief. Patient has history of A. fib, on Eliquis.  History of hypertension(on amlodipine and Lopressor), diastolic CHF(on Lasix) with ejection fraction more than 65%(ECHO ) . No history of recent viral URI symptoms, denies any head trauma, denied any previous history of vertigo.  Patient has history of meningioma and goes yearly MRI to monitor for any growth.  History of DM type II.  Previous MI on Eliquis, atrial fibrillation. MRI appears to be in  showed a stable left frontal meningioma. Recent MRI done on 2020 showing stable left frontal meningioma and  Left mastoid effusion.   Patient recently took antibiotics for right lower extremity Cellulitis. In ER chest Xray show Cardiomegaly. CT Head wo contrast: No acute intracranial findings. CTA Head Neck w contrast: No acute arterial abnormality. B sugar: 176, PTT 31.9. Amitriptyline and amlodipine were held. Orthostatics were negative. OBJECTIVE     Vital Signs:  BP (!) 120/93   Pulse 84   Temp 97.9 °F (36.6 °C)   Resp 18   Ht 5' 9\" (1.753 m)   Wt (!) 307 lb (139.3 kg)   SpO2 96%   BMI 45.34 kg/m²     Temp (24hrs), Av.8 °F (36.6 °C), Min:97.6 °F (36.4 °C), Max:97.9 °F (36.6 °C)    No intake/output data recorded. Physical Exam:  Physical Exam  Constitutional:       Appearance: Normal appearance. Neck:      Musculoskeletal: Normal range of motion and neck supple. No neck rigidity or muscular tenderness. Cardiovascular:      Rate and Rhythm: Normal rate and regular rhythm. Pulses: Normal pulses. Pulmonary:      Breath sounds:  No wheezing. Abdominal:      General: Bowel sounds are normal. There is no distension. Palpations: Abdomen is soft. Tenderness: There is no abdominal tenderness. Musculoskeletal: Normal range of motion. General: No swelling, tenderness, deformity or signs of injury. Right lower leg: No edema. Left lower leg: No edema. Lymphadenopathy:      Cervical: No cervical adenopathy. Skin:     General: Skin is warm and dry. Coloration: Skin is not jaundiced or pale. Findings: No erythema, lesion or rash. Neurological:      General: No focal deficit present. Mental Status: She is alert and oriented to person, place, and time.    Psychiatric:         Mood and Affect: Mood normal.       Medications:  Scheduled Medications:    metFORMIN  1,000 mg Oral BID    meclizine  25 mg Oral TID    [Held by provider] amitriptyline  50 mg Oral Nightly    amLODIPine  10 mg Oral Daily    atorvastatin  80 mg Oral Nightly    apixaban  5 mg Oral BID    furosemide  20 mg Oral Daily    metoprolol tartrate  25 mg Oral BID    oxybutynin  10 mg Oral Daily    pantoprazole  40 mg Oral QAM AC    sodium chloride flush  10 mL Intravenous 2 times per day    insulin lispro  0-6 Units Subcutaneous TID WC    insulin lispro  0-3 Units Subcutaneous Nightly     Continuous Infusions:    dextrose       PRN Medicationssodium chloride flush, 10 mL, PRN  sodium chloride flush, 10 mL, PRN  potassium chloride, 40 mEq, PRN    Or  potassium alternative oral replacement, 40 mEq, PRN    Or  potassium chloride, 10 mEq, PRN  magnesium sulfate, 1 g, PRN  acetaminophen, 650 mg, Q6H PRN    Or  acetaminophen, 650 mg, Q6H PRN  polyethylene glycol, 17 g, Daily PRN  promethazine, 12.5 mg, Q6H PRN    Or  ondansetron, 4 mg, Q6H PRN  nicotine, 1 patch, Daily PRN  glucose, 15 g, PRN  dextrose, 12.5 g, PRN  glucagon (rDNA), 1 mg, PRN  dextrose, 100 mL/hr, PRN        Diagnostic Labs:  CBC:   Recent Labs     08/02/20  6785 08/03/20  0616 08/04/20  1004   WBC 9.1 12.4* 9.0   RBC 4.56 4.55 4.47   HGB 13.5 13.4 12.8   HCT 41.5 42.7 41.9   MCV 91.0 93.8 93.7   RDW 14.2 14.5* 14.5*    217 179     BMP:   Recent Labs     08/02/20  0438 08/03/20  0616 08/04/20  1004    139 142   K 4.6 4.0 3.8   CL 98 103 105   CO2 24 23 26   BUN 23 24* 26*   CREATININE 0.90 0.90 0.93*     BNP: No results for input(s): BNP in the last 72 hours. PT/INR: No results for input(s): PROTIME, INR in the last 72 hours. APTT: No results for input(s): APTT in the last 72 hours. CARDIAC ENZYMES: No results for input(s): CKMB, CKMBINDEX, TROPONINI in the last 72 hours. Invalid input(s): CKTOTAL;3  FASTING LIPID PANEL:  Lab Results   Component Value Date    CHOL 130 02/20/2020    HDL 56 02/20/2020    TRIG 163 (H) 02/20/2020     LIVER PROFILE: No results for input(s): AST, ALT, ALB, BILIDIR, BILITOT, ALKPHOS in the last 72 hours. MICROBIOLOGY:   Lab Results   Component Value Date/Time    CULTURE NO GROWTH 3 DAYS 11/02/2016 04:45 PM    CULTURE  11/02/2016 04:45 PM     [employer] [address] [address] [phone]       Imaging:    Xr Chest (2 Vw)    Result Date: 7/29/2020  Stable chest with cardiomegaly. No acute cardiopulmonary process. Ct Head Wo Contrast    Result Date: 7/29/2020  No acute intracranial findings. Cta Head Neck W Contrast    Result Date: 7/29/2020  No acute arterial abnormality or hemodynamically significant arterial stenosis in the head or neck. Mri Brain W Wo Contrast    Result Date: 7/31/2020  1. No acute intracranial abnormality. No acute infarct. 2. There is a left mastoid effusion with associated enhancement. Recommend clinical correlation for mastoiditis. 3. No abnormality seen within the cerebellopontine angle cisterns or internal auditory canals. 4. Small extra-axial enhancing lesion along the left frontal convexity measuring 5 x 10 mm. This may represent a small meningioma.        ASSESSMENT & PLAN     ASSESSMENT / PLAN:     1. Vertigo due to BPPV: Consulted with ENT and ruled out acute mastoiditis. No clinical signs of acute mastoiditis,  Seen. Discontinue antibiotics, steroids and nasal spray. Continue Antivert 25 mg thrice daily. Amitriptyline is still on hold. Neurology recommended vestibular rehabilitation outpatient and for VNG if not responded by vestibular rehab.    2. Chronic diastolic CHF. Stable Continue Lopressor 25 mg BID and Lasix 20 mg daily  3. Paroxysmal A. Fib: Rate controlled. Continue Eliquis 5 mg twice daily and Lopressor 25 mg twice daily. 4. Type 2 diabetes mellitus:. A1c 7/30/2020: 7.7. Sliding scale insulin. Continue home dose metformin. Hypoglycemia protocol. 5. HEMALATHA: Continue BiPAP at night. 6. Morbid obesity. Counselled  7. Essential hypertension in control. Continue Lopressor 25 mg twice daily  8. CAD s/p stent placement: 2016 and 2018. Continue aspirin 81 mg, on Lopressor and Lipitor. Echo: EF 60% grade 1 diastolic dysfunction       DVT ppx : Continue abixaban  GI ppx:Continue Protnix 40 mg daily    PT/OT: Consulted  Discharge Planning / Soraya Galeas: Rashi Campuzano MD  Internal Medicine Resident, PGY [address];  [place], [place]  8/4/2020, 12:41 PM

## 2020-08-04 NOTE — ADT AUTH CERT
Utilization Reviews          2020 by Lui Lim RN         Review Status  Review Entered    In Primary  2020 12:37        Criteria Review    2020        ==PT NOTE     Transfers  Sit to Stand: Stand by assistance  Stand to sit: Stand by assistance  Ambulation  Ambulation?: Yes  Ambulation 1  Surface: level tile  Device: Rolling Walker  Assistance: Stand by assistance  Gait Deviations: Increased CEE; Slow Ella;Decreased step length;Decreased step height  Distance: 30ft x 2  Comments: Seated rest break needed d/t fatigue and dizziness  Stairs/Curb     ===INTERNAL MED NOTE     Vital Signs:  /81   Pulse 73   Temp 97.5 °F (36.4 °C)   Resp 16   Ht 5' 9\" (1.753 m)   Wt (!) 301 lb (136.5 kg)   SpO2 100%   BMI 44.45 kg/m²     Temp (24hrs), Av.7 °F (36.5 °C), Min:97.3 °F (36.3 °C), Max:97.9 °F (36.6 °C)     No intake/output data recorded.     Physical Exam:     Constitutional: This is a well developed, well nourished, Greater than 40 - Morbid Obesity / Extreme Obesity / Lyubov Caruso y.o. year old female who is alert, oriented, cooperative and in no apparent distress.  Head:normocephalic and atraumatic.    EENT: Pt has hearing loss on left ear.  Patient gets dizzy if turned to the right side. Neck: Supple without thyromegaly. Respiratory: Chest was symmetrical without dullness to percussion. Cardiovascular: Regular without murmur, clicks, gallops or rubs. Abdomen: Slightly rounded and soft without organomegaly. Musculoskeletal: Normal curvature of the spine. Pt unable to raise her legs against gravity.   Extremities:   lower extremity edema and tenderness present. no varicosities or erythema. Scar anabel b/l knee s/p knee replacement surgery (2016).    Skin:  Warm and dry.    Neurological/Psychiatric: The patient's general behavior, level of consciousness, thought content and emotional status is normal.          cefTRIAXone (ROCEPHIN) IV  2 g Intravenous Q12H    oxymetazoline  2 spray Each Nostril BID    predniSONE  30 mg Oral BID    meclizine  25 mg Oral TID    [Held by provider] amitriptyline  50 mg Oral Nightly    [Held by provider] amLODIPine  10 mg Oral Daily    atorvastatin  80 mg Oral Nightly    apixaban  5 mg Oral BID    furosemide  20 mg Oral Daily    metoprolol tartrate  25 mg Oral BID    oxybutynin  10 mg Oral Daily    pantoprazole  40 mg Oral QAM AC    sodium chloride flush  10 mL Intravenous 2 times per day    insulin lispro  0-6 Units Subcutaneous TID WC    insulin lispro  0-3 Units Subcutaneous Nightly            ASSESSMENT & PLAN       1. Vertigo: Possibly due to mastoditis, Pt on Rocephin and Antivert. Although could be supeimpossed Benign Paroxysmal Positional Vertigo, Past h/o Vasodepressive syndrome:   2.  Paroxysmal A. Fib: Resume Eliquis chong 5 mg, Lopressor 25 mg twice daily. 3.  CAD s/p stent placement: Stent placed in 2016 and 2018.  Patient not on aspirin or Plavix.  Aspirin 81 mg started.  Resumed Lopressor 25 mg, Lipitor 80 mg. Recommend ECHO. 4.  HEMALATHA: Patient uses BiPAP overnight. 5.  Type II DM: Blood sugar on admission was 154.  Takes metformin at home.  Continue low-dose sliding scale with hypoglycemia protocol. 6.  Essential hypertension: Started on home dose of Lopressor 25 mg twice daily, will hold Norvasc 10 mg and will monitor blood pressure.      DVT ppx: Eliquis  GI ppx: Protonix     PT/OT/SW: Working        ===NEUROLOGY NOTE  Over night: No any acute event. Patient denies any complaints.        Patient feels still dizzy when changing position.      On exam, she keeps her eyes mostly closed, and is holding her head in pain. She appears to have a left ruptured ear drum and erythematous surrounding IAC. HINTS with no nystagmus, a few beats b/l nystagmus on head impulse, and + skew on R eye.  Admittted to IM for vertigo              PA LETTER OF RECOMMENDATION by Eliud Esqueda RN         Review Status  Review Entered    In Primary 8/4/2020 12:28        Criteria Review            Summary of impressions and findings: vertigo, presyncope and dizziness         evaluation. Recurrent problem. OM suspected but MRI pending.                Neurology       1. Suspect peripheral vertigo assoc with ruptured ear drum on left. Possibly is       part of an atypical migraine. However she also describes some pre-syncopal       features, possibly this is a separate CHF issue.       2. Possible complicated migraine       3. Partial empty sella on CTH - low suspicion HA assoc with pseudotumor cerebri        4. Dyspnea on exertion - new, possible AOC CHF                PLAN:       Will obtain MRI B r/o central causes of vertigo       Orthostatics       F/u IM recs for suspected otitis media with rupture and dyspnea on exertion       Likely outpatient vestibular rehab       Continue prn meclizine or zofran symptomatic management.       Treat HA with migraine cocktail and re-evaluate in morning, will consider       further treatment       Will consider future need for LP with opening pressure       Outpatient f.u neurosurgery for meningioma yearly per their recommendations                                        Chart reviewed and VUR notified.               Cece Adhikari MD MPH       Physician [address], [facility]       Cell        [email or web address]

## 2020-08-04 NOTE — PLAN OF CARE
Problem: Pain:  Goal: Pain level will decrease  Description: Pain level will decrease  8/4/2020 0437 by Bárbara Tipton RN  Outcome: Ongoing  8/3/2020 1607 by Bushra Servin RN  Outcome: Ongoing  Goal: Control of acute pain  Description: Control of acute pain  8/4/2020 0437 by Bárbara Tipton RN  Outcome: Ongoing  8/3/2020 1607 by Bushra Servin RN  Outcome: Ongoing  Goal: Control of chronic pain  Description: Control of chronic pain  8/4/2020 0437 by Bárbara Tipton RN  Outcome: Ongoing  8/3/2020 1607 by Bushra Servin RN  Outcome: Ongoing     Problem: Skin Integrity:  Goal: Will show no infection signs and symptoms  Description: Will show no infection signs and symptoms  8/4/2020 0437 by Bárbara Tipton RN  Outcome: Ongoing  8/3/2020 1607 by Bushra Servin RN  Outcome: Ongoing  Goal: Absence of new skin breakdown  Description: Absence of new skin breakdown  8/4/2020 0437 by Bárbara Tipton RN  Outcome: Ongoing  8/3/2020 1607 by Bushra Servin RN  Outcome: Ongoing     Problem: Falls - Risk of:  Goal: Will remain free from falls  Description: Will remain free from falls  8/4/2020 0437 by Bárbara Tipton RN  Outcome: Ongoing  8/3/2020 1607 by Bushra Servin RN  Outcome: Ongoing  Goal: Absence of physical injury  Description: Absence of physical injury  8/4/2020 0437 by Bárbara Tipton RN  Outcome: Ongoing  8/3/2020 1607 by Bushra Servin RN  Outcome: Ongoing

## 2020-08-09 NOTE — DISCHARGE SUMMARY
[facility]     Department of Internal Medicine - Staff Internal Medicine Teaching Service    INPATIENT DISCHARGE SUMMARY      Patient Identification:  Theresa Meneses is a 78 y.o. female. :  1950  MRN: [id number]     Acct: [de-identified]   PCP: Ivis Chaudhary MD  Admit Date:  2020  Discharge date and time: 2020  6:44 PM   Attending Provider: No att. providers found                                     [facility] Problem Lists:  Principal Problem:    Acute mastoiditis of left side  Active Problems:    Essential hypertension    Obstructive sleep apnea syndrome    Morbid obesity due to excess calories ([address]    Type 2 diabetes mellitus with diabetic polyneuropathy, without long-term current use of insulin ([address]    Coronary artery disease involving native coronary artery of native heart with unstable angina pectoris (HCC)    Vertigo    Dizziness    Migraine without aura and without status migrainosus, not intractable  Resolved Problems:    * No resolved hospital problems. *      HOSPITAL STAY     Brief Inpatient course:   Theresa Meneses is a 78 y.o. female who was admitted for the management of Acute mastoiditis of left side, presented to the emergency department with dizziness and nausea for last 3 days  as recommended by Dr. Chris Corea her office phone visit. Patient states that she is not able to get out of her bed for the last 3 days. Brittny Streeter has a spinning sensation when she tries to get out of bed.  Patient describes dizziness as room spinning, she states that when she gets up she nearly falls over and has to hold onto the wall and then immediately needs to sit back down, however when she does stand she feels the room is spinning.  There is h/o nausea and tinnitus but no vomiting.  Patient feels weak. Patient states this has never happened before. Hipolito Croft denies any syncopal episode.   Patient tried over-the-counter motion sickness medication but did not get any relief. Patient has history of A. fib, on Eliquis.  History of hypertension(on amlodipine and Lopressor), diastolic CHF(on Lasix) with ejection fraction more than 65%(ECHO 2/19) . No history of recent viral URI symptoms, denies any head trauma, denied any previous history of vertigo.  Patient has history of meningioma and goes yearly MRI to monitor for any growth.  History of DM type II.  Previous MI on Eliquis, atrial fibrillation. MRI appears to be in 2017 showed a stable left frontal meningioma. Recent MRI done on July 31 2020 showing stable left frontal meningioma and  Left mastoid effusion.   Patient recently took antibiotics for right lower extremity Cellulitis. In ER chest Xray show Cardiomegaly. CT Head wo contrast: No acute intracranial findings. CTA Head Neck w contrast: No acute arterial abnormality. B sugar: 176, PTT 31.9. Amitriptyline and amlodipine were held. Orthostatics were negative.       Procedures/ Significant intervention-none      Consults:     Consults:     Final Specialist Recommendations/Findings:   IP CONSULT TO NEUROLOGY  IP CONSULT TO HOSPITALIST  IP CONSULT TO INTERNAL MEDICINE  IP CONSULT TO OTOLARYNGOLOGY  IP CONSULT TO IV TEAM      Any Hospital Acquired Infections: none    Discharge Functional Status:  stable    DISCHARGE PLAN     Disposition: SNF    Patient Instructions:   Discharge Medication List as of 8/4/2020  4:30 PM      CONTINUE these medications which have NOT CHANGED    Details   meclizine (ANTIVERT) 12.5 MG tablet Take 1 tablet by mouth 3 times daily as needed for Dizziness, Disp-30 tablet,R-0Normal      metFORMIN (GLUCOPHAGE) 1000 MG tablet TAKE 1 TABLET BY MOUTH TWICE DAILY WITH MEALS, Disp-180 tablet,R-3Normal      metoprolol tartrate (LOPRESSOR) 25 MG tablet TAKE 1 TABLET BY MOUTH TWICE DAILY, Disp-180 tablet,R-3Normal      amitriptyline (ELAVIL) 50 MG tablet TAKE 1 TABLET BY MOUTH EVERY NIGHT, Disp-90 tablet,R-3Normal furosemide (LASIX) 20 MG tablet TAKE 1 TABLET BY MOUTH DAILY, Disp-60 tablet, R-3Normal      pantoprazole (PROTONIX) 40 MG tablet TAKE 1 TABLET EVERY MORNING (BEFORE BREAKFAST), Disp-90 tablet,R-3Normal      amLODIPine (NORVASC) 10 MG tablet TAKE 1 TABLET BY MOUTH DAILY, Disp-30 tablet,R-5Normal      blood glucose monitor strips Test 3 times a day. Diag: DM II, Disp-100 strip, R-11, Normal      atorvastatin (LIPITOR) 80 MG tablet TAKE 1 TABLET BY MOUTH EVERY NIGHT, Disp-30 tablet, R-0Normal      ELIQUIS 5 MG TABS tablet Take 5 mg by mouth 2 times daily, R-3, DAWHistorical Med      oxybutynin (DITROPAN-XL) 10 MG extended release tablet Take 1 tablet by mouth daily, Disp-30 tablet, R-3Normal      nystatin (MYCOSTATIN) 958827 UNIT/GM powder Apply 3 times daily. , Disp-60 g, R-5, Normal      Lancets MISC DAILY Starting Thu 5/30/2019, Disp-100 each, R-11, NormalUse 3 times daily Diagnisis:250.0  Diabetes Mellitus Non-Insulin Dependent      nitroGLYCERIN (NITROSTAT) 0.4 MG SL tablet Place 1 tablet under the tongue every 5 minutes as needed for Chest pain up to max of 3 total doses.  If no relief after 1 dose, call 911., Disp-25 tablet, R-3Normal      magnesium hydroxide (MILK OF MAGNESIA) 400 MG/5ML suspension Take 5 mLs by mouth daily as needed for ConstipationHistorical Med             Activity: activity as tolerated    Diet: regular diet    Follow-up:    Katy Iqbal MD  [facility]  [address], [address]                                                                             [phone]    In 1 week  [address] Juan Millan MD  [address] [place] [address], [address] [facility]  [phone]      [address] [facility], [address] [facility] [address] Psychiatric [address] [phone]      [facility] hospital [id number]      Patient Instructions: vestibular rehab outpatient, follow up with ENT Neurology and PCP OP      Follow up labs:  BMP 3 days after discharge      Note that over 30 minutes was spent in preparing discharge papers, discussing discharge with patient, medication review, etc.      Alecia Hutton MD, MD  Internal Medicine Resident, PGY-1  [facility];  [place], [place]  8/9/2020, 1:05 PM

## 2020-08-12 NOTE — TELEPHONE ENCOUNTER
Request for oxybutynin - med pended. Please fill if appropriate. Next Visit Date:  Future Appointments   Date Time Provider Trang Love   4/8/2021 10:00 AM Ronnie Garcia MD Neuro Spec [address] Maintenance   Topic Date Due    Shingles Vaccine (1 of 2) 09/12/1999    Diabetic retinal exam  01/15/2017    Diabetic foot exam  04/18/2019    Annual Wellness Visit (AWV)  10/10/2019    Diabetic microalbuminuria test  03/20/2020    Colon Cancer Screen FIT/FOBT  03/28/2020    Breast cancer screen  04/17/2020    Flu vaccine (1) 09/01/2020    Lipid screen  02/20/2021    A1C test (Diabetic or Prediabetic)  07/30/2021    Potassium monitoring  08/04/2021    Creatinine monitoring  08/04/2021    DTaP/Tdap/Td vaccine (2 - Td) 09/03/2025    DEXA (modify frequency per FRAX score)  Completed    Pneumococcal 65+ years Vaccine  Completed    Hepatitis C screen  Completed    Hepatitis A vaccine  Aged Out    Hib vaccine  Aged Out    Meningococcal (ACWY) vaccine  Aged Out       Hemoglobin A1C (%)   Date Value   07/30/2020 7.7 (H)   10/10/2019 7.0   06/07/2018 6.8             ( goal A1C is < 7)   Microalb/Crt.  Ratio (mcg/mg creat)   Date Value   03/20/2019 CANNOT BE CALCULATED     LDL Cholesterol (mg/dL)   Date Value   02/20/2020 41       (goal LDL is <100)   AST (IU/L)   Date Value   11/14/2019 23     ALT (IU/L)   Date Value   11/14/2019 15     BUN (mg/dL)   Date Value   08/04/2020 26 (H)     BP Readings from Last 3 Encounters:   08/04/20 115/61   02/20/20 137/82   10/10/19 134/75          (goal 120/80)    All Future Testing planned in [facility]  Lab Frequency Next Occurrence   Hemoglobin A1C Once 08/21/2020         Patient Active Problem List:     Essential hypertension     Obstructive sleep apnea syndrome     Morbid obesity due to excess calories (HCC)     Arthritis of knee     Cardiac murmur     Type 2 diabetes mellitus with diabetic polyneuropathy, without long-term current use of insulin [facility])     Status post right knee replacement     Coronary artery disease involving native coronary artery of native heart with unstable angina pectoris (HCC)     Meningioma (HCC)     Chronic diastolic congestive heart failure (HCC)     Chest pain     First degree AV block     Chronic daily headache     Eczema     Unstable angina (HCC)     Chronic atrial fibrillation     Vertigo     Dizziness     Migraine without aura and without status migrainosus, not intractable     Acute mastoiditis of left side

## 2020-08-14 RX ORDER — OXYBUTYNIN CHLORIDE 10 MG/1
10 TABLET, EXTENDED RELEASE ORAL DAILY
Qty: 30 TABLET | Refills: 3 | Status: SHIPPED | OUTPATIENT
Start: 2020-08-14

## 2020-11-03 PROBLEM — R42 DIZZINESS: Status: RESOLVED | Noted: 2020-07-29 | Resolved: 2020-11-03

## 2021-05-19 ENCOUNTER — OFFICE VISIT (OUTPATIENT)
Dept: NEUROLOGY | Age: 71
End: 2021-05-19
Payer: MEDICARE

## 2021-05-19 VITALS
WEIGHT: 293 LBS | SYSTOLIC BLOOD PRESSURE: 133 MMHG | HEIGHT: 65 IN | BODY MASS INDEX: 48.82 KG/M2 | DIASTOLIC BLOOD PRESSURE: 84 MMHG | HEART RATE: 82 BPM

## 2021-05-19 DIAGNOSIS — G43.009 MIGRAINE WITHOUT AURA AND WITHOUT STATUS MIGRAINOSUS, NOT INTRACTABLE: ICD-10-CM

## 2021-05-19 DIAGNOSIS — G47.33 OBSTRUCTIVE SLEEP APNEA SYNDROME: Primary | ICD-10-CM

## 2021-05-19 PROCEDURE — G8417 CALC BMI ABV UP PARAM F/U: HCPCS | Performed by: PSYCHIATRY & NEUROLOGY

## 2021-05-19 PROCEDURE — G8399 PT W/DXA RESULTS DOCUMENT: HCPCS | Performed by: PSYCHIATRY & NEUROLOGY

## 2021-05-19 PROCEDURE — 3017F COLORECTAL CA SCREEN DOC REV: CPT | Performed by: PSYCHIATRY & NEUROLOGY

## 2021-05-19 PROCEDURE — 4040F PNEUMOC VAC/ADMIN/RCVD: CPT | Performed by: PSYCHIATRY & NEUROLOGY

## 2021-05-19 PROCEDURE — 99214 OFFICE O/P EST MOD 30 MIN: CPT | Performed by: PSYCHIATRY & NEUROLOGY

## 2021-05-19 PROCEDURE — 1036F TOBACCO NON-USER: CPT | Performed by: PSYCHIATRY & NEUROLOGY

## 2021-05-19 PROCEDURE — 1123F ACP DISCUSS/DSCN MKR DOCD: CPT | Performed by: PSYCHIATRY & NEUROLOGY

## 2021-05-19 PROCEDURE — G8427 DOCREV CUR MEDS BY ELIG CLIN: HCPCS | Performed by: PSYCHIATRY & NEUROLOGY

## 2021-05-19 PROCEDURE — 1090F PRES/ABSN URINE INCON ASSESS: CPT | Performed by: PSYCHIATRY & NEUROLOGY

## 2021-05-19 RX ORDER — OXYBUTYNIN CHLORIDE 5 MG/1
TABLET ORAL
COMMUNITY
Start: 2021-03-07 | End: 2021-05-19

## 2021-05-19 RX ORDER — AMITRIPTYLINE HYDROCHLORIDE 50 MG/1
TABLET, FILM COATED ORAL
Qty: 90 TABLET | Refills: 3 | Status: SHIPPED | OUTPATIENT
Start: 2021-05-19

## 2021-05-19 ASSESSMENT — ENCOUNTER SYMPTOMS
RESPIRATORY NEGATIVE: 1
EYES NEGATIVE: 1
GASTROINTESTINAL NEGATIVE: 1
ALLERGIC/IMMUNOLOGIC NEGATIVE: 1

## 2021-05-19 NOTE — PROGRESS NOTES
Subjective:      Patient ID: Kaden Mann is a 69 y.o. female. HPI  Active problem sleep apnea on nasal CPAP along with migraine headaches  . Syncope  . The condition she was admitted to [facility] in July 2020 for dIzziness felt to be benign positional vertigo  . She underwent vestibuar rehaibilation with no more dizziness recurrence  . She has not been using nasal CPAP unable to get supplies having mask leakage with current mask . Clearly when she uses nasal CPAP she feels energized otherwise being tired and sleepy . She is not having headaches over the past 4 months remaining on elavil . She denies focal motor ,sensory or bulbar complaint . There is obesity BMI 50. There has been no syncope sincee last seen . She is on eliquis for atrial fibrillation . Significant medications elavil 75 mg po qhs , eliquis 5 mg po bid . Testing MRI of Head with 7 mm left frontal meningioma , April 2017. Head CT with small left frontal extraxial mass concordant with meningioma . Carotid ultrasound 1-15 % stenosis bilaterally . Cardiac 2 D echo hyperdynamic LVF EF > 65 %. Polysomnogram apnea hypopnea index 53 episodes per hour with oxyhemoglobin desaturation to 64 % , September 2017 . Polysomnogram apnea hypopnea index 48 episodes per hour with oxyhemoglobin desaturation to 65 %, March 2018  . CPAP 14 cm H20 . CTA head and neck with no large vesselo clusion .  MRI of Head with small left frontal convexity 5 x 10 mm meningioma      Past Medical History:   Diagnosis Date    HOSSEIN (acute kidney injury) ([address] 3/3/2017    Arthritis     shoulders    Chronic diastolic congestive heart failure ([address] 3/3/2017    Chronic kidney disease     Coronary artery disease involving native coronary artery of native heart without angina pectoris 10/13/2016    Epistaxis since Jan. 17 2019    GERD (gastroesophageal reflux disease)     Headache     Hyperlipidemia     Hypertension     MI (myocardial infarction) ([address] 2015    Dr. Francisco Sanchez Dr. Food in the Last Year:    Transportation Needs:     Lack of Transportation (Medical):  Lack of Transportation (Non-Medical):    Physical Activity:     Days of Exercise per Week:     Minutes of Exercise per Session:    Stress:     Feeling of Stress :    Social Connections:     Frequency of Communication with Friends and Family:     Frequency of Social Gatherings with Friends and Family:     Attends Gnosticist Services:     Active Member of Clubs or Organizations:     Attends Club or Organization Meetings:     Marital Status:    Intimate Partner Violence:     Fear of Current or Ex-Partner:     Emotionally Abused:     Physically Abused:     Sexually Abused:        Current Outpatient Medications   Medication Sig Dispense Refill    amitriptyline (ELAVIL) 50 MG tablet TAKE 1 TABLET BY MOUTH EVERY NIGHT 90 tablet 3    oxybutynin (DITROPAN-XL) 10 MG extended release tablet TAKE 1 TABLET BY MOUTH DAILY 30 tablet 3    metFORMIN (GLUCOPHAGE) 1000 MG tablet TAKE 1 TABLET BY MOUTH TWICE DAILY WITH MEALS (Patient taking differently: Take 1,000 mg by mouth daily (with breakfast) TAKE 1 TABLET BY MOUTH EVERY OTHER DAY) 180 tablet 3    metoprolol tartrate (LOPRESSOR) 25 MG tablet TAKE 1 TABLET BY MOUTH TWICE DAILY 180 tablet 3    furosemide (LASIX) 20 MG tablet TAKE 1 TABLET BY MOUTH DAILY 60 tablet 3    amLODIPine (NORVASC) 10 MG tablet TAKE 1 TABLET BY MOUTH DAILY 30 tablet 5    blood glucose monitor strips Test 3 times a day. Diag: DM  strip 11    atorvastatin (LIPITOR) 80 MG tablet TAKE 1 TABLET BY MOUTH EVERY NIGHT 30 tablet 0    ELIQUIS 5 MG TABS tablet Take 5 mg by mouth 2 times daily  3    Lancets MISC 1 each by Does not apply route daily Use 3 times daily Diagnisis:250.0  Diabetes Mellitus Non-Insulin Dependent 100 each 11    nitroGLYCERIN (NITROSTAT) 0.4 MG SL tablet Place 1 tablet under the tongue every 5 minutes as needed for Chest pain up to max of 3 total doses.  If no relief after 1 dose, call 911. 25 tablet 3    magnesium hydroxide (MILK OF MAGNESIA) 400 MG/5ML suspension Take 5 mLs by mouth daily as needed for Constipation      pantoprazole (PROTONIX) 40 MG tablet TAKE 1 TABLET EVERY MORNING (BEFORE BREAKFAST) (Patient not taking: Reported on 5/19/2021) 90 tablet 3    nystatin (MYCOSTATIN) 281589 UNIT/GM powder Apply 3 times daily. (Patient not taking: Reported on 5/19/2021) 60 g 5     No current facility-administered medications for this visit. Allergies   Allergen Reactions    Lisinopril Itching     Inflammation, burning, itching    Pcn [Penicillins] Hives    Fish-Derived Products     Losartan Hives       Review of Systems   Constitutional: Negative. HENT: Negative. Eyes: Negative. Respiratory: Negative. Cardiovascular: Negative. Gastrointestinal: Negative. Endocrine: Negative. Genitourinary: Negative. Musculoskeletal: Negative. Skin: Negative. Allergic/Immunologic: Negative. Neurological: Negative. Hematological: Negative. Psychiatric/Behavioral: Negative. Objective:   Physical Exam  Vitals:    05/19/21 1029   BP: 133/84   Pulse: 82     weight: (!) 303 lb 6.4 oz (137.6 kg)    Neurological Examination  Constitutional .General exam well groomed   Ears /Nose/Throat: external ear . Normal exam  Neck and thyroid . Normal size  Cardiovascular: Auscultation of heart with regular rate and rhythm   Musculoskeletal. Muscle bulk and tone normal                                                           Muscle strength 5/5 strength throughout                                                                                 No dysmetria or dysdiadokinesis  No tremor   Normal fine motor  Orientation Alert and oriented x 3   Attention and concentration normal   Short term memory normal   Language process and speech normal . No aphasia   Cranial nerve 2 normal acuety and visual fields  Cranial nerve 3, 4 and 6 . Extraocular muscles are intact .  Pupils are equal and reactive   Cranial nerve 5 . Intact corneal reflex. Normal facial sensation  Cranial nerve 7 normal exam   Cranial nerve 8. Grossly intact hearing   Cranial nerve 9 and 10. Symmetric palate elevation   Cranial nerve 11 , 5 out of 5 strength   Cranial Nerve 12 midline tongue . No atrophy  Sensation .  pinprick and light touch in stocking distribution   Deep Tendon Reflexes absent ankle jerks   Plantar response flexor bilaterally     Assessment:      1. Obstructive sleep apnea syndrome    2.  Migraine without aura and without status migrainosus, not intractable    She is to get new nasal CPAP headgear to continue current regimen      Plan:      Orders Placed This Encounter   Procedures    DME Order for CPAP as OP     New mask , headgear , tubing and supplies

## 2021-07-26 LAB
CREATININE, URINE: 360
MICROALBUMIN/CREAT 24H UR: 3 MG/G{CREAT}
MICROALBUMIN/CREAT UR-RTO: 8.3

## 2021-07-27 LAB
AVERAGE GLUCOSE: NORMAL
HBA1C MFR BLD: 7.6 %

## 2021-09-10 ENCOUNTER — TELEPHONE (OUTPATIENT)
Dept: FAMILY MEDICINE CLINIC | Age: 71
End: 2021-09-10

## 2021-09-10 NOTE — TELEPHONE ENCOUNTER
Gagan Campos was contacted as part of care everywhere abstracting outreach. Spoke to patient and gathered relevant information to close gap in care. Location of testing was [facility].      Evette

## 2021-10-25 LAB
AVERAGE GLUCOSE: NORMAL
HBA1C MFR BLD: 8 %

## 2021-11-02 ENCOUNTER — HOSPITAL ENCOUNTER (EMERGENCY)
Age: 71
Discharge: HOME | End: 2021-11-02
Payer: MEDICARE

## 2021-11-02 VITALS
HEART RATE: 88 BPM | HEIGHT: 65 IN | OXYGEN SATURATION: 95 % | DIASTOLIC BLOOD PRESSURE: 84 MMHG | SYSTOLIC BLOOD PRESSURE: 137 MMHG | WEIGHT: 293 LBS | BODY MASS INDEX: 48.82 KG/M2 | TEMPERATURE: 97.7 F | RESPIRATION RATE: 20 BRPM

## 2021-11-02 PROCEDURE — 93005 ELECTROCARDIOGRAM TRACING: CPT | Performed by: STUDENT IN AN ORGANIZED HEALTH CARE EDUCATION/TRAINING PROGRAM

## 2021-11-02 ASSESSMENT — PAIN SCALES - GENERAL: PAINLEVEL_OUTOF10: 5

## 2021-11-03 LAB
EKG ATRIAL RATE: 85 BPM
EKG P AXIS: 49 DEGREES
EKG P-R INTERVAL: 180 MS
EKG Q-T INTERVAL: 392 MS
EKG QRS DURATION: 94 MS
EKG QTC CALCULATION (BAZETT): 466 MS
EKG R AXIS: -7 DEGREES
EKG T AXIS: 39 DEGREES
EKG VENTRICULAR RATE: 85 BPM

## 2021-11-03 PROCEDURE — 93010 ELECTROCARDIOGRAM REPORT: CPT | Performed by: INTERNAL MEDICINE

## 2022-08-01 RX ORDER — AMITRIPTYLINE HYDROCHLORIDE 50 MG/1
TABLET, FILM COATED ORAL
Qty: 90 TABLET | Refills: 3 | OUTPATIENT
Start: 2022-08-01

## (undated) DEVICE — SYRINGE, LUER LOCK, 10ML: Brand: MEDLINE

## (undated) DEVICE — SOLUTION SURG PREP POV IOD 7.5% 4 OZ

## (undated) DEVICE — TONSIL SPONGES ST

## (undated) DEVICE — SVMMC NSL PK

## (undated) DEVICE — GARMENT,MEDLINE,DVT,INT,CALF,MED, GEN2: Brand: MEDLINE

## (undated) DEVICE — PREP SOL PVP IODINE 4%  4 OZ/BTL

## (undated) DEVICE — POSITIONER HD W8XH4XL8.5IN RASPBERRY FOAM SLT

## (undated) DEVICE — DRAPE,REIN 53X77,STERILE: Brand: MEDLINE

## (undated) DEVICE — FIRM 8CM: Brand: NASOPORE

## (undated) DEVICE — GLOVE SURG SZ 65 THK91MIL LTX FREE SYN POLYISOPRENE

## (undated) DEVICE — NEEDLE SPNL 18GA L3.5IN W/ QNCKE SHARPER BVL DURA CLICK

## (undated) DEVICE — SUCTION COAGULATOR, HANDSWITCHING, 10 FR, 6 INCH (15.24CM): Brand: MEGADYNE

## (undated) DEVICE — Device

## (undated) DEVICE — GLOVE ORANGE PI 7 1/2   MSG9075

## (undated) DEVICE — GLOVE ORANGE PI 7   MSG9070